# Patient Record
Sex: MALE
[De-identification: names, ages, dates, MRNs, and addresses within clinical notes are randomized per-mention and may not be internally consistent; named-entity substitution may affect disease eponyms.]

---

## 2020-03-05 ENCOUNTER — SURGERY (OUTPATIENT)
Age: 50
End: 2020-03-05
Payer: COMMERCIAL

## 2020-03-05 ENCOUNTER — APPOINTMENT (OUTPATIENT)
Dept: GENERAL RADIOLOGY | Facility: CLINIC | Age: 50
End: 2020-03-05
Attending: EMERGENCY MEDICINE
Payer: COMMERCIAL

## 2020-03-05 ENCOUNTER — HOSPITAL ENCOUNTER (INPATIENT)
Facility: CLINIC | Age: 50
LOS: 3 days | Discharge: HOME | End: 2020-03-08
Attending: EMERGENCY MEDICINE | Admitting: INTERNAL MEDICINE
Payer: COMMERCIAL

## 2020-03-05 DIAGNOSIS — R57.0 CARDIOGENIC SHOCK (H): ICD-10-CM

## 2020-03-05 DIAGNOSIS — I21.19 ACUTE ST ELEVATION MYOCARDIAL INFARCTION (STEMI) OF INFERIOR WALL (H): ICD-10-CM

## 2020-03-05 DIAGNOSIS — I44.2 COMPLETE HEART BLOCK (H): ICD-10-CM

## 2020-03-05 DIAGNOSIS — I21.3 ST ELEVATION MYOCARDIAL INFARCTION (STEMI), UNSPECIFIED ARTERY (H): ICD-10-CM

## 2020-03-05 DIAGNOSIS — I25.10 CORONARY ARTERY DISEASE INVOLVING NATIVE CORONARY ARTERY OF NATIVE HEART WITHOUT ANGINA PECTORIS: Primary | ICD-10-CM

## 2020-03-05 DIAGNOSIS — I21.3 ST ELEVATION MI (STEMI) (H): ICD-10-CM

## 2020-03-05 LAB
ANION GAP SERPL CALCULATED.3IONS-SCNC: 9 MMOL/L (ref 3–14)
APTT PPP: 28 SEC (ref 22–37)
APTT PPP: >240 SEC (ref 22–37)
BUN SERPL-MCNC: 20 MG/DL (ref 7–30)
CALCIUM SERPL-MCNC: 8.2 MG/DL (ref 8.5–10.1)
CHLORIDE SERPL-SCNC: 112 MMOL/L (ref 94–109)
CHOLEST SERPL-MCNC: 190 MG/DL
CO2 SERPL-SCNC: 20 MMOL/L (ref 20–32)
CREAT SERPL-MCNC: 1.07 MG/DL (ref 0.66–1.25)
GFR SERPL CREATININE-BSD FRML MDRD: 82 ML/MIN/{1.73_M2}
GLUCOSE BLDC GLUCOMTR-MCNC: 104 MG/DL (ref 70–99)
GLUCOSE BLDC GLUCOMTR-MCNC: 113 MG/DL (ref 70–99)
GLUCOSE SERPL-MCNC: 152 MG/DL (ref 70–99)
HBA1C MFR BLD: 5.7 % (ref 0–5.6)
HDLC SERPL-MCNC: 35 MG/DL
INR PPP: 1.08 (ref 0.86–1.14)
INTERPRETATION ECG - MUSE: NORMAL
KCT BLD-ACNC: 235 SEC (ref 75–150)
LDLC SERPL CALC-MCNC: 146 MG/DL
NONHDLC SERPL-MCNC: 155 MG/DL
POTASSIUM SERPL-SCNC: 3.5 MMOL/L (ref 3.4–5.3)
SODIUM SERPL-SCNC: 141 MMOL/L (ref 133–144)
TRIGL SERPL-MCNC: 43 MG/DL
TROPONIN I SERPL-MCNC: 10.83 UG/L (ref 0–0.04)
TROPONIN I SERPL-MCNC: <0.015 UG/L (ref 0–0.04)

## 2020-03-05 PROCEDURE — 25000132 ZZH RX MED GY IP 250 OP 250 PS 637: Performed by: INTERNAL MEDICINE

## 2020-03-05 PROCEDURE — C1730 CATH, EP, 19 OR FEW ELECT: HCPCS | Performed by: INTERNAL MEDICINE

## 2020-03-05 PROCEDURE — 80061 LIPID PANEL: CPT | Performed by: INTERNAL MEDICINE

## 2020-03-05 PROCEDURE — 84484 ASSAY OF TROPONIN QUANT: CPT | Performed by: INTERNAL MEDICINE

## 2020-03-05 PROCEDURE — 99152 MOD SED SAME PHYS/QHP 5/>YRS: CPT | Performed by: INTERNAL MEDICINE

## 2020-03-05 PROCEDURE — 92920 PRQ TRLUML C ANGIOP 1ART&/BR: CPT | Mod: 59 | Performed by: INTERNAL MEDICINE

## 2020-03-05 PROCEDURE — 93458 L HRT ARTERY/VENTRICLE ANGIO: CPT | Mod: 26 | Performed by: INTERNAL MEDICINE

## 2020-03-05 PROCEDURE — 25000128 H RX IP 250 OP 636: Performed by: INTERNAL MEDICINE

## 2020-03-05 PROCEDURE — 25800030 ZZH RX IP 258 OP 636: Performed by: INTERNAL MEDICINE

## 2020-03-05 PROCEDURE — 20000003 ZZH R&B ICU

## 2020-03-05 PROCEDURE — 80048 BASIC METABOLIC PNL TOTAL CA: CPT | Performed by: EMERGENCY MEDICINE

## 2020-03-05 PROCEDURE — 85025 COMPLETE CBC W/AUTO DIFF WBC: CPT | Performed by: EMERGENCY MEDICINE

## 2020-03-05 PROCEDURE — C1887 CATHETER, GUIDING: HCPCS | Performed by: INTERNAL MEDICINE

## 2020-03-05 PROCEDURE — B2111ZZ FLUOROSCOPY OF MULTIPLE CORONARY ARTERIES USING LOW OSMOLAR CONTRAST: ICD-10-PCS | Performed by: INTERNAL MEDICINE

## 2020-03-05 PROCEDURE — 27210794 ZZH OR GENERAL SUPPLY STERILE: Performed by: INTERNAL MEDICINE

## 2020-03-05 PROCEDURE — C1894 INTRO/SHEATH, NON-LASER: HCPCS | Performed by: INTERNAL MEDICINE

## 2020-03-05 PROCEDURE — 92941 PRQ TRLML REVSC TOT OCCL AMI: CPT | Mod: RC | Performed by: INTERNAL MEDICINE

## 2020-03-05 PROCEDURE — C1725 CATH, TRANSLUMIN NON-LASER: HCPCS | Performed by: INTERNAL MEDICINE

## 2020-03-05 PROCEDURE — 99223 1ST HOSP IP/OBS HIGH 75: CPT | Mod: AI | Performed by: INTERNAL MEDICINE

## 2020-03-05 PROCEDURE — C1769 GUIDE WIRE: HCPCS | Performed by: INTERNAL MEDICINE

## 2020-03-05 PROCEDURE — 85730 THROMBOPLASTIN TIME PARTIAL: CPT | Performed by: INTERNAL MEDICINE

## 2020-03-05 PROCEDURE — 85730 THROMBOPLASTIN TIME PARTIAL: CPT | Performed by: EMERGENCY MEDICINE

## 2020-03-05 PROCEDURE — 99291 CRITICAL CARE FIRST HOUR: CPT | Performed by: INTERNAL MEDICINE

## 2020-03-05 PROCEDURE — 83036 HEMOGLOBIN GLYCOSYLATED A1C: CPT | Performed by: INTERNAL MEDICINE

## 2020-03-05 PROCEDURE — 3E033XZ INTRODUCTION OF VASOPRESSOR INTO PERIPHERAL VEIN, PERCUTANEOUS APPROACH: ICD-10-PCS | Performed by: INTERNAL MEDICINE

## 2020-03-05 PROCEDURE — 92978 ENDOLUMINL IVUS OCT C 1ST: CPT | Performed by: INTERNAL MEDICINE

## 2020-03-05 PROCEDURE — 93454 CORONARY ARTERY ANGIO S&I: CPT | Mod: XE

## 2020-03-05 PROCEDURE — 99291 CRITICAL CARE FIRST HOUR: CPT | Mod: 25 | Performed by: INTERNAL MEDICINE

## 2020-03-05 PROCEDURE — 99285 EMERGENCY DEPT VISIT HI MDM: CPT

## 2020-03-05 PROCEDURE — C1753 CATH, INTRAVAS ULTRASOUND: HCPCS | Performed by: INTERNAL MEDICINE

## 2020-03-05 PROCEDURE — 027036Z DILATION OF CORONARY ARTERY, ONE ARTERY WITH THREE DRUG-ELUTING INTRALUMINAL DEVICES, PERCUTANEOUS APPROACH: ICD-10-PCS | Performed by: INTERNAL MEDICINE

## 2020-03-05 PROCEDURE — C9600 PERC DRUG-EL COR STENT SING: HCPCS | Performed by: INTERNAL MEDICINE

## 2020-03-05 PROCEDURE — 92921 ZZHC PRQ TRLUML CORONARY ANGIOPLASTY ADDL BRANCH: CPT | Mod: RC | Performed by: INTERNAL MEDICINE

## 2020-03-05 PROCEDURE — 85347 COAGULATION TIME ACTIVATED: CPT

## 2020-03-05 PROCEDURE — 4A023N7 MEASUREMENT OF CARDIAC SAMPLING AND PRESSURE, LEFT HEART, PERCUTANEOUS APPROACH: ICD-10-PCS | Performed by: INTERNAL MEDICINE

## 2020-03-05 PROCEDURE — 93458 L HRT ARTERY/VENTRICLE ANGIO: CPT | Performed by: INTERNAL MEDICINE

## 2020-03-05 PROCEDURE — 33210 INSERT ELECTRD/PM CATH SNGL: CPT | Performed by: INTERNAL MEDICINE

## 2020-03-05 PROCEDURE — 93010 ELECTROCARDIOGRAM REPORT: CPT | Performed by: INTERNAL MEDICINE

## 2020-03-05 PROCEDURE — 02703ZZ DILATION OF CORONARY ARTERY, ONE ARTERY, PERCUTANEOUS APPROACH: ICD-10-PCS | Performed by: INTERNAL MEDICINE

## 2020-03-05 PROCEDURE — 85610 PROTHROMBIN TIME: CPT | Performed by: EMERGENCY MEDICINE

## 2020-03-05 PROCEDURE — 93454 CORONARY ARTERY ANGIO S&I: CPT | Mod: 26 | Performed by: INTERNAL MEDICINE

## 2020-03-05 PROCEDURE — 93005 ELECTROCARDIOGRAM TRACING: CPT

## 2020-03-05 PROCEDURE — 00000146 ZZHCL STATISTIC GLUCOSE BY METER IP

## 2020-03-05 PROCEDURE — 71045 X-RAY EXAM CHEST 1 VIEW: CPT

## 2020-03-05 PROCEDURE — B240ZZ3 ULTRASONOGRAPHY OF SINGLE CORONARY ARTERY, INTRAVASCULAR: ICD-10-PCS | Performed by: INTERNAL MEDICINE

## 2020-03-05 PROCEDURE — 5A1223Z PERFORMANCE OF CARDIAC PACING, CONTINUOUS: ICD-10-PCS | Performed by: INTERNAL MEDICINE

## 2020-03-05 PROCEDURE — C9606 PERC D-E COR REVASC W AMI S: HCPCS | Performed by: INTERNAL MEDICINE

## 2020-03-05 PROCEDURE — C1874 STENT, COATED/COV W/DEL SYS: HCPCS | Performed by: INTERNAL MEDICINE

## 2020-03-05 PROCEDURE — 84484 ASSAY OF TROPONIN QUANT: CPT | Performed by: EMERGENCY MEDICINE

## 2020-03-05 PROCEDURE — 92978 ENDOLUMINL IVUS OCT C 1ST: CPT | Mod: 26 | Performed by: INTERNAL MEDICINE

## 2020-03-05 DEVICE — STENT SYNERGY DRUG ELUTING 2.50X38MM  H7493926038250: Type: IMPLANTABLE DEVICE | Status: FUNCTIONAL

## 2020-03-05 DEVICE — STENT SYNERGY DRUG ELUTING 3.00X12MM  H7493926012300: Type: IMPLANTABLE DEVICE | Status: FUNCTIONAL

## 2020-03-05 RX ORDER — SODIUM CHLORIDE 9 MG/ML
INJECTION, SOLUTION INTRAVENOUS CONTINUOUS
Status: ACTIVE | OUTPATIENT
Start: 2020-03-05 | End: 2020-03-05

## 2020-03-05 RX ORDER — FENTANYL CITRATE 50 UG/ML
INJECTION, SOLUTION INTRAMUSCULAR; INTRAVENOUS
Status: DISCONTINUED | OUTPATIENT
Start: 2020-03-05 | End: 2020-03-05 | Stop reason: HOSPADM

## 2020-03-05 RX ORDER — SODIUM CHLORIDE 9 MG/ML
INJECTION, SOLUTION INTRAVENOUS CONTINUOUS
Status: DISCONTINUED | OUTPATIENT
Start: 2020-03-05 | End: 2020-03-05

## 2020-03-05 RX ORDER — DOPAMINE HYDROCHLORIDE 160 MG/100ML
2-20 INJECTION, SOLUTION INTRAVENOUS CONTINUOUS PRN
Status: DISCONTINUED | OUTPATIENT
Start: 2020-03-05 | End: 2020-03-05 | Stop reason: HOSPADM

## 2020-03-05 RX ORDER — NICOTINE POLACRILEX 4 MG
15-30 LOZENGE BUCCAL
Status: DISCONTINUED | OUTPATIENT
Start: 2020-03-05 | End: 2020-03-08 | Stop reason: HOSPADM

## 2020-03-05 RX ORDER — ATROPINE SULFATE 0.1 MG/ML
0.5 INJECTION INTRAVENOUS EVERY 5 MIN PRN
Status: DISCONTINUED | OUTPATIENT
Start: 2020-03-05 | End: 2020-03-05

## 2020-03-05 RX ORDER — EPTIFIBATIDE 2 MG/ML
2 INJECTION, SOLUTION INTRAVENOUS CONTINUOUS
Status: DISCONTINUED | OUTPATIENT
Start: 2020-03-05 | End: 2020-03-06

## 2020-03-05 RX ORDER — POTASSIUM CHLORIDE 1500 MG/1
20 TABLET, EXTENDED RELEASE ORAL
Status: COMPLETED | OUTPATIENT
Start: 2020-03-05 | End: 2020-03-06

## 2020-03-05 RX ORDER — ASPIRIN 81 MG/1
TABLET, CHEWABLE ORAL
Status: DISCONTINUED | OUTPATIENT
Start: 2020-03-05 | End: 2020-03-05 | Stop reason: HOSPADM

## 2020-03-05 RX ORDER — NALOXONE HYDROCHLORIDE 0.4 MG/ML
.2-.4 INJECTION, SOLUTION INTRAMUSCULAR; INTRAVENOUS; SUBCUTANEOUS
Status: ACTIVE | OUTPATIENT
Start: 2020-03-05 | End: 2020-03-06

## 2020-03-05 RX ORDER — FENTANYL CITRATE 50 UG/ML
25-50 INJECTION, SOLUTION INTRAMUSCULAR; INTRAVENOUS
Status: ACTIVE | OUTPATIENT
Start: 2020-03-05 | End: 2020-03-06

## 2020-03-05 RX ORDER — DEXTROSE MONOHYDRATE 25 G/50ML
25-50 INJECTION, SOLUTION INTRAVENOUS
Status: DISCONTINUED | OUTPATIENT
Start: 2020-03-05 | End: 2020-03-08 | Stop reason: HOSPADM

## 2020-03-05 RX ORDER — ASPIRIN 81 MG/1
81 TABLET ORAL DAILY
Status: DISCONTINUED | OUTPATIENT
Start: 2020-03-05 | End: 2020-03-05

## 2020-03-05 RX ORDER — FENTANYL CITRATE 50 UG/ML
25-50 INJECTION, SOLUTION INTRAMUSCULAR; INTRAVENOUS
Status: DISCONTINUED | OUTPATIENT
Start: 2020-03-05 | End: 2020-03-05

## 2020-03-05 RX ORDER — FLUMAZENIL 0.1 MG/ML
0.2 INJECTION, SOLUTION INTRAVENOUS
Status: ACTIVE | OUTPATIENT
Start: 2020-03-05 | End: 2020-03-06

## 2020-03-05 RX ORDER — NICOTINE 21 MG/24HR
1 PATCH, TRANSDERMAL 24 HOURS TRANSDERMAL DAILY
Status: DISCONTINUED | OUTPATIENT
Start: 2020-03-05 | End: 2020-03-08 | Stop reason: HOSPADM

## 2020-03-05 RX ORDER — HEPARIN SODIUM 10000 [USP'U]/100ML
100-1000 INJECTION, SOLUTION INTRAVENOUS CONTINUOUS PRN
Status: DISCONTINUED | OUTPATIENT
Start: 2020-03-05 | End: 2020-03-05 | Stop reason: HOSPADM

## 2020-03-05 RX ORDER — ACETAMINOPHEN 325 MG/1
650 TABLET ORAL EVERY 4 HOURS PRN
Status: DISCONTINUED | OUTPATIENT
Start: 2020-03-05 | End: 2020-03-08 | Stop reason: HOSPADM

## 2020-03-05 RX ORDER — ASPIRIN 81 MG/1
81 TABLET ORAL DAILY
Status: DISCONTINUED | OUTPATIENT
Start: 2020-03-06 | End: 2020-03-08 | Stop reason: HOSPADM

## 2020-03-05 RX ORDER — PHENYLEPHRINE HCL IN 0.9% NACL 1 MG/10 ML
SYRINGE (ML) INTRAVENOUS
Status: DISCONTINUED | OUTPATIENT
Start: 2020-03-05 | End: 2020-03-05 | Stop reason: HOSPADM

## 2020-03-05 RX ORDER — ALUMINA, MAGNESIA, AND SIMETHICONE 2400; 2400; 240 MG/30ML; MG/30ML; MG/30ML
15 SUSPENSION ORAL
Status: COMPLETED | OUTPATIENT
Start: 2020-03-05 | End: 2020-03-05

## 2020-03-05 RX ORDER — ACETAMINOPHEN 325 MG/1
650 TABLET ORAL EVERY 4 HOURS PRN
Status: DISCONTINUED | OUTPATIENT
Start: 2020-03-05 | End: 2020-03-05

## 2020-03-05 RX ORDER — POTASSIUM CHLORIDE 7.45 MG/ML
10 INJECTION INTRAVENOUS
Status: DISCONTINUED | OUTPATIENT
Start: 2020-03-05 | End: 2020-03-08 | Stop reason: HOSPADM

## 2020-03-05 RX ORDER — DOBUTAMINE HYDROCHLORIDE 200 MG/100ML
2-20 INJECTION INTRAVENOUS CONTINUOUS PRN
Status: DISCONTINUED | OUTPATIENT
Start: 2020-03-05 | End: 2020-03-05 | Stop reason: HOSPADM

## 2020-03-05 RX ORDER — ATROPINE SULFATE 0.1 MG/ML
INJECTION INTRAVENOUS
Status: DISCONTINUED
Start: 2020-03-05 | End: 2020-03-05 | Stop reason: HOSPADM

## 2020-03-05 RX ORDER — LIDOCAINE 40 MG/G
CREAM TOPICAL
Status: DISCONTINUED | OUTPATIENT
Start: 2020-03-05 | End: 2020-03-08 | Stop reason: HOSPADM

## 2020-03-05 RX ORDER — ONDANSETRON 2 MG/ML
INJECTION INTRAMUSCULAR; INTRAVENOUS
Status: DISCONTINUED | OUTPATIENT
Start: 2020-03-05 | End: 2020-03-05 | Stop reason: HOSPADM

## 2020-03-05 RX ORDER — IOPAMIDOL 755 MG/ML
INJECTION, SOLUTION INTRAVASCULAR
Status: DISCONTINUED | OUTPATIENT
Start: 2020-03-05 | End: 2020-03-05 | Stop reason: HOSPADM

## 2020-03-05 RX ORDER — POTASSIUM CHLORIDE 1.5 G/1.58G
20-40 POWDER, FOR SOLUTION ORAL
Status: DISCONTINUED | OUTPATIENT
Start: 2020-03-05 | End: 2020-03-08 | Stop reason: HOSPADM

## 2020-03-05 RX ORDER — MAGNESIUM SULFATE HEPTAHYDRATE 40 MG/ML
4 INJECTION, SOLUTION INTRAVENOUS EVERY 4 HOURS PRN
Status: DISCONTINUED | OUTPATIENT
Start: 2020-03-05 | End: 2020-03-08 | Stop reason: HOSPADM

## 2020-03-05 RX ORDER — NALOXONE HYDROCHLORIDE 0.4 MG/ML
.1-.4 INJECTION, SOLUTION INTRAMUSCULAR; INTRAVENOUS; SUBCUTANEOUS
Status: DISCONTINUED | OUTPATIENT
Start: 2020-03-05 | End: 2020-03-05

## 2020-03-05 RX ORDER — HEPARIN SODIUM 5000 [USP'U]/.5ML
5000 INJECTION, SOLUTION INTRAVENOUS; SUBCUTANEOUS EVERY 8 HOURS
Status: DISCONTINUED | OUTPATIENT
Start: 2020-03-06 | End: 2020-03-08 | Stop reason: HOSPADM

## 2020-03-05 RX ORDER — FLUMAZENIL 0.1 MG/ML
0.2 INJECTION, SOLUTION INTRAVENOUS
Status: DISCONTINUED | OUTPATIENT
Start: 2020-03-05 | End: 2020-03-05

## 2020-03-05 RX ORDER — POTASSIUM CL/LIDO/0.9 % NACL 10MEQ/0.1L
10 INTRAVENOUS SOLUTION, PIGGYBACK (ML) INTRAVENOUS
Status: DISCONTINUED | OUTPATIENT
Start: 2020-03-05 | End: 2020-03-08 | Stop reason: HOSPADM

## 2020-03-05 RX ORDER — HEPARIN SODIUM 5000 [USP'U]/.5ML
5000 INJECTION, SOLUTION INTRAVENOUS; SUBCUTANEOUS EVERY 8 HOURS
Status: DISCONTINUED | OUTPATIENT
Start: 2020-03-05 | End: 2020-03-05

## 2020-03-05 RX ORDER — HEPARIN SODIUM 1000 [USP'U]/ML
INJECTION, SOLUTION INTRAVENOUS; SUBCUTANEOUS
Status: DISCONTINUED | OUTPATIENT
Start: 2020-03-05 | End: 2020-03-05 | Stop reason: HOSPADM

## 2020-03-05 RX ORDER — NITROGLYCERIN 20 MG/100ML
.07-2 INJECTION INTRAVENOUS CONTINUOUS PRN
Status: DISCONTINUED | OUTPATIENT
Start: 2020-03-05 | End: 2020-03-05 | Stop reason: HOSPADM

## 2020-03-05 RX ORDER — ARGATROBAN 1 MG/ML
150 INJECTION, SOLUTION INTRAVENOUS
Status: DISCONTINUED | OUTPATIENT
Start: 2020-03-05 | End: 2020-03-05 | Stop reason: HOSPADM

## 2020-03-05 RX ORDER — ATROPINE SULFATE 0.1 MG/ML
0.5 INJECTION INTRAVENOUS EVERY 5 MIN PRN
Status: DISCONTINUED | OUTPATIENT
Start: 2020-03-05 | End: 2020-03-06

## 2020-03-05 RX ORDER — NITROGLYCERIN 0.4 MG/1
0.4 TABLET SUBLINGUAL EVERY 5 MIN PRN
Status: DISCONTINUED | OUTPATIENT
Start: 2020-03-05 | End: 2020-03-05

## 2020-03-05 RX ORDER — POTASSIUM CHLORIDE 29.8 MG/ML
20 INJECTION INTRAVENOUS
Status: DISCONTINUED | OUTPATIENT
Start: 2020-03-05 | End: 2020-03-08 | Stop reason: HOSPADM

## 2020-03-05 RX ORDER — SODIUM CHLORIDE, SODIUM LACTATE, POTASSIUM CHLORIDE, CALCIUM CHLORIDE 600; 310; 30; 20 MG/100ML; MG/100ML; MG/100ML; MG/100ML
1000 INJECTION, SOLUTION INTRAVENOUS CONTINUOUS
Status: DISCONTINUED | OUTPATIENT
Start: 2020-03-05 | End: 2020-03-05

## 2020-03-05 RX ORDER — ARGATROBAN 1 MG/ML
350 INJECTION, SOLUTION INTRAVENOUS
Status: DISCONTINUED | OUTPATIENT
Start: 2020-03-05 | End: 2020-03-05 | Stop reason: HOSPADM

## 2020-03-05 RX ORDER — HYDROMORPHONE HYDROCHLORIDE 1 MG/ML
0.2 INJECTION, SOLUTION INTRAMUSCULAR; INTRAVENOUS; SUBCUTANEOUS
Status: DISCONTINUED | OUTPATIENT
Start: 2020-03-05 | End: 2020-03-08 | Stop reason: HOSPADM

## 2020-03-05 RX ORDER — NALOXONE HYDROCHLORIDE 0.4 MG/ML
.1-.4 INJECTION, SOLUTION INTRAMUSCULAR; INTRAVENOUS; SUBCUTANEOUS
Status: DISCONTINUED | OUTPATIENT
Start: 2020-03-05 | End: 2020-03-08 | Stop reason: HOSPADM

## 2020-03-05 RX ORDER — EPTIFIBATIDE 2 MG/ML
180 INJECTION, SOLUTION INTRAVENOUS ONCE
Status: COMPLETED | OUTPATIENT
Start: 2020-03-05 | End: 2020-03-05

## 2020-03-05 RX ORDER — ATORVASTATIN CALCIUM 10 MG/1
40 TABLET, FILM COATED ORAL DAILY
Status: DISCONTINUED | OUTPATIENT
Start: 2020-03-05 | End: 2020-03-05

## 2020-03-05 RX ORDER — POTASSIUM CHLORIDE 1500 MG/1
20-40 TABLET, EXTENDED RELEASE ORAL
Status: DISCONTINUED | OUTPATIENT
Start: 2020-03-05 | End: 2020-03-08 | Stop reason: HOSPADM

## 2020-03-05 RX ORDER — LORAZEPAM 2 MG/ML
0.5 INJECTION INTRAMUSCULAR
Status: DISCONTINUED | OUTPATIENT
Start: 2020-03-05 | End: 2020-03-08 | Stop reason: HOSPADM

## 2020-03-05 RX ORDER — NALOXONE HYDROCHLORIDE 0.4 MG/ML
.2-.4 INJECTION, SOLUTION INTRAMUSCULAR; INTRAVENOUS; SUBCUTANEOUS
Status: DISCONTINUED | OUTPATIENT
Start: 2020-03-05 | End: 2020-03-05

## 2020-03-05 RX ORDER — ATORVASTATIN CALCIUM 40 MG/1
40 TABLET, FILM COATED ORAL DAILY
Status: DISCONTINUED | OUTPATIENT
Start: 2020-03-05 | End: 2020-03-08 | Stop reason: HOSPADM

## 2020-03-05 RX ORDER — EPTIFIBATIDE 2 MG/ML
2 INJECTION, SOLUTION INTRAVENOUS CONTINUOUS
Status: DISCONTINUED | OUTPATIENT
Start: 2020-03-05 | End: 2020-03-05

## 2020-03-05 RX ORDER — MAGNESIUM SULFATE HEPTAHYDRATE 40 MG/ML
2 INJECTION, SOLUTION INTRAVENOUS DAILY PRN
Status: DISCONTINUED | OUTPATIENT
Start: 2020-03-05 | End: 2020-03-08 | Stop reason: HOSPADM

## 2020-03-05 RX ORDER — LORAZEPAM 0.5 MG/1
0.5 TABLET ORAL
Status: DISCONTINUED | OUTPATIENT
Start: 2020-03-05 | End: 2020-03-08 | Stop reason: HOSPADM

## 2020-03-05 RX ORDER — NITROGLYCERIN 0.4 MG/1
0.4 TABLET SUBLINGUAL EVERY 5 MIN PRN
Status: DISCONTINUED | OUTPATIENT
Start: 2020-03-05 | End: 2020-03-08 | Stop reason: HOSPADM

## 2020-03-05 RX ADMIN — TICAGRELOR 90 MG: 90 TABLET ORAL at 16:54

## 2020-03-05 RX ADMIN — NICOTINE 1 PATCH: 21 PATCH, EXTENDED RELEASE TRANSDERMAL at 20:34

## 2020-03-05 RX ADMIN — SODIUM CHLORIDE: 9 INJECTION, SOLUTION INTRAVENOUS at 18:08

## 2020-03-05 RX ADMIN — MIDAZOLAM HYDROCHLORIDE 2 MG: 1 INJECTION, SOLUTION INTRAMUSCULAR; INTRAVENOUS at 23:08

## 2020-03-05 RX ADMIN — DOPAMINE HYDROCHLORIDE 5 MCG/KG/MIN: 160 INJECTION, SOLUTION INTRAVENOUS at 14:57

## 2020-03-05 RX ADMIN — EPTIFIBATIDE 13860 MCG: 2 INJECTION, SOLUTION INTRAVENOUS at 16:17

## 2020-03-05 RX ADMIN — ALUMINUM HYDROXIDE, MAGNESIUM HYDROXIDE, AND DIMETHICONE 15 ML: 400; 400; 40 SUSPENSION ORAL at 23:08

## 2020-03-05 RX ADMIN — IOPAMIDOL 100 ML: 755 INJECTION, SOLUTION INTRAVENOUS at 16:53

## 2020-03-05 RX ADMIN — ONDANSETRON 4 MG: 2 INJECTION INTRAMUSCULAR; INTRAVENOUS at 16:22

## 2020-03-05 RX ADMIN — EPTIFIBATIDE 2 MCG/KG/MIN: 200 INJECTION INTRAVENOUS at 18:10

## 2020-03-05 RX ADMIN — DOPAMINE HYDROCHLORIDE 5 MCG/KG/MIN: 160 INJECTION, SOLUTION INTRAVENOUS at 14:44

## 2020-03-05 RX ADMIN — DOPAMINE HYDROCHLORIDE 10 MCG/KG/MIN: 160 INJECTION, SOLUTION INTRAVENOUS at 14:46

## 2020-03-05 RX ADMIN — HEPARIN SODIUM 6000 UNITS: 1000 INJECTION INTRAVENOUS; SUBCUTANEOUS at 16:14

## 2020-03-05 RX ADMIN — DOPAMINE HYDROCHLORIDE 2 MCG/KG/MIN: 160 INJECTION, SOLUTION INTRAVENOUS at 15:06

## 2020-03-05 RX ADMIN — EPTIFIBATIDE 2 MCG/KG/MIN: 2 INJECTION, SOLUTION INTRAVENOUS at 16:23

## 2020-03-05 RX ADMIN — ATORVASTATIN CALCIUM 40 MG: 40 TABLET, FILM COATED ORAL at 20:33

## 2020-03-05 ASSESSMENT — ACTIVITIES OF DAILY LIVING (ADL): ADLS_ACUITY_SCORE: 17

## 2020-03-05 NOTE — ED PROVIDER NOTES
History     Chief Complaint:  Chest Pain and Bradycardia      The history is provided by the patient and the EMS personnel. The history is limited by a language barrier and the condition of the patient.   History limited by acuity.  Candelario Stahl is a 49 year old male, Ghanaian speaking, who presents with chest pain and bradycardia via EMS. Per report, the patient was unloading items from [employer] today when he started to experience chest pain with shortness of breath. EMS were called and Fire lowered himonto the ground and when he was connected to the monitor it was showing signs of a STEMI with a 3rd degree block. He was given 0.5 atropine and 324 mg of Aspirin en route. The patient was noted to be bradycardic and hypotensive but had a normal blood glucose. The patient was noted to be visibility in distress but did not have any loss of consciousness.  He appeared pale to EMS.  The patient denies any history of cardiac disease.  He smokes tobacco.        Allergies:  No known drug allergies.       Medications:    No current medications.     Past Medical History:    Medical history reviewed. No pertinent medical history.      Past Surgical History:    Past surgical history reviewed. No pertinent past surgical history.     Family History:    Family history reviewed. No pertinent family history.     Social History:  The patient was accompanied to the ED by EMS.  The patient speaks Ghanaian.  Marital Status:      Review of Systems   Unable to perform ROS: Acuity of condition     Physical Exam     Patient Vitals for the past 24 hrs:   BP Pulse Heart Rate Resp SpO2 Weight   03/05/20 1436 -- -- -- -- -- 77 kg (169 lb 12.1 oz)   03/05/20 1428 -- -- (!) 34 29 -- --   03/05/20 1425 (!) 82/49 (!) 34 (!) 34 (!) 36 100 % --   03/05/20 1424 -- -- (!) 34 24 100 % --   03/05/20 1419 (!) 89/58 (!) 37 (!) 38 (!) 43 -- --      Physical Exam  General: Ill-appearing male recumbent in room 8  HENT: mucous membranes  moist  CV: bradycardic native rate, patient with pacer pads on chest being paced at a rate of 60, regular rhythm, no lower extremity edema, no JVD, palpable symmetric radial pulses, no murmur audible  Resp: clear throughout, no crackles or wheezing  GI: abdomen soft and nontender, no guarding, negative Davila's sign  MSK: no bony tenderness to chest  Skin: appropriately warm and dry, no erythema or vesicles to chest wall  Neuro: alert, clear speech, oriented   Psych: cooperates as able    Emergency Department Course     ECG:  ECG taken at 1420, ECG read at 1430  Complete heart block  Suspect inferior STEMI  Rate 37 bpm. NH interval * ms. QRS duration 98 ms. QT/QTc 450/353 ms. P-R-T axes 73 72 128.    Imaging:  Radiology findings were communicated with the patient who voiced understanding of the findings.    XR Chest Port 1 View  No acute disease.  Reading per radiology       Interventions:  Transcutaneous pacing  IV heparin bolus  Brilinta PO      Laboratory:  Laboratory findings were unable to be communicated with the patient due to condition of the patient.    CBC: WBC 15.6 (H), HGB 15.1,   BMP: Chloride 112 (H), Glucose 152 (H), Calcium 8.2 (L), o/w WNL (Creatinine 1.07)    Troponin (Collected 1420): <0.015  INR: 1.08  Partial Thromboplastin Time: 28     Emergency Department Course:  STEMI was activated from the field by EMS.  I spoke with the charge nurse, LONNIE, and several RNs and ERTs in advance of the patient arrival to prepare.  It was a particularly chaotic time the emergency department due to a large patient cesus with high acuity, and a stabilization room was not available.    1413 The patient arrived in the emergency department via EMS where I met and evaluated the patient immediately.     1414 Nursing brought crash cart in the room. Plan to transfer to stabilization room if one becomes available. Dr. Cheng arrived in the patient's room.     I performed electronic chart review in EPIC.  The  patient was placed on continuous cardiac and pulse ox monitoring.    1420  EKG obtained as noted above. IV was inserted and blood was drawn for laboratory testing, results above.     1423  I walked to CV suite 1 and spoke directly with the intervention cardiologist, Dr. Mata, regarding the patient's plan of care.     1427 Portable chest xray arrived in the patient's room.    1429 I personally viewed the portable xray images after it was obtained.     1430 The patient left for cath lab under the care of Dr. Mata.     Impression & Plan      Medical Decision Making:  He has signs and symptoms consistent with an acute ST elevation myocardial infarction causing complete heart block.  He was in cardiogenic shock with bradycardia and hypotension.  IV fluid was administered.  He had received aspirin from EMS.  IV access was secured.  He is not hypoxic so would not require intubation.  Due to the overall departmental status at the time of his arrival, I walked directly to the catheterization suite and spoke with 1 of the on-duty interventional cardiologist, who agreed that the patient warranted emergent cardiac catheterization and placement of a transvenous pacer.  Transcutaneous pacing has been continued in the meantime.  Alternate etiologies including pulmonary embolism, aortic dissection, and pericardial effusion were considered and not definitively ruled out but thought to be much less likely.  He was taken directly to the cardiac catheterization lab in critical condition.    Diagnosis:    ICD-10-CM    1. ST elevation MI (STEMI) (H) I21.3 Cardiac Catheterization     Cardiac Catheterization     CBC with platelets differential     INR     Partial thromboplastin time     Basic metabolic panel     Troponin I   2. Complete heart block (H) I44.2    3. ST elevation myocardial infarction (STEMI), unspecified artery (H) I21.3    4. Cardiogenic shock (H) R57.0      CMS Diagnoses: The patient has a STEMI     The patient was  evaluated at 1413   Patient was transferred  to the cath lab which was activated due to ECG changes.   ASA given by medics or taken today  Critical Care Time: Over 33 minutes, independent of procedures.  This included time spent obtaining a history and physical, reviewing the patient's chart, interpreting lab and imaging studies, re-examining the patient, coordinating care with other providers, and documenting ED care provided.  He has an acute myocardial infarction and required aggressive resuscitation and emergent transfer to the cardiac catheterization lab to optimize his outcome.  His condition carries very high morbidity and mortality.  Disposition:   Transfer to the Cath Lab.     Scribe Disclosure:  I, Orla Severson, am serving as a scribe at 2:20 PM on 3/5/2020 to document services personally performed by Altaf Delgado MD based on my observations and the provider's statements to me.    EMERGENCY DEPARTMENT    This record was created at least in part using electronic voice recognition software, so please excuse any typographical errors.        Altaf Delgado MD  03/05/20 2015

## 2020-03-05 NOTE — PROGRESS NOTES
[facility]    Cardiology Consultation     Date of Admission:  3/5/2020      Addendum:    Patient upon arrival to floor apparently had chest pain and heart block again.  He appeared to be cyanotic and complained of chest pain.  He was taken emergently back to Cath Lab and temporary pacemaker was placed.  Following that coronary angiography demonstrated closure of the previously placed stents in the mid segment.  This was quickly wired and high-pressure balloon inflation was performed.  IVUS was then performed to assess for full expansion of the stented segments.  Following that Integrilin was given.  Patient is currently chest pain-free on no pressor support.  He does have smaller arteries and therefore we are holding off on any mechanical support.  Patient appears stable with systolic blood pressures in the low 100s heart rate is now in the 80s to 90s in sinus.  We will leave the temporary venous pacemaker overnight as a precaution.  Findings were conveyed to the ICU team    Residual disease in the LAD can be treated as an outpatient with PCI        Fabricio Mata MD        Assessment & Plan     1.  Inferior STEMI with third-degree heart block  2.  Cardiogenic shock -improving with revascularization and medical stabilization  3.  Successful PCI of culprit RCA with KORTNEY x3 from ostial to distal RCA  4.  Residual disease in LAD, borderline circumflex OM lesions  5.  Moderately elevated left ventricular end-diastolic pressure 19 mmHg  6.  Hypertension    Recommendations    1.  Cardiogenic shock with inferior wall MI and third-degree heart block: Patient is perfusing with sinus rhythm.  Chest pain has since resolved.  EKG appears to have near normalized.  Temporary venous pacemaker has been removed he is currently on low-dose dopamine which we will continue short-term.  His LVEDP is lower than 1 would expect possibly has evidence of RV infarct based on presentation.  However has improved with IV  fluid administration which I would continue over the next 24 hours.    2.  Would continue with his aspirin and Brilinta and have also started a statin.    3.  Given his hemodynamics we have elected not to place mechanical support as his blood pressure is over 100s millimeters of mercury with low-dose dopamine at 2.5 mics per kilo per minute.  He appears to be perfusing as well as a LVEDP of only 19 mmHg.    4.  We have also not started beta-blocker and ACE inhibitor, this perhaps can be initiated tomorrow as hemodynamics will allow as well as weaning of the dopamine.    5.  Further recommendations per inpatient team appreciate assistance from hospitalist colleagues taken care of this gentleman.    6.  Patient should return to Cath Lab for PCI of LAD and functional testing of the circumflex OM system possibly as an outpatient.  Would assess his clinical status during his current admission.    Greater than 30 minutes critical care time coordinate his care beyond the case.      Fabircio Mata MD      History of present illness    Patient is a 49-year-old male, Slovenian-speaking who presents to the emergency room via EMS after onset of chest pain today.  He was working in [employer] where he started experiencing chest pain with shortness of breath.  EMS were called and they found him to be in third-degree heart block with evidence of an inferior STEMI.  He was given atropine and aspirin with minimal improvement in heart rate.  He had a period of time where he was transcutaneously paced.  He had nausea and vomiting accompanying his symptoms.  He has not had any symptoms prior to this.  Patient does take medications for hypertension otherwise does not have any chronic medical issues per review of history.  He presented with cardiogenic shock and third-degree heart block on presentation.  He was emergently taken to Cath Lab and found to have two-vessel coronary artery disease with borderline lesions in the circumflex OM  system.  The culprit was the RCA.  This was repaired by placing 3 drug-eluting stents sequentially from the ostium to distal segment.  He also due to third-degree heart block had a temporary venous pacemaker placed however this was able to be weaned off at the end of case.  During the course of his cardiac cath he was in cardiogenic shock initially requiring pressor support with boluses of Maynor-Synephrine as well as dopamine initiation.  His dopamine has now since titrated downward.  He was chest pain-free upon transfer to floor.      Primary Care Physician   No primary care provider on file.      Past Medical History   I have reviewed this patient's medical history and updated it with pertinent information if needed.   No past medical history on file.    Past Surgical History   I have reviewed this patient's surgical history and updated it with pertinent information if needed.  No past surgical history on file.    Prior to Admission Medications   None     Current Facility-Administered Medications   Medication Dose Route Frequency     cangrelor  30 mcg/kg (Dosing Weight) Intravenous Once     heparin (porcine)  70 Units/kg (Dosing Weight) Intravenous Once     lactated ringers  1,000 mL Intravenous Once     ticagrelor  180 mg Oral Once     Current Facility-Administered Medications   Medication Last Rate     argatroban       cangrelor (KENGREAL) infusion ADULT       DOBUTamine       DOPamine       HEParin       lactated ringers       - MEDICATION INSTRUCTIONS -       nitroGLYcerin       nitroPRUsside (NIPRIDE) IV infusion ADULT/PEDS GREATER than or EQUAL to 45 kg std conc       Allergies   No Known Allergies    Social History        Family History   No family history on file.    Review of Systems   The comprehensive 10 point Review of Systems is negative other than noted in the HPI or here.     Physical Exam   Vital Signs with Ranges  Pulse:  [34-37] 34  Heart Rate:  [34-38] 34  Resp:  [24-43] 29  BP: (82-89)/(49-58)  82/49  SpO2:  [100 %] 100 %  Wt Readings from Last 4 Encounters:   03/05/20 77 kg (169 lb 12.1 oz)     No intake/output data recorded.      Vitals: BP (!) 82/49   Pulse (!) 34   Resp 29   Wt 77 kg (169 lb 12.1 oz)   SpO2 100%     Constitutional:   awake, alert, cooperative, no apparent distress, and appears stated age     Neck:   Supple, symmetrical, trachea midline, no adenopathy, thyroid symmetric, not enlarged and no tenderness, skin normal     Back:   Symmetric, no curvature, spinous processes are non-tender on palpation, paraspinous muscles are non-tender on palpation, no costal vertebral tenderness     Lungs:   No increased work of breathing, good air exchange, clear to auscultation bilaterally, no crackles or wheezing     Cardiovascular:   Normal apical impulse, regular rate and rhythm, normal S1 and S2, no S3 or S4, and no murmur noted     Abdomen:   No scars, normal bowel sounds, soft, non-distended, non-tender, no masses palpated, no hepatosplenomegally     Musculoskeletal:   There is no redness, warmth, or swelling of the joints.  Full range of motion noted.  Motor strength is 5 out of 5 all extremities bilaterally.  Tone is normal.       Recent Labs   Lab 03/05/20  1420   TROPI <0.015       Recent Labs   Lab 03/05/20  1420   WBC 15.6*   HGB 15.1   MCV 84      INR 1.08      POTASSIUM 3.5   CHLORIDE 112*   CO2 20   BUN 20   CR 1.07   GFRESTIMATED 82   GFRESTBLACK >90   ANIONGAP 9   RINA 8.2*   *   TROPI <0.015     No results for input(s): CHOL, HDL, LDL, TRIG, CHOLHDLRATIO in the last 15254 hours.  Recent Labs   Lab 03/05/20  1420   WBC 15.6*   HGB 15.1   HCT 45.2   MCV 84        No results for input(s): PH, PHV, PO2, PO2V, SAT, PCO2, PCO2V, HCO3, HCO3V in the last 168 hours.  No results for input(s): NTBNPI, NTBNP in the last 168 hours.  No results for input(s): DD in the last 168 hours.  No results for input(s): SED, CRP in the last 168 hours.  Recent Labs   Lab  03/05/20  1420        No results for input(s): TSH in the last 168 hours.  No results for input(s): COLOR, APPEARANCE, URINEGLC, URINEBILI, URINEKETONE, SG, UBLD, URINEPH, PROTEIN, UROBILINOGEN, NITRITE, LEUKEST, RBCU, WBCU in the last 168 hours.    Imaging:  Recent Results (from the past 48 hour(s))   XR Chest Port 1 View    Narrative    CHEST ONE VIEW  3/5/2020 2:33 PM     HISTORY: Acute chest pain/shortness of breath.    COMPARISON: None.      Impression    IMPRESSION: No acute disease.    ADELINA CERVANTES MD       Echo:  No results found for this or any previous visit (from the past 4320 hour(s)).

## 2020-03-05 NOTE — H&P
Critical Care  Note      03/05/2020    Name: Candelario Stahl MRN#: [id number]   Age: 49 year old YOB: 1970     [facility]                 Problem List:   Principal Problem:    Complete heart block (H)  CAD and STEMI  Acute hypoxemia         Summary/Hospital Course:     49M developed acute chest pain today and found to have STEMI and complete heart block on arrival to the ED.  Underwent cath this afternoon with placement of three stents to the RCA.  Sent to ICU but had to return to cath lab for persistent bradycardia (see my separate note) where an intravenous pacer was placed and he was found to have an obstruction of the middle stent in the RCA which was re-expanded.  On my repeat visit he is feeling better.  He now denies chest pain, dyspnea, nausea/vomiting, diaphroesis and dizzyness/lightheadedness.     ROS:  Negative on 10-point ROS except as noted above.      Assessment and plan :     Candelario Stahl IS a 49 year old male admitted on 3/5/2020 for cardiogenic shock, symptomatic bradycardia and STEMI.   I have personally reviewed the daily labs, imaging studies, cultures and discussed the case with referring physician and consulting physicians.     My assessment and plan by system for this patient is as follows:    Neurology/Psychiatry:   1. Analgesia:  Prn tylenol, prn dilaudid    Cardiovascular:   1.  Cardiogenic shock:  Wean dopamine as tolerated.  Seems markedly improved with reversal of bradycardia.    2.  STEMI:  Appreciate cardiology support.  Continue ASA and Integrilin.  Will eventually need bblocker/ace when out of cardiogenic shock.  Will eventually need statin.    Pulmonary/Ventilator Management:   1. Acute hypoxemia: Resolved with reversal of bradycardia.  Now on room air.    GI and Nutrition :   1. Advance to heart healthy diet as tolerated    Renal/Fluids/Electrolytes:   1. Probable KALEY:  Mild.  Creat 1.07.  Unclear baseline.  In setting of cardiogenic shock  noted above.  Monitor UOP and creatinine.    Infectious Disease:  No acute issues at this time    Endocrine:   1. Hyperglycemia to 152: prn insulin.  Check A1C.     Hematology/Oncology:   1. Wbc 15.6 likely reactive  2.  hgb 15 acceptable  3. pltlts 312 ok     IV/Access:   1. Venous access - sheath  2. Arterial access - sheath    ICU Prophylaxis:   1. DVT: Hep Subq/ mechanical  2. Feeding - ADAT  3. Family Update: pt himself at bedside  4. Disposition - ICU        Medical History:     No past medical history on file.  No past surgical history on file.  Social History     Socioeconomic History     Marital status:      Spouse name: Not on file     Number of children: Not on file     Years of education: Not on file     Highest education level: Not on file   Occupational History     Not on file   Social Needs     Financial resource strain: Not on file     Food insecurity:     Worry: Not on file     Inability: Not on file     Transportation needs:     Medical: Not on file     Non-medical: Not on file   Tobacco Use     Smoking status: Not on file   Substance and Sexual Activity     Alcohol use: Not on file     Drug use: Not on file     Sexual activity: Not on file   Lifestyle     Physical activity:     Days per week: Not on file     Minutes per session: Not on file     Stress: Not on file   Relationships     Social connections:     Talks on phone: Not on file     Gets together: Not on file     Attends [employer] service: Not on file     Active member of club or organization: Not on file     Attends meetings of clubs or organizations: Not on file     Relationship status: Not on file     Intimate partner violence:     Fear of current or ex partner: Not on file     Emotionally abused: Not on file     Physically abused: Not on file     Forced sexual activity: Not on file   Other Topics Concern     Not on file   Social History Narrative     Not on file      No Known Allergies           Key Medications:       lactated  ringers  1,000 mL Intravenous Once     ticagrelor  180 mg Oral Once       eptifibatide 2 mcg/kg/min (03/05/20 1623)     lactated ringers       - MEDICATION INSTRUCTIONS -          Home Meds  No current facility-administered medications on file prior to encounter.   No current outpatient medications on file prior to encounter.    Family history:  Reviewed.  Non-contributory.           Physical Examination:   Pulse:  [34-37] 34  Heart Rate:  [34-38] 34  Resp:  [24-43] 29  BP: (82-89)/(49-58) 82/49  SpO2:  [100 %] 100 %  No intake or output data in the 24 hours ending 03/05/20 1642  Wt Readings from Last 4 Encounters:   03/05/20 77 kg (169 lb 12.1 oz)     BP - Mean:  [61-69] 61  Resp: 29    No lab results found in last 7 days.    GEN: no acute distress   HEENT: head ncat, sclera anicteric, OP patent, trachea midline   PULM: unlabored, clear anteriorly    CV/COR: RRR S1S2 no gallop,  No rub, no murmur  ABD: soft nontender, hypoactive bowel sounds, no mass  EXT:  No Edema; warm x4  NEURO: awake/alert/interactive, moves all 4 with good strength  SKIN: no obvious rash  LINES: clean, dry intact         Data:   All data and imaging reviewed     ROUTINE ICU LABS (Last four results)  CMP  Recent Labs   Lab 03/05/20  1420      POTASSIUM 3.5   CHLORIDE 112*   CO2 20   ANIONGAP 9   *   BUN 20   CR 1.07   GFRESTIMATED 82   GFRESTBLACK >90   RINA 8.2*     CBC  Recent Labs   Lab 03/05/20  1420   WBC 15.6*   RBC 5.38   HGB 15.1   HCT 45.2   MCV 84   MCH 28.1   MCHC 33.4   RDW 13.0        INR  Recent Labs   Lab 03/05/20  1420   INR 1.08     Arterial Blood GasNo lab results found in last 7 days.    All cultures:  No results for input(s): CULT in the last 168 hours.  Recent Results (from the past 24 hour(s))   XR Chest Port 1 View    Narrative    CHEST ONE VIEW  3/5/2020 2:33 PM     HISTORY: Acute chest pain/shortness of breath.    COMPARISON: None.      Impression    IMPRESSION: No acute disease.    ADELINA CERVANTES MD      Labs (personally reviewed/interpreted):  See a/p.  EKG (personally reviewed/interpreted):  avr elevation; reciprocal depressions  CXR (personally reviewed/interpreted):  Lungs clear    Discussed again with Dr. Mata of interventional cardiology    Billing: This patient is critically ill: Yes. Total critical care time today 35 min, in addition to 30 min spent earlier in the day.  Total now 65 min.

## 2020-03-05 NOTE — Clinical Note
The first balloon was inserted into the right coronary artery.Max pressure = 16 alex. Total duration = 11 seconds.     Max pressure = 20 alex. Total duration = 12 seconds.    Balloon reinflated a second time: Max pressure = 20 alex. Total duration = 12 seconds.  Balloon reinflated a third time: Max pressure = 20 alex. Total duration = 9 seconds.  Balloon reinflated a fourth time: Max pressure = 22 alex. Total duration = 14 seconds.  Balloon reinflated a fourth time: Max pressure = 22 alex. Total duration = 14 seconds.

## 2020-03-05 NOTE — Clinical Note
Stent deployed in the middle right coronary artery. Max pressure = 11 alex. Total duration = 10 seconds.

## 2020-03-05 NOTE — ED NOTES
Bed: ED08  Expected date:   Expected time:   Means of arrival:   [facility]

## 2020-03-05 NOTE — H&P
[facility]    History and Physical - Hospitalist Service       Date of Admission:  3/5/2020    Assessment & Plan   Candelario Stahl is a 49 year old man from [place] with personal history of HTN. tobacco dependence and family history of CAD who was admitted on 3/5/2020 with S acute ST elevation MI.     Acute STEMI   CAD - with culprit lesion RCA.  S/p KORTNEY x3 from ostial to distal RCA.  Distal residual of the disease in LAD, borderline circumflex OM lesions. H/o exertional chest pain with negative stress echo in 2018.   Cardiogenic shock, resolving with elevated LVEDP pressure of 19 mmHg.  He was on low-dose dopamine following procedure and this has been weaned off with stable blood pressures, clear lungs and good oxygenation.   Transient third-degree heart block secondary to inferior MI, resolved -venous pacemaker removed in Cath Lab.   -Continue Brilinta, aspirin, statin  -Integrilin gtt x18 hours  -Hold on beta-blocker, ACE inhibitor at this time  -Patient also takes antihypertensive medication and will have this brought from home.  -Check lipids and A1c.   -Cardiology following as well regarding plan for LAD.   -Cardiac rehab consulted.     Leukocytosis (15), stress reaction. H/o congestion without fever or body aches last week which are resolving. Suspect minor URI.  I do not suspect flu, and not likely contributing to WBC.   - Follow up ordered.     Tobacco dependence - Patient smokes over a pack per day.  -We discussed the absolute necessity of complete tobacco cessation.  His family was present at all and are in agreement  -Discussed nicotine replacement therapy. Patient was in agreement with this.  Will use a nicotine patch and gum as needed.  -Discussed considering chantix/broprion in future if continues with cravings. Will hold on this right now as we will be starting multiple new medications.     Occasional small-volume bright red blood per rectum, suspect hemorrhoids -  Patient  states that he small he passes a small amount of blood when with bowel movements when they are hard to pass.  Denies any current symptoms or irritation.  Hemoglobin at admission is 15.1 with a normal MCV.   -We discussed monitoring this and the increased risk of bleeding on aspirin and Brilinta.   -Further evaluation as outpatient.      Diet: NPO for Medical/Clinical Reasons Except for: Meds    DVT Prophylaxis: On DAPT and integrelin currently. Ambulate.   Perry Catheter: not present  Code Status: Full     Disposition Plan   Expected discharge: 2 - 3 days, recommended to prior living arrangement   Entered: Radha Sawyer MD 03/05/2020, 3:49 PM     The patient's care was discussed with the Bedside Nurse, Patient and Patient's Family.    Radha Sawyer MD  [facility]    ______________________________________________________________________    Chief Complaint   Chest pain, S/p drug-eluting stent x3 to RCA for ST elevation MI    History is obtained from the patient    History of Present Illness      Candelario Stahl is a 49 year old man from [place] with personal history of HTN. tobacco dependence and family history of CAD who was admitted on 3/5/2020 with S acute ST elevation MI.      He was at [employer] today bringing his groceries to the car when he started to experience chest pain with shortness of breath in the car.  He stated he had severe pressure in his chest that felt like burning with associated shortness of breath and lightheadedness.  He denies passing out.  Denies any palpitations.  His wife called 911.  EMS were called and when he was connected to the monitor it was showing signs of a STEMI with a 3rd degree block. He was given 0.5 atropine and 324 mg of Aspirin en route. The patient was noted to be bradycardic and hypertensive but had a normal blood glucse. The patient was noted to be visibility in distress but did not have any loss of consciousness.       Placed on  transcutaneous pacemaker en ED with HR in the 30s, SBP in the 70s. In the cath lab, he had evidence of cardiogenic shock. A transvenous pacmaker was placed, he was given IVF and dopamine. He was found to have 2 vessel disease.  With RCA being the culprit lesion.  He underwent KORTNEY x3 from ostial to distal RCA.  There is still residual disease in the LAD with borderline circumflex OM lesions.  Also noted was moderately elevated left ventricular end-diastolic pressure at 19 mmHg.  His rhythm was restored to sinus rhythm after reperfusion and transvenous pacemaker was removed.  He has been weaned off dopamine with stable blood pressure.     Review of Systems    The 10 point Review of Systems is negative other than noted in the HPI and here.  He notes a small amount of blood in his stool when his bowel movements are hard to pass.    Past Medical History    Hypertension    Past Surgical History    No surgical history.     Social History   He lives with his wife and family.  His daughter wife and brother are present.  He smokes over a pack per day.  Has for many years.  He is employed.    Family History   Coronary artery disease.  His father had a heart attack in his 60s Brother had a heart attack in his 50s and required open heart surgery surgery.     Prior to Admission Medications   He is on a blood pressure medication he is unaware of the name.    Allergies   No Known Allergies    Physical Exam   Vital Signs:     BP: 99/72 Pulse: 87 Heart Rate: 83 Resp: 22 SpO2: 97 % O2 Device: Partial rebreather mask Oxygen Delivery: 15 LPM  Weight: 169 lbs 12.07 oz    Constitutional:   awake, alert, cooperative, no apparent distress, and appears stated age     Eyes:   Lids and lashes normal, pupils equal, round and reactive to light, extra ocular muscles intact, sclera clear, conjunctiva normal     ENT:   Normocephalic, without obvious abnormality, atramatic, oral pharynx with moist mucus membranes, tonsils without erythema or  exudates .     Neck:   Supple, symmetrical, trachea midline, no adenopathy, thyroid symmetric, not enlarged and no tenderness, skin normal     Lungs:   No increased work of breathing, good air exchange, clear to auscultation bilaterally, no crackles or wheezing     Cardiovascular:   Regular rate and rhythm, normal S1 and S2, no S3 or S4, and no murmur noted. Extremities are warm. No edema.      Abdomen:   Normal bowel sounds, soft, non-distended, non-tender, no masses palpated, no hepatosplenomegally     Musculoskeletal:   There is no redness, warmth, or swelling of the joints. Normal build.      Neurologic:   Awake, alert, oriented to name, place and time.  Cranial nerves II-XII are grossly intact.  Moving a 4 extremities without gross focal weakness.     Neuropsychiatric:   General: normal, calm and normal eye contact     Skin:   no bruising or bleeding, no redness, warmth, or swelling and no rashes         Data   Data reviewed today: I reviewed all medications, new labs and imaging results over the last 24 hours. I personally reviewed the EKG tracing showing showed 3rd degree heart block, ST elevation in aVR and V1 with ST depression in anterior lateral leads.  and the chest x-ray image(s) showing clear lungs. Follow up EKG shows NSR with resolution of ST changes.     Recent Labs   Lab 03/05/20  1420   WBC 15.6*   HGB 15.1   MCV 84      INR 1.08      POTASSIUM 3.5   CHLORIDE 112*   CO2 20   BUN 20   CR 1.07   ANIONGAP 9   RINA 8.2*   *   TROPI <0.015     Recent Results (from the past 24 hour(s))   XR Chest Port 1 View    Narrative    CHEST ONE VIEW  3/5/2020 2:33 PM     HISTORY: Acute chest pain/shortness of breath.    COMPARISON: None.      Impression    IMPRESSION: No acute disease.    ADELINA CERVANTES MD   Cardiac Catheterization    Narrative    1.  Two-vessel coronary artery disease with successful PCI of proximal to   distal RCA with drug-eluting stents x3  2.  Residual disease noted in LAD  with borderline circumflex disease  3.  LVEDP is 19 mmHg   Cardiac Catheterization    Narrative    1.  Stent thrombosis of mid RCA stents  2.  High-pressure balloon angioplasty with restoration of LETTY-3 flow  3.  IVUS performed demonstrated full apposition of stented segments

## 2020-03-05 NOTE — PROGRESS NOTES
Intensivist:    Called emergently to the patient's room as he arrived from the cath lab in complete heart block with a pulse of ~30 and active chest pain.  Apparently he had presented earlier in the day with a STEMI and underwent stenting in the cath lab.  Per the staff bringing the patient from the cath lab he initially presented in 3rd degree heart block and an intraventricular pacer had been placed, but this was subsequently removed when he coverted to sinus rhythm.  He complained of active L sided chest pain and shortness of breath; apparently he had been chest pain free in the cath lab.  No diaphoresis, nausea, vomiting or lightheadedness.      On exam he was satting 100% on non-rebreather, breathing non-labored.  Palpable distal pulses.  bp high 80s over low 60s on cuff.  Extremities warm and well perfused.    EKG done during the interval he was here showed complete heart block at 34, nl axis, otherwise nl intervals, ST elevations in avR with reciprocal depressions in other leads.    On my arrival to the room we pushed 1 mg of atropine and increased his dopamine drip to 5.  There was minimal response to this so a second mg of atropine was given.  At this point I was able to reach Dr. Mata of interventional cardiology.  We discussed the case and he  agreed that the patient should return to the cath lab for intravenous pacer placement and assessment of grafts.    I spent 30 minutes at the bedside providing critical care to this patient for the diagnoses of:  1. Cardiogenic shock requiring IV dopamine  2.  Symptomatic bradycardia  3.  Acute hypoxemia

## 2020-03-05 NOTE — Clinical Note
The first balloon was inserted into the right coronary artery.Max pressure = 22 alex. Total duration = 7 seconds.

## 2020-03-05 NOTE — LETTER
[facility]  [address]  [place] [address]  [phone]          March 8, 2020    RE:  Candelario Stahl                                                                                                                                                       [address] [facility] [address]            To whom it may concern:    Candelario Stahl is under my professional care for    ST elevation MI (STEMI) (H)  Complete heart block (H)  ST elevation myocardial infarction (STEMI), unspecified artery (H)  Cardiogenic shock (H)  Coronary artery disease involving native coronary artery of native heart without angina pectoris  Complete heart block (H)  Acute ST elevation myocardial infarction (STEMI) of inferior wall (H)        He  may return to work on Monday March 16th, 2020 and will have no activity restrictions at that time.         Sincerely,      Dr. Abundio Smith

## 2020-03-05 NOTE — Clinical Note
The first balloon was inserted into the right coronary artery.Max pressure = 18 alex. Total duration = 14 seconds.     Max pressure = 20 alex. Total duration = 15 seconds.    Balloon reinflated a second time: Max pressure = 20 alex. Total duration = 15 seconds.

## 2020-03-05 NOTE — Clinical Note
Max pressure = 14 alex. Total duration = 14 seconds.     Max pressure = 14 alex. Total duration = 6 seconds.    Balloon reinflated a second time: Max pressure = 14 alex. Total duration = 6 seconds.  Balloon reinflated a third time: Max pressure = 14 alex. Total duration = 6 seconds.  Balloon reinflated a fourth time: Max pressure = 14 alex. Total duration = 7 seconds.  Balloon reinflated a fourth time: Max pressure = 16 alex. Total duration = 8 seconds.

## 2020-03-05 NOTE — Clinical Note
The first balloon was inserted into the right coronary artery.Max pressure = 14 alex. Total duration = 10 seconds.      Balloon reinflated a second time:

## 2020-03-05 NOTE — Clinical Note
Stent deployed in the proximal right coronary artery. Max pressure = 12 alex. Total duration = 11 seconds.

## 2020-03-05 NOTE — PROGRESS NOTES
Pt arrived to room 345 from Cath lab  At 1545.  HR rate varying from 30s -40s on Zoll. Pt appearing in acute distress, lips cyanotic, C/O Lt arm pain.  ICU team called into room. 2 mg Atropine given with no improvement. Cath Lab RNs stayed at bedside. Decision made to  Immediately transport back to cath lab for Pacing. Nursing to follow closely.

## 2020-03-05 NOTE — Clinical Note
The first balloon was inserted into the right coronary artery and middle right coronary artery.Max pressure = 12 alex. Total duration = 6 seconds.     Max pressure = 14 alex. Total duration = 6 seconds.    Balloon reinflated a second time: Max pressure = 14 alex. Total duration = 6 seconds.

## 2020-03-05 NOTE — Clinical Note
The first balloon was inserted into the right coronary artery.Max pressure = 12 alex. Total duration = 11 seconds.     Max pressure = 18 alex. Total duration = 12 seconds.    Balloon reinflated a second time: Max pressure = 18 alex. Total duration = 12 seconds.  Balloon reinflated a third time: Max pressure = 18 alex. Total duration = 7 seconds.  Balloon reinflated a fourth time: Max pressure = 18 alex. Total duration = 8 seconds.  Balloon reinflated a fourth time: Max pressure = 20 alex. Total duration = 10 seconds.

## 2020-03-05 NOTE — Clinical Note
Stent deployed in the ostium right coronary artery. Max pressure = 12 alex. Total duration = 20 seconds. Balloon reinflated a second time: Max pressure = 16 alex. Total duration = 18 seconds.

## 2020-03-05 NOTE — Clinical Note
The first balloon was inserted into the right coronary artery and middle right coronary artery.Max pressure = 10 alex. Total duration = 5 seconds.     Max pressure = 10 alex. Total duration = 5 seconds.    Balloon reinflated a second time: Max pressure = 10 alex. Total duration = 5 seconds.

## 2020-03-05 NOTE — Clinical Note
The first balloon was inserted into the right coronary artery.Max pressure = 18 alex. Total duration = 15 seconds.

## 2020-03-05 NOTE — ED TRIAGE NOTES
Pt presents via EMS. Pt had sudden onset of chest pain, shortness of breath. feeling dizzy, weak and gray. 911 called. Upon arrival pt was moaning, complaining of chest pain. Assisted ground. 12 lead showed third degree heart block and inferior elevation. 0.5mg of atropine given with no change in rhythm. ASA 324mg given. BP systolic 60. Pacing initiated at rate of 70 . Pt arrives to % paced. Alert and orientated. Complains chest pain 5/10. Pacing stopped. Rhythm third degree heart block. BP 89/58. Skin cool, clammy.

## 2020-03-05 NOTE — Clinical Note
The first balloon was inserted into the right coronary artery and middle right coronary artery.Max pressure = 10 alex. Total duration = 5 seconds.     Max pressure = 12 alex. Total duration = 8 seconds.    Balloon reinflated a second time: Max pressure = 12 alex. Total duration = 8 seconds.

## 2020-03-05 NOTE — Clinical Note
Temporary pacemaker Rate= 70bpmPaced mA= 5Pacer wire was captured appropriately, Pacer is set, Demand on Standby and Pacing catheter was left in place

## 2020-03-05 NOTE — Clinical Note
The first balloon was inserted into the right coronary artery.Max pressure = 16 alex. Total duration = 11 seconds.     Max pressure = 18 alex. Total duration = 11 seconds.    Balloon reinflated a second time: Max pressure = 18 alex. Total duration = 11 seconds.  Balloon reinflated a third time: Max pressure = 20 alex. Total duration = 12 seconds.

## 2020-03-05 NOTE — PRE-PROCEDURE
GENERAL PRE-PROCEDURE:   Procedure:  Coronary angiogram  Date/Time:  3/5/2020 2:27 PM    Verbal consent obtained?: Yes    Written consent obtained?: Yes    Consent given by:  Patient  Expected level of sedation:  Moderate  ASA Class:  Class 3- Severe systemic disease, definite functional limitations  Mallampati  :  Grade 3- soft palate visible, posterior pharyngeal wall not visible

## 2020-03-05 NOTE — Clinical Note
The first balloon was inserted into the right coronary artery.Max pressure = 20 alex. Total duration = 11 seconds.     Max pressure = 24 alex. Total duration = 12 seconds.    Balloon reinflated a second time: Max pressure = 24 alex. Total duration = 12 seconds.  Balloon reinflated a third time: Max pressure = 22 alex. Total duration = 10 seconds.

## 2020-03-06 ENCOUNTER — APPOINTMENT (OUTPATIENT)
Dept: CARDIOLOGY | Facility: CLINIC | Age: 50
End: 2020-03-06
Attending: INTERNAL MEDICINE
Payer: COMMERCIAL

## 2020-03-06 LAB
ANION GAP SERPL CALCULATED.3IONS-SCNC: 4 MMOL/L (ref 3–14)
APTT PPP: 32 SEC (ref 22–37)
BASOPHILS # BLD AUTO: 0 10E9/L (ref 0–0.2)
BASOPHILS NFR BLD AUTO: 0.1 %
BUN SERPL-MCNC: 21 MG/DL (ref 7–30)
BURR CELLS BLD QL SMEAR: SLIGHT
CALCIUM SERPL-MCNC: 8 MG/DL (ref 8.5–10.1)
CHLORIDE SERPL-SCNC: 112 MMOL/L (ref 94–109)
CO2 SERPL-SCNC: 23 MMOL/L (ref 20–32)
CREAT SERPL-MCNC: 0.81 MG/DL (ref 0.66–1.25)
DIFFERENTIAL METHOD BLD: ABNORMAL
EOSINOPHIL # BLD AUTO: 0.5 10E9/L (ref 0–0.7)
EOSINOPHIL NFR BLD AUTO: 3.2 %
ERYTHROCYTE [DISTWIDTH] IN BLOOD BY AUTOMATED COUNT: 13 % (ref 10–15)
ERYTHROCYTE [DISTWIDTH] IN BLOOD BY AUTOMATED COUNT: 13.4 % (ref 10–15)
GFR SERPL CREATININE-BSD FRML MDRD: >90 ML/MIN/{1.73_M2}
GLUCOSE BLDC GLUCOMTR-MCNC: 104 MG/DL (ref 70–99)
GLUCOSE BLDC GLUCOMTR-MCNC: 104 MG/DL (ref 70–99)
GLUCOSE BLDC GLUCOMTR-MCNC: 124 MG/DL (ref 70–99)
GLUCOSE BLDC GLUCOMTR-MCNC: 128 MG/DL (ref 70–99)
GLUCOSE SERPL-MCNC: 110 MG/DL (ref 70–99)
HBA1C MFR BLD: 5.7 % (ref 0–5.6)
HCT VFR BLD AUTO: 38.4 % (ref 40–53)
HCT VFR BLD AUTO: 38.9 % (ref 40–53)
HCT VFR BLD AUTO: 45.2 % (ref 40–53)
HGB BLD-MCNC: 12.7 G/DL (ref 13.3–17.7)
HGB BLD-MCNC: 12.8 G/DL (ref 13.3–17.7)
HGB BLD-MCNC: 15.1 G/DL (ref 13.3–17.7)
IMM GRANULOCYTES # BLD: 0.1 10E9/L (ref 0–0.4)
IMM GRANULOCYTES NFR BLD: 0.3 %
KCT BLD-ACNC: 396 SEC (ref 75–150)
LYMPHOCYTES # BLD AUTO: 6.9 10E9/L (ref 0.8–5.3)
LYMPHOCYTES NFR BLD AUTO: 44.4 %
MAGNESIUM SERPL-MCNC: 2.2 MG/DL (ref 1.6–2.3)
MCH RBC QN AUTO: 27.6 PG (ref 26.5–33)
MCH RBC QN AUTO: 28.1 PG (ref 26.5–33)
MCHC RBC AUTO-ENTMCNC: 33.1 G/DL (ref 31.5–36.5)
MCHC RBC AUTO-ENTMCNC: 33.4 G/DL (ref 31.5–36.5)
MCV RBC AUTO: 84 FL (ref 78–100)
MCV RBC AUTO: 84 FL (ref 78–100)
MONOCYTES # BLD AUTO: 1.1 10E9/L (ref 0–1.3)
MONOCYTES NFR BLD AUTO: 6.8 %
NEUTROPHILS # BLD AUTO: 7.1 10E9/L (ref 1.6–8.3)
NEUTROPHILS NFR BLD AUTO: 45.2 %
NRBC # BLD AUTO: 0 10*3/UL
NRBC BLD AUTO-RTO: 0 /100
PHOSPHATE SERPL-MCNC: 3.4 MG/DL (ref 2.5–4.5)
PLATELET # BLD AUTO: 252 10E9/L (ref 150–450)
PLATELET # BLD AUTO: 312 10E9/L (ref 150–450)
PLATELET # BLD EST: ABNORMAL 10*3/UL
POTASSIUM SERPL-SCNC: 3.8 MMOL/L (ref 3.4–5.3)
RBC # BLD AUTO: 4.6 10E12/L (ref 4.4–5.9)
RBC # BLD AUTO: 5.38 10E12/L (ref 4.4–5.9)
SODIUM SERPL-SCNC: 139 MMOL/L (ref 133–144)
TROPONIN I SERPL-MCNC: 24.78 UG/L (ref 0–0.04)
TROPONIN I SERPL-MCNC: 37.12 UG/L (ref 0–0.04)
WBC # BLD AUTO: 15.6 10E9/L (ref 4–11)
WBC # BLD AUTO: 8.3 10E9/L (ref 4–11)

## 2020-03-06 PROCEDURE — 85014 HEMATOCRIT: CPT | Performed by: INTERNAL MEDICINE

## 2020-03-06 PROCEDURE — 00000146 ZZHCL STATISTIC GLUCOSE BY METER IP

## 2020-03-06 PROCEDURE — 25000132 ZZH RX MED GY IP 250 OP 250 PS 637: Performed by: INTERNAL MEDICINE

## 2020-03-06 PROCEDURE — 99291 CRITICAL CARE FIRST HOUR: CPT | Mod: 25 | Performed by: INTERNAL MEDICINE

## 2020-03-06 PROCEDURE — 83735 ASSAY OF MAGNESIUM: CPT | Performed by: INTERNAL MEDICINE

## 2020-03-06 PROCEDURE — 25000128 H RX IP 250 OP 636: Performed by: INTERNAL MEDICINE

## 2020-03-06 PROCEDURE — 83036 HEMOGLOBIN GLYCOSYLATED A1C: CPT | Performed by: INTERNAL MEDICINE

## 2020-03-06 PROCEDURE — 84484 ASSAY OF TROPONIN QUANT: CPT | Performed by: INTERNAL MEDICINE

## 2020-03-06 PROCEDURE — 85018 HEMOGLOBIN: CPT | Performed by: INTERNAL MEDICINE

## 2020-03-06 PROCEDURE — 99233 SBSQ HOSP IP/OBS HIGH 50: CPT | Performed by: INTERNAL MEDICINE

## 2020-03-06 PROCEDURE — 85027 COMPLETE CBC AUTOMATED: CPT | Performed by: INTERNAL MEDICINE

## 2020-03-06 PROCEDURE — 93005 ELECTROCARDIOGRAM TRACING: CPT

## 2020-03-06 PROCEDURE — 99233 SBSQ HOSP IP/OBS HIGH 50: CPT | Performed by: HOSPITALIST

## 2020-03-06 PROCEDURE — 80048 BASIC METABOLIC PNL TOTAL CA: CPT | Performed by: INTERNAL MEDICINE

## 2020-03-06 PROCEDURE — 85730 THROMBOPLASTIN TIME PARTIAL: CPT | Performed by: INTERNAL MEDICINE

## 2020-03-06 PROCEDURE — 25000128 H RX IP 250 OP 636: Performed by: HOSPITALIST

## 2020-03-06 PROCEDURE — 93306 TTE W/DOPPLER COMPLETE: CPT | Mod: 26 | Performed by: INTERNAL MEDICINE

## 2020-03-06 PROCEDURE — 93306 TTE W/DOPPLER COMPLETE: CPT

## 2020-03-06 PROCEDURE — 93010 ELECTROCARDIOGRAM REPORT: CPT | Performed by: INTERNAL MEDICINE

## 2020-03-06 PROCEDURE — 25000132 ZZH RX MED GY IP 250 OP 250 PS 637: Performed by: HOSPITALIST

## 2020-03-06 PROCEDURE — 21000001 ZZH R&B HEART CARE

## 2020-03-06 PROCEDURE — 84100 ASSAY OF PHOSPHORUS: CPT | Performed by: INTERNAL MEDICINE

## 2020-03-06 RX ORDER — FLUMAZENIL 0.1 MG/ML
0.2 INJECTION, SOLUTION INTRAVENOUS
Status: ACTIVE | OUTPATIENT
Start: 2020-03-06 | End: 2020-03-07

## 2020-03-06 RX ORDER — MULTIVITAMIN,THERAPEUTIC
1 TABLET ORAL
COMMUNITY

## 2020-03-06 RX ORDER — METOPROLOL TARTRATE 25 MG/1
25 TABLET, FILM COATED ORAL 2 TIMES DAILY
Status: DISCONTINUED | OUTPATIENT
Start: 2020-03-06 | End: 2020-03-08 | Stop reason: HOSPADM

## 2020-03-06 RX ORDER — ATROPINE SULFATE 0.1 MG/ML
0.5 INJECTION INTRAVENOUS EVERY 5 MIN PRN
Status: DISPENSED | OUTPATIENT
Start: 2020-03-06 | End: 2020-03-07

## 2020-03-06 RX ORDER — CETIRIZINE HYDROCHLORIDE 10 MG/1
10 TABLET ORAL DAILY
COMMUNITY
End: 2020-11-16

## 2020-03-06 RX ORDER — LISINOPRIL 30 MG/1
30 TABLET ORAL DAILY
Status: ON HOLD | COMMUNITY
End: 2020-03-08

## 2020-03-06 RX ORDER — NALOXONE HYDROCHLORIDE 0.4 MG/ML
.2-.4 INJECTION, SOLUTION INTRAMUSCULAR; INTRAVENOUS; SUBCUTANEOUS
Status: DISCONTINUED | OUTPATIENT
Start: 2020-03-06 | End: 2020-03-06

## 2020-03-06 RX ORDER — LISINOPRIL 10 MG/1
10 TABLET ORAL DAILY
Status: DISCONTINUED | OUTPATIENT
Start: 2020-03-06 | End: 2020-03-08 | Stop reason: HOSPADM

## 2020-03-06 RX ORDER — FENTANYL CITRATE 50 UG/ML
25-50 INJECTION, SOLUTION INTRAMUSCULAR; INTRAVENOUS
Status: ACTIVE | OUTPATIENT
Start: 2020-03-06 | End: 2020-03-07

## 2020-03-06 RX ADMIN — HYDROMORPHONE HYDROCHLORIDE 0.2 MG: 1 INJECTION, SOLUTION INTRAMUSCULAR; INTRAVENOUS; SUBCUTANEOUS at 12:58

## 2020-03-06 RX ADMIN — HEPARIN SODIUM 5000 UNITS: 5000 INJECTION, SOLUTION INTRAVENOUS; SUBCUTANEOUS at 06:20

## 2020-03-06 RX ADMIN — ATORVASTATIN CALCIUM 40 MG: 40 TABLET, FILM COATED ORAL at 10:12

## 2020-03-06 RX ADMIN — METOPROLOL TARTRATE 25 MG: 25 TABLET ORAL at 21:18

## 2020-03-06 RX ADMIN — NICOTINE 1 PATCH: 21 PATCH, EXTENDED RELEASE TRANSDERMAL at 10:12

## 2020-03-06 RX ADMIN — HEPARIN SODIUM 5000 UNITS: 5000 INJECTION, SOLUTION INTRAVENOUS; SUBCUTANEOUS at 14:44

## 2020-03-06 RX ADMIN — POTASSIUM CHLORIDE 20 MEQ: 1500 TABLET, EXTENDED RELEASE ORAL at 21:35

## 2020-03-06 RX ADMIN — ASPIRIN 81 MG: 81 TABLET, DELAYED RELEASE ORAL at 10:12

## 2020-03-06 RX ADMIN — TICAGRELOR 90 MG: 90 TABLET ORAL at 06:20

## 2020-03-06 RX ADMIN — TICAGRELOR 90 MG: 90 TABLET ORAL at 17:35

## 2020-03-06 RX ADMIN — HYDROMORPHONE HYDROCHLORIDE 0.2 MG: 1 INJECTION, SOLUTION INTRAMUSCULAR; INTRAVENOUS; SUBCUTANEOUS at 10:26

## 2020-03-06 RX ADMIN — LISINOPRIL 10 MG: 10 TABLET ORAL at 21:18

## 2020-03-06 RX ADMIN — HEPARIN SODIUM 5000 UNITS: 5000 INJECTION, SOLUTION INTRAVENOUS; SUBCUTANEOUS at 21:19

## 2020-03-06 ASSESSMENT — ACTIVITIES OF DAILY LIVING (ADL)
BATHING: 0-->INDEPENDENT
RETIRED_EATING: 0-->INDEPENDENT
TOILETING: 0-->INDEPENDENT
ADLS_ACUITY_SCORE: 19
ADLS_ACUITY_SCORE: 19
FALL_HISTORY_WITHIN_LAST_SIX_MONTHS: NO
TRANSFERRING: 0-->INDEPENDENT
ADLS_ACUITY_SCORE: 19
COGNITION: 0 - NO COGNITION ISSUES REPORTED
RETIRED_COMMUNICATION: 0-->UNDERSTANDS/COMMUNICATES WITHOUT DIFFICULTY
SWALLOWING: 0-->SWALLOWS FOODS/LIQUIDS WITHOUT DIFFICULTY
DRESS: 0-->INDEPENDENT
ADLS_ACUITY_SCORE: 19
AMBULATION: 0-->INDEPENDENT
ADLS_ACUITY_SCORE: 19

## 2020-03-06 NOTE — PROVIDER NOTIFICATION
Provider notified of patient c/o chest pressure. Ordered stat ekg and provider comparing results. Labs taken

## 2020-03-06 NOTE — CONSULTS
Medication coverage check for Brilinta. $24.99 monthly.    Mariola Rosa CphT  [facility] Discharge Pharmacy Liaison  Liaison Cell: [phone]

## 2020-03-06 NOTE — PROGRESS NOTES
Intensivist:    S:  Feeling well this morning.  He apparently had some chest pain and heart burn without ekg changes overnight but he says this is gone now.  No chest pain during my visit.  Denies shortness of breath, diaphoresis, weakness, lightheadness/dizzynes, and nausea/vomiting.     O:  /78   Pulse 68   Temp 98.8  F (37.1  C) (Axillary)   Resp 17   Wt 82.8 kg (182 lb 8.7 oz)   SpO2 97%   Room air  Gen:  No acute distress // HEENT:  PERRL, nose/ears grossly normal // Neck:  Supple // Lymph : no cervical adenopathy // chest : CTA-B, unlabored // cor:  rrr no m/r/g // abd s/nt/nd // extr:  wwp x4, no edema // neuro:  Awake, alert, rapid fluent appropriate speech, good str/sens x4 // skin:  No obvious rash    Labs (personally reviewed/interpreted):  Lytes ok, creat 0.81 improved, troponin 37 uptrending.  hgb 12.7 stable, pltlts 252 acceptable.    ekg (personally reviewed/interpreted):  Sinus 100, nl int except pr 220, nl axis, no acute ischemic changes    A/P:  1. STEMI:  Appreciate cardiology support.  S/p stenting. Will transition from Integrilin to brilinta today.  Continue ASA and atorva.  Troponin up to 37 this morning, continue trending.  Echo today.    2.  Symptomatic bradycardia:  Normal sinus in the 70s today.  Seems resolved.    3.  KALEY:  Resolved.  Creat down to 0.81 today.  Monitor.    Overall significantly improved from yesterday.  Intensivist service will sign off. Please reconsult as needed.

## 2020-03-06 NOTE — PLAN OF CARE
Arrived back to ICU at 1745 from cardiac Cath Lab, See previous notes. Tele NSR. B/P unremarkable. Afebrile. Sats high 90s on RA. Denies any current CP. Pulses palpable. CMS+.  Temporary Pacer in place. Lt and Rt sheaths intact. Lt arterial sheath with significant bleeding. Pressure applied, Thrombix disk, New dressings, Sites CDI at this time. Orders from Cards to pull Arterial Sheath, if needed,  when PTT below 45.  Trending Troponins, Critical  troponin Lab given to ICU providers, No new orders. Family updated at bedside on plan of care, Supportive. Nursing to follow closely.

## 2020-03-06 NOTE — CONSULTS
NUTRITION EDUCATION      REASON FOR NUTRITION CONSULT:  Provider Order  -  Nutrition Education - Dietitian to see for Heart Healthy Diet education       ASSESSMENT:  Unable to complete nutrition assessment and instructions at this time       FOLLOW UP:   Will instruct patient prior to discharge once transferred out of ICU     Gracie Freire RD, LD, CNSC   Clinical Dietitian - [facility]

## 2020-03-06 NOTE — PROGRESS NOTES
[facility]    Hospitalist Progress Note    Date of Admission:  3/5/2020    Assessment & Plan     Candelario Stahl is a 49 year old man from [place] with personal history of HTN, tobacco dependence and family history of CAD who was admitted on 3/5/2020 with acute inferior ST elevation MI with third-degree heart block.      Acute inferior STEMI    CAD   Cardiogenic shock, improving    Third-degree heart block  Acute hypoxemia secondary to above [resolved with treatment of above]  Taken emergently to Cath Lab.  Found to have culprit lesion in RCA.  S/p KORTNEY x3 from ostial to distal RCA.  Residual disease noted in distal LAD, borderline circumflex OM lesions.   He required pressor support with Maynor-Synephrine as well as dopamine infusion during and after procedure , that was later able to be weaned off.  Temporary venous pacemaker was placed initially and taken off by end of the procedure as he had converted to normal sinus rhythm.  Patient had recurrent chest pain and heart block after the procedure and he was taken back to Cath Lab emergently and temporary pacemaker was placed again.  Repeat coronary angiography demonstrated closure of previously placed stents in mid segment.  This was opened with balloon inflation.   Patient appeared hemodynamically stable, temporary pacemaker left in place and patient transferred back to ICU.  3/6  -Continue Brilinta, aspirin, statin  -Transitioned off Integrilin drip  -Cardiology to remove temporary pacemaker today  -Hold on beta-blocker, ACE inhibitor at this time  -Patient also takes antihypertensive medication and will have this brought from home.  -Lipids: HDL 35, , TG 43,   -Hemoglobin A1c 5.7  - Outpatient stress test for residual LAD and left circumflex disease  -Cardiac rehab consulted.   -Obtain TTE per cardiology     Leukocytosis (15), stress reaction. H/o congestion without fever or body aches last week which are resolving. Suspect  minor URI.  I do not suspect flu, and not likely contributing to WBC.   -Resolved following day     Tobacco dependence - Patient smokes over a pack per day.  -We discussed the absolute necessity of complete tobacco cessation.  His family was present at all and are in agreement  -Discussed nicotine replacement therapy. Patient was in agreement with this.  Will use a nicotine patch and gum as needed.    Occasional small-volume bright red blood per rectum, suspect hemorrhoids -  Patient states that he small he passes a small amount of blood when with bowel movements when they are hard to pass.  Denies any current symptoms or irritation.  Hemoglobin at admission is 15.1 with a normal MCV.   -We discussed monitoring this and the increased risk of bleeding on aspirin and Brilinta.   -Further evaluation as outpatient.      Diet:  Regular  DVT Prophylaxis: On DAPT and integrelin currently. Ambulate.   Perry Catheter: not present  Code Status: Full     Disposition: Transfer to [facility]      Eva Caceres MD  Text Page (7am - 6pm, M-F)    Interval History   Patient denies chest pain this morning.  No shortness of breath.  He was eating his breakfast and feeling much better.    -Data reviewed today: I reviewed all new labs and imaging results over the last 24 hours. I personally reviewed EKG with result as noted above    Physical Exam   Temp: 98.2  F (36.8  C) Temp src: Oral BP: 105/78 Pulse: 68 Heart Rate: 68 Resp: 17 SpO2: 97 % O2 Device: None (Room air) Oxygen Delivery: 15 LPM  Vitals:    03/05/20 1436 03/05/20 1800 03/06/20 0200   Weight: 77 kg (169 lb 12.1 oz) 82.6 kg (182 lb 1.6 oz) 82.8 kg (182 lb 8.7 oz)     Vital Signs with Ranges  Temp:  [98.2  F (36.8  C)-98.8  F (37.1  C)] 98.2  F (36.8  C)  Pulse:  [] 68  Heart Rate:  [] 68  Resp:  [10-43] 17  BP: ()/(49-87) 105/78  MAP:  [1 mmHg-98 mmHg] 90 mmHg  Arterial Line BP: (4-131)/(1-80) 129/68  SpO2:  [96 %-100 %] 97 %  I/O last 3 completed shifts:  In:  540 [P.O.:100; I.V.:440]  Out: 800 [Urine:800]    Constitutional: Alert, appears comfortable, in no acute distress  Respiratory: Non labored breathing, clear to auscultation bilaterally, no crackles or wheezes  Cardiovascular: Heart sounds regular rate and rhythm, no murmurs, no leg edema  GI: Abdomen is soft, non distended, non tender. Normal BS  Skin/Integumen: no rashes, no pressure sores  Neuro: alert, converses appropriately, moving all extremities, fluent speech, no facial asymmetry  Psych: mood and affect appropriate      Medications     eptifibatide 2 mcg/kg/min (03/06/20 0138)     - MEDICATION INSTRUCTIONS -       - MEDICATION INSTRUCTIONS -       Percutaneous Coronary Intervention orders placed (this is information for BPA alerting)       ACE/ARB/ARNI NOT PRESCRIBED       BETA BLOCKER NOT PRESCRIBED         aspirin  81 mg Oral Daily     atorvastatin  40 mg Oral Daily     heparin ANTICOAGULANT  5,000 Units Subcutaneous Q8H     insulin aspart  1-7 Units Subcutaneous TID AC     insulin aspart  1-5 Units Subcutaneous At Bedtime     nicotine  1 patch Transdermal Daily     nicotine   Transdermal Q8H     sodium chloride (PF)  3 mL Intracatheter Q8H     ticagrelor  90 mg Oral Q12H       Data   Recent Labs   Lab 03/06/20  0510 03/06/20  0400 03/05/20  1843 03/05/20  1420   WBC  --  8.3  --  15.6*   HGB 12.8* 12.7*  --  15.1   MCV  --  84  --  84   PLT  --  252  --  312   INR  --   --   --  1.08   NA  --  139  --  141   POTASSIUM  --  3.8  --  3.5   CHLORIDE  --  112*  --  112*   CO2  --  23  --  20   BUN  --  21  --  20   CR  --  0.81  --  1.07   ANIONGAP  --  4  --  9   RINA  --  8.0*  --  8.2*   GLC  --  110*  --  152*   TROPI  --  37.115* 10.827* <0.015       Imaging  Recent Results (from the past 24 hour(s))   XR Chest Port 1 View    Narrative    CHEST ONE VIEW  3/5/2020 2:33 PM     HISTORY: Acute chest pain/shortness of breath.    COMPARISON: None.      Impression    IMPRESSION: No acute disease.    ADELINA  MD HELEN   Cardiac Catheterization    Narrative    1.  Two-vessel coronary artery disease with successful PCI of proximal to   distal RCA with drug-eluting stents x3  2.  Residual disease noted in LAD with borderline circumflex disease  3.  LVEDP is 19 mmHg   Cardiac Catheterization    Narrative    1.  Stent thrombosis of mid RCA stents  2.  High-pressure balloon angioplasty with restoration of LETTY-3 flow  3.  IVUS performed demonstrated full apposition of stented segments

## 2020-03-06 NOTE — CONSULTS
[facility]    Cardiology Consultation     Date of Admission:  3/5/2020    Assessment & Plan     In summary this is a 49-year-old male with hypertension and early family history of coronary disease who presents with ST elevation myocardial infarction of the anterior wall complicated by cardiogenic shock as well as complete heart block needing a temporary dopamine infusion and transvenous pacing.  Urgently taken to cardiac catheterization lab and found to have RCA occlusion and he is status post 3 drug-eluting stents.  Residual disease in the LAD as well as left circumflex.  Post reperfusion EKG normalized as well as blood pressures then he had acute stent occlusion and recurrent complete heart block necessitating re-intervention and temp pacer placement. Received 18 hours eptifibitide infusion. Chest pain free presently with stable blood pressures.     Summary recommendations:    1.  Continue aspirin and ticagrelor for at least 1 year.    2.  Can pull arterial line and venous sheath / temp pacer  3.  Statin for hyperlipidemia  4.  Obtain echocardiogram  5.  Once hemodynamics completely stabilized can likely start low-dose beta-blocker and ACE inhibitor.  6.  Outpatient stress test for residual LAD and left circumflex disease.  7.  Transfer to CCU from ICU     Total critical care time 35 minutes.     Charlee Salazar MD    Code Status    Full Code    Reason for Consult     STEMI     Chief Complaint     Chest pain       History of Present Illness     This is a 49-year-old male with past medical history significant for hypertension, tobacco dependence and early family history of coronary disease admitted yesterday with ST elevation myocardial infarction.  Patient developed chest pain yesterday with associated shortness of breath while he was being pressures to the car.  He was with his wife who called 911 yesterday.  EMS was called and patient noted to have ST elevation on electrocardiogram as well as  third-degree heart block.  He was given atropine and loaded with aspirin.  Patient did not lose consciousness and was transported to [facility].  Vital signs in the ER notable for heart rate in the 30s and blood pressures in the 70s.  He was in cardiogenic shock.  Transvenous pacer was placed and he was given IV fluids and dopamine.  Culprit lesion was RCA.  He had 3 drug-eluting stents to RCA.  Residual disease is in the LAD and borderline disease of the circumflex. LVEDP of 19 mmHg.  After reperfusion his rhythm was normal sinus and transvenous pacing was removed.  He was weaned off of dopamine.  Blood pressures been stable. Course c/b post cath chest pain, occlusion of mid RCA stent needing reintervention and temp pacing.    Today he reports feeling well.  No chest pain or shortness of breath.  No lower extremity edema, PND orthopnea.  No pain at his sheath sites.    Past Medical History    HTN  Tobacco use       Past Surgical History   I have reviewed this patient's surgical history and updated it with pertinent information if needed.  Past Surgical History:   Procedure Laterality Date     CV CORONARY ANGIOGRAM N/A 3/5/2020    Procedure: Coronary Angiogram;  Surgeon: Fabricio Mata MD;  Location: [facility] CARDIAC CATH LAB     CV HEART CATHETERIZATION WITH POSSIBLE INTERVENTION N/A 3/5/2020    Procedure: Coronary Angiogram;  Surgeon: Fabricio Mata MD;  Location: [facility] CARDIAC CATH LAB     CV HEART CATHETERIZATION WITH POSSIBLE INTERVENTION N/A 3/5/2020    Procedure: Heart Catheterization with Possible Intervention;  Surgeon: Fabricio Mata MD;  Location: [facility] CARDIAC CATH LAB     CV PCI STENT DRUG ELUTING N/A 3/5/2020    Procedure: Percutaneous Coronary Intervention Stent Drug Eluting;  Surgeon: Fabricio Mata MD;  Location: [facility] CARDIAC CATH LAB     CV TEMPORARY PACEMAKER INSERTION N/A 3/5/2020    Procedure: Temporary Pacemaker Insertion;  Surgeon: Esperanza  Fabricio Vides MD;  Location:  HEART CARDIAC CATH LAB     CV TEMPORARY PACEMAKER INSERTION N/A 3/5/2020    Procedure: Temporary Pacemaker Insertion;  Surgeon: Fabricio Mata MD;  Location:  HEART CARDIAC CATH LAB       Prior to Admission Medications   Prior to Admission Medications   Prescriptions Last Dose Informant Patient Reported? Taking?   cetirizine (ZYRTEC) 10 MG tablet  Self Yes Yes   Sig: Take 10 mg by mouth daily   lisinopril (ZESTRIL) 30 MG tablet  Self Yes Yes   Sig: Take 30 mg by mouth daily   multivitamin, therapeutic (THERA-VIT) TABS tablet  Self Yes Yes   Sig: Take 1 tablet by mouth Takes when remembers, usually once weekly.      Facility-Administered Medications: None     Allergies   No Known Allergies    Social History   I have reviewed this patient's social history and updated it with pertinent information if needed. Candelario Gisella Favio      Family History   I have reviewed this patient's family history and updated it with pertinent information if needed.   No family history on file.    Review of Systems   The 10 point Review of Systems is negative other than noted in the HPI or here.     Physical Exam   Temp: 98.8  F (37.1  C) Temp src: Axillary BP: 105/78 Pulse: 68 Heart Rate: 68 Resp: 17 SpO2: 97 % O2 Device: None (Room air) Oxygen Delivery: 15 LPM  Vital Signs with Ranges  Temp:  [98.2  F (36.8  C)-98.8  F (37.1  C)] 98.8  F (37.1  C)  Pulse:  [] 68  Heart Rate:  [] 68  Resp:  [10-43] 17  BP: ()/(49-87) 105/78  MAP:  [1 mmHg-98 mmHg] 90 mmHg  Arterial Line BP: (4-131)/(1-80) 129/68  SpO2:  [96 %-100 %] 97 %  182 lbs 8.65 oz    Constitutional: Awake, alert, cooperative, no apparent distress.  Eyes: Conjunctiva and pupils examined and normal.  HEENT: Moist mucous membranes, normal dentition.  Respiratory: Clear to auscultation bilaterally, no crackles or wheezing.  Cardiovascular: Regular rate and rhythm, normal S1 and S2, and no murmur noted.  GI: Soft,  non-distended, non-tender, normal bowel sounds.  Lymph/Hematologic: No anterior cervical or supraclavicular adenopathy.  Skin: No rashes, no cyanosis, no edema.  Musculoskeletal: No joint swelling, erythema or tenderness.  Neurologic: Cranial nerves 2-12 intact, normal strength and sensation.  Psychiatric: Alert, oriented to person, place and time, no obvious anxiety or depression.     Data   Results for orders placed or performed during the hospital encounter of 03/05/20 (from the past 24 hour(s))   CBC with platelets differential   Result Value Ref Range    WBC 15.6 (H) 4.0 - 11.0 10e9/L    RBC Count 5.38 4.4 - 5.9 10e12/L    Hemoglobin 15.1 13.3 - 17.7 g/dL    Hematocrit 45.2 40.0 - 53.0 %    MCV 84 78 - 100 fl    MCH 28.1 26.5 - 33.0 pg    MCHC 33.4 31.5 - 36.5 g/dL    RDW 13.0 10.0 - 15.0 %    Platelet Count 312 150 - 450 10e9/L    Diff Method Automated Method     % Neutrophils 45.2 %    % Lymphocytes 44.4 %    % Monocytes 6.8 %    % Eosinophils 3.2 %    % Basophils 0.1 %    % Immature Granulocytes 0.3 %    Nucleated RBCs 0 0 /100    Absolute Neutrophil 7.1 1.6 - 8.3 10e9/L    Absolute Lymphocytes 6.9 (H) 0.8 - 5.3 10e9/L    Absolute Monocytes 1.1 0.0 - 1.3 10e9/L    Absolute Eosinophils 0.5 0.0 - 0.7 10e9/L    Absolute Basophils 0.0 0.0 - 0.2 10e9/L    Abs Immature Granulocytes 0.1 0 - 0.4 10e9/L    Absolute Nucleated RBC 0.0     Orient Cells Slight     Platelet Estimate       Automated count confirmed.  Platelet morphology is normal.   INR   Result Value Ref Range    INR 1.08 0.86 - 1.14   Partial thromboplastin time   Result Value Ref Range    PTT 28 22 - 37 sec   Basic metabolic panel   Result Value Ref Range    Sodium 141 133 - 144 mmol/L    Potassium 3.5 3.4 - 5.3 mmol/L    Chloride 112 (H) 94 - 109 mmol/L    Carbon Dioxide 20 20 - 32 mmol/L    Anion Gap 9 3 - 14 mmol/L    Glucose 152 (H) 70 - 99 mg/dL    Urea Nitrogen 20 7 - 30 mg/dL    Creatinine 1.07 0.66 - 1.25 mg/dL    GFR Estimate 82 >60  mL/min/[1.73_m2]    GFR Estimate If Black >90 >60 mL/min/[1.73_m2]    Calcium 8.2 (L) 8.5 - 10.1 mg/dL   Troponin I   Result Value Ref Range    Troponin I ES <0.015 0.000 - 0.045 ug/L   EKG 12-lead, tracing only   Result Value Ref Range    Interpretation ECG Click View Image link to view waveform and result    XR Chest Port 1 View    Narrative    CHEST ONE VIEW  3/5/2020 2:33 PM     HISTORY: Acute chest pain/shortness of breath.    COMPARISON: None.      Impression    IMPRESSION: No acute disease.    ADELINA CERVANTES MD   Activated clotting time POCT   Result Value Ref Range    Activated Clot Time 235 (H) 75 - 150 sec   Cardiac Catheterization    Narrative    1.  Two-vessel coronary artery disease with successful PCI of proximal to   distal RCA with drug-eluting stents x3  2.  Residual disease noted in LAD with borderline circumflex disease  3.  LVEDP is 19 mmHg   EKG 12-lead, tracing only   Result Value Ref Range    Interpretation ECG Click View Image link to view waveform and result    Cardiac Catheterization    Narrative    1.  Stent thrombosis of mid RCA stents  2.  High-pressure balloon angioplasty with restoration of LETTY-3 flow  3.  IVUS performed demonstrated full apposition of stented segments   Nutrition Services Adult IP Consult    Narrative    Gracie Freire RD, JOSHUA     3/6/2020  9:49 AM  NUTRITION EDUCATION      REASON FOR NUTRITION CONSULT:  Provider Order  -  Nutrition Education - Dietitian to see for   Heart Healthy Diet education       ASSESSMENT:  Unable to complete nutrition assessment and instructions at this   time       FOLLOW UP:   Will instruct patient prior to discharge once transferred out of   ICU     Gracie Freire RD, LD, [facility]   Clinical Dietitian - [facility]            EKG 12-lead, tracing only    Result Value Ref Range    Interpretation ECG Click View Image link to view waveform and result    Glucose by meter   Result Value Ref Range    Glucose 104 (H) 70 - 99 mg/dL    Lipid Profile   Result Value Ref Range    Cholesterol 190 <200 mg/dL    Triglycerides 43 <150 mg/dL    HDL Cholesterol 35 (L) >39 mg/dL    LDL Cholesterol Calculated 146 (H) <100 mg/dL    Non HDL Cholesterol 155 (H) <130 mg/dL   Troponin I   Result Value Ref Range    Troponin I ES 10.827 (HH) 0.000 - 0.045 ug/L   Hemoglobin A1c   Result Value Ref Range    Hemoglobin A1C 5.7 (H) 0 - 5.6 %   Partial thromboplastin time (PTT)   Result Value Ref Range    PTT >240 (HH) 22 - 37 sec   Glucose by meter   Result Value Ref Range    Glucose 113 (H) 70 - 99 mg/dL   EKG 12-lead, tracing only   Result Value Ref Range    Interpretation ECG Click View Image link to view waveform and result    Hemoglobin A1c   Result Value Ref Range    Hemoglobin A1C 5.7 (H) 0 - 5.6 %   Basic metabolic panel   Result Value Ref Range    Sodium 139 133 - 144 mmol/L    Potassium 3.8 3.4 - 5.3 mmol/L    Chloride 112 (H) 94 - 109 mmol/L    Carbon Dioxide 23 20 - 32 mmol/L    Anion Gap 4 3 - 14 mmol/L    Glucose 110 (H) 70 - 99 mg/dL    Urea Nitrogen 21 7 - 30 mg/dL    Creatinine 0.81 0.66 - 1.25 mg/dL    GFR Estimate >90 >60 mL/min/[1.73_m2]    GFR Estimate If Black >90 >60 mL/min/[1.73_m2]    Calcium 8.0 (L) 8.5 - 10.1 mg/dL   CBC with platelets   Result Value Ref Range    WBC 8.3 4.0 - 11.0 10e9/L    RBC Count 4.60 4.4 - 5.9 10e12/L    Hemoglobin 12.7 (L) 13.3 - 17.7 g/dL    Hematocrit 38.4 (L) 40.0 - 53.0 %    MCV 84 78 - 100 fl    MCH 27.6 26.5 - 33.0 pg    MCHC 33.1 31.5 - 36.5 g/dL    RDW 13.4 10.0 - 15.0 %    Platelet Count 252 150 - 450 10e9/L   Magnesium   Result Value Ref Range    Magnesium 2.2 1.6 - 2.3 mg/dL   Phosphorus   Result Value Ref Range    Phosphorus 3.4 2.5 - 4.5 mg/dL   Troponin I   Result Value Ref Range    Troponin I ES 37.115 (HH) 0.000 - 0.045 ug/L   Hemoglobin and hematocrit (Integrilin)   Result Value Ref Range    Hemoglobin 12.8 (L) 13.3 - 17.7 g/dL    Hematocrit 38.9 (L) 40.0 - 53.0 %   PTT (AM Draw)   Result Value Ref  Range    PTT 32 22 - 37 sec   Glucose by meter   Result Value Ref Range    Glucose 104 (H) 70 - 99 mg/dL

## 2020-03-06 NOTE — PHARMACY-ADMISSION MEDICATION HISTORY
Pharmacy Medication History  Admission medication history interview status for the 3/5/2020  admission is complete. See EPIC admission navigator for prior to admission medications     Medication history sources: Patient and Patient's family/friend (wife), had picture of medication on wife's phone  Medication history source reliability: Good  Adherence assessment: Good    Significant changes made to the medication list:  - Added all medications. Patient indicates that he takes multivitamin when he remembers - usually once weekly.    Additional medication history information:   none    Medication reconciliation completed by provider prior to medication history? N/A    Time spent in this activity: 10 minutes      Prior to Admission medications    Medication Sig Last Dose Taking? Auth Provider   cetirizine (ZYRTEC) 10 MG tablet Take 10 mg by mouth daily  Yes Unknown, Entered By History   lisinopril (ZESTRIL) 30 MG tablet Take 30 mg by mouth daily  Yes Unknown, Entered By History   multivitamin, therapeutic (THERA-VIT) TABS tablet Take 1 tablet by mouth Takes when remembers, usually once weekly.  Yes Unknown, Entered By History

## 2020-03-06 NOTE — PROGRESS NOTES
ICU crosscover note  3/6/2020    Patient noted to have pressure-like chest discomfort for past 2 hours. Said it was not painful. HR and BP stable.  EKG was obtained. Similar pattern compared to last EKG yesterday. No ST changes.   Hb noted to be 12.7, down from 15.6. On Integrilin.     Plan:  - Continue to monitor  - Trend CBC  - Follow troponin      Zach Palumbo, MS4  4:42 AM      Addendum:    Troponin elevated to 37. Likely consistent with expected peak.  Will recheck in 8 hours.    Plan:  - Recheck troponin at noon      Zach Palumbo, MS4  5:00 AM

## 2020-03-06 NOTE — PROGRESS NOTES
Afebrile. Sats high 90s on RA. Denies any current CP. Pulses palpable. CMS+.  Temporary Pacer in place. Lt and Rt sheaths intact. Sites CDI at this time. Orders from Cards to pull Arterial Sheath, if needed,  when PTT below 45.  Trending Troponins, Critical  troponin Lab given to ICU providers, No new orders.Critical PTT during noc MD aware. C/O heart burn at 2100, MD ordered maalox. PT C/O chest pressure, MD notified and stat EKG ordered Wife at bedside and notified of cares

## 2020-03-06 NOTE — PLAN OF CARE
Alert and oriented. POD 1 from Cardiac cath, 3 stents. C/O mild chest discomfort 1/10, comes and goes at rest, no changes in EKG or vital signs, same as last night throughout AM. Tele NSR. B/P unremarkable. LS clear. CMS+. Pulses palpable. Bedrest till 1930 tonight. Lt arterial sheath removed, hemostasis achieved 1330.  Site CDI. Pacer/ venous Sheath in Rt, Cardiology to remove. Poor PO intake. Voiding in urinal in bed. Family at bedside and involved in plan of care, Supportive. Nursing to follow closely.

## 2020-03-07 ENCOUNTER — APPOINTMENT (OUTPATIENT)
Dept: OCCUPATIONAL THERAPY | Facility: CLINIC | Age: 50
End: 2020-03-07
Attending: INTERNAL MEDICINE
Payer: COMMERCIAL

## 2020-03-07 LAB
ANION GAP SERPL CALCULATED.3IONS-SCNC: <1 MMOL/L (ref 3–14)
BUN SERPL-MCNC: 14 MG/DL (ref 7–30)
CALCIUM SERPL-MCNC: 8.8 MG/DL (ref 8.5–10.1)
CHLORIDE SERPL-SCNC: 111 MMOL/L (ref 94–109)
CO2 SERPL-SCNC: 28 MMOL/L (ref 20–32)
CREAT SERPL-MCNC: 0.89 MG/DL (ref 0.66–1.25)
ERYTHROCYTE [DISTWIDTH] IN BLOOD BY AUTOMATED COUNT: 13.2 % (ref 10–15)
GFR SERPL CREATININE-BSD FRML MDRD: >90 ML/MIN/{1.73_M2}
GLUCOSE BLDC GLUCOMTR-MCNC: 111 MG/DL (ref 70–99)
GLUCOSE BLDC GLUCOMTR-MCNC: 91 MG/DL (ref 70–99)
GLUCOSE BLDC GLUCOMTR-MCNC: 94 MG/DL (ref 70–99)
GLUCOSE SERPL-MCNC: 104 MG/DL (ref 70–99)
HCT VFR BLD AUTO: 40.6 % (ref 40–53)
HGB BLD-MCNC: 13.3 G/DL (ref 13.3–17.7)
INTERPRETATION ECG - MUSE: NORMAL
INTERPRETATION ECG - MUSE: NORMAL
MAGNESIUM SERPL-MCNC: 1.9 MG/DL (ref 1.6–2.3)
MCH RBC QN AUTO: 27.7 PG (ref 26.5–33)
MCHC RBC AUTO-ENTMCNC: 32.8 G/DL (ref 31.5–36.5)
MCV RBC AUTO: 84 FL (ref 78–100)
PHOSPHATE SERPL-MCNC: 2.1 MG/DL (ref 2.5–4.5)
PLATELET # BLD AUTO: 239 10E9/L (ref 150–450)
POTASSIUM SERPL-SCNC: 4.5 MMOL/L (ref 3.4–5.3)
RBC # BLD AUTO: 4.81 10E12/L (ref 4.4–5.9)
SODIUM SERPL-SCNC: 139 MMOL/L (ref 133–144)
WBC # BLD AUTO: 11.1 10E9/L (ref 4–11)

## 2020-03-07 PROCEDURE — 36415 COLL VENOUS BLD VENIPUNCTURE: CPT | Performed by: HOSPITALIST

## 2020-03-07 PROCEDURE — 97165 OT EVAL LOW COMPLEX 30 MIN: CPT | Mod: GO | Performed by: OCCUPATIONAL THERAPIST

## 2020-03-07 PROCEDURE — 83735 ASSAY OF MAGNESIUM: CPT | Performed by: HOSPITALIST

## 2020-03-07 PROCEDURE — 85018 HEMOGLOBIN: CPT | Performed by: HOSPITALIST

## 2020-03-07 PROCEDURE — 99233 SBSQ HOSP IP/OBS HIGH 50: CPT | Performed by: INTERNAL MEDICINE

## 2020-03-07 PROCEDURE — 25000128 H RX IP 250 OP 636: Performed by: HOSPITALIST

## 2020-03-07 PROCEDURE — 25000132 ZZH RX MED GY IP 250 OP 250 PS 637: Performed by: HOSPITALIST

## 2020-03-07 PROCEDURE — 97110 THERAPEUTIC EXERCISES: CPT | Mod: GO | Performed by: OCCUPATIONAL THERAPIST

## 2020-03-07 PROCEDURE — 80048 BASIC METABOLIC PNL TOTAL CA: CPT | Performed by: HOSPITALIST

## 2020-03-07 PROCEDURE — 21000001 ZZH R&B HEART CARE

## 2020-03-07 PROCEDURE — 00000146 ZZHCL STATISTIC GLUCOSE BY METER IP

## 2020-03-07 PROCEDURE — 99232 SBSQ HOSP IP/OBS MODERATE 35: CPT | Performed by: HOSPITALIST

## 2020-03-07 PROCEDURE — 84100 ASSAY OF PHOSPHORUS: CPT | Performed by: HOSPITALIST

## 2020-03-07 PROCEDURE — 85027 COMPLETE CBC AUTOMATED: CPT | Performed by: HOSPITALIST

## 2020-03-07 PROCEDURE — 25000132 ZZH RX MED GY IP 250 OP 250 PS 637: Performed by: INTERNAL MEDICINE

## 2020-03-07 RX ADMIN — ASPIRIN 81 MG: 81 TABLET, DELAYED RELEASE ORAL at 09:09

## 2020-03-07 RX ADMIN — MAGNESIUM SULFATE HEPTAHYDRATE 2 G: 40 INJECTION, SOLUTION INTRAVENOUS at 07:02

## 2020-03-07 RX ADMIN — TICAGRELOR 90 MG: 90 TABLET ORAL at 18:20

## 2020-03-07 RX ADMIN — TICAGRELOR 90 MG: 90 TABLET ORAL at 05:43

## 2020-03-07 RX ADMIN — METOPROLOL TARTRATE 25 MG: 25 TABLET ORAL at 09:10

## 2020-03-07 RX ADMIN — METOPROLOL TARTRATE 25 MG: 25 TABLET ORAL at 21:16

## 2020-03-07 RX ADMIN — LISINOPRIL 10 MG: 10 TABLET ORAL at 09:10

## 2020-03-07 RX ADMIN — HEPARIN SODIUM 5000 UNITS: 5000 INJECTION, SOLUTION INTRAVENOUS; SUBCUTANEOUS at 05:43

## 2020-03-07 RX ADMIN — NICOTINE 1 PATCH: 21 PATCH, EXTENDED RELEASE TRANSDERMAL at 12:34

## 2020-03-07 RX ADMIN — ATORVASTATIN CALCIUM 40 MG: 40 TABLET, FILM COATED ORAL at 09:10

## 2020-03-07 NOTE — PLAN OF CARE
PLAN: Cardiac rehab today     CNS: A&Ox4.   CVS: Tele SR. Mild chest discomfort when RN arrived. Resolved. BP had been elevated. Metoprolol and lisinopril re started this shift.    RESP: Lungs clear. Room air.   : Voiding.   MOBILITY: Up with SBA.   IV: Capped  SKIN: Bilateral groin sites CDI.

## 2020-03-07 NOTE — PLAN OF CARE
Discharge Planner OT   Patient plan for discharge: Home  Current status: CR eval completed and treatment initiated. Pt and significant other were educated on Phase 1 CR, including effort scale, importance of aerobic exercise, OP CR, symptoms of activity intolerance, and smoking cessation. Dispensed corresponding handouts. He completed transfers and ADLs (I)ly, walked in dutta to/from rehab satellite (I)ly. Completed a flight of stairs (I)ly with appropriate CV response and ambulated on treadmill x 15 min, ramping up to 2.0. Did have mild symptoms of dizziness and diaphoresis, fatigue. Updated RN.  Barriers to return to prior living situation: none anticipated  Recommendations for discharge: Home with OP CR  Rationale for recommendations: Phase 2 CR       Entered by: Gricel Drummond 03/07/2020 10:51 AM        56 year old female with pmhx of hemorrhoidectomy and pilonidal cyst removal 3 weeks ago , presents with right sided CP since last night. pt admits to pressure and sharp pains, which has improved some. Pt also admits to right calf pain since Tuesday. No Sob, cough, fever, calf swelling, or cigarette use.

## 2020-03-07 NOTE — PROVIDER NOTIFICATION
MD Notification    Notified Person: hospitalist    Notified Person Name: Dr. Daniel    Notification Date/Time: 3/6/2020 1840    Notification Interaction: answering service, telephone call    Purpose of Notification: pt post cath lab, stents x3, temp PM, remains on bedrest with BPs consistently 150/100s. No antihypertensive meds scheduled and no PRNs. Continue to monitor or have some PRNs available?     Orders Received: Continue to monitor for now.    Comments:

## 2020-03-07 NOTE — PROGRESS NOTES
SPIRITUAL HEALTH SERVICES Progress Note  [facility]U    Visit per Health Care Directive. Pt was sitting with wife, pt initially voiced confusion about HCD, but then remembered he had requested one the night before. SH explained form to pt and his wife, who said they would fill it out later. SH provided spiritual and emotional support and remains available for any requests.       Toshia Woods   Intern

## 2020-03-07 NOTE — PROGRESS NOTES
03/07/20 0802   Quick Adds   Type of Visit Initial Occupational Therapy Evaluation   Living Environment   Lives With significant other;child(angelita), dependent;child(angelita), adult   Living Arrangements house   Living Environment Comment 12 steps to basement   Self-Care   Usual Activity Tolerance good   Current Activity Tolerance fair   Regular Exercise No   Equipment Currently Used at Home none   Functional Level   Ambulation 0-->independent   Transferring 0-->independent   Toileting 0-->independent   Bathing 0-->independent   Dressing 0-->independent   Eating 0-->independent   Communication 0-->understands/communicates without difficulty   Swallowing 0-->swallows foods/liquids without difficulty   Cognition 0 - no cognition issues reported   Fall history within last six months no      Language English   General Information   Onset of Illness/Injury or Date of Surgery - Date 03/05/20   Referring Physician Eva Caceres MD   Patient/Family Goals Statement Return home, attend OP CR at [facility]   Additional Occupational Profile Info/Pertinent History of Current Problem 49-year-old male with hypertension and early family history of coronary disease who presents with ST elevation myocardial infarction of the anterior wall complicated by cardiogenic shock as well as complete heart block needing a temporary dopamine infusion and transvenous pacing.  Urgently taken to cardiac catheterization lab and found to have RCA occlusion and he is status post 3 drug-eluting stents.  Residual disease in the LAD as well as left circumflex.  Post reperfusion EKG normalized as well as blood pressures then he had acute stent occlusion and recurrent complete heart block necessitating re-intervention and temp pacer placement   Precautions/Limitations no known precautions/limitations   Cognitive Status Examination   Orientation orientation to person, place and time   Level of Consciousness alert   Follows Commands  "(Cognition) WNL   Sensory Examination   Sensory Quick Adds No deficits were identified   Pain Assessment   Patient Currently in Pain No   Integumentary/Edema   Integumentary/Edema no deficits were identifed   Posture   Posture not impaired   Range of Motion (ROM)   ROM Quick Adds No deficits were identified   Strength   Manual Muscle Testing Quick Adds No deficits were identified   Mobility   Bed Mobility Comments (I) supine<>EOB   Transfer Skill: Sit to Stand   Level of [place]: Sit/Stand independent   Transfer Skill: Toilet Transfer   Level of [place]: Toilet independent   Balance   Balance Comments (I) ambulation without AD   Instrumental Activities of Daily Living (IADL)   Previous Responsibilities meal prep;driving;;work   Activities of Daily Living Analysis   Impairments Contributing to Impaired Activities of Daily Living strength decreased  (impaired activity tolerance)   General Therapy Interventions   Planned Therapy Interventions progressive activity/exercise;risk factor education   Clinical Impression   Criteria for Skilled Therapeutic Interventions Met yes, treatment indicated   OT Diagnosis Impaired activity tolerance for IADL   Influenced by the following impairments medical status   Assessment of Occupational Performance 1-3 Performance Deficits   Identified Performance Deficits IADL, work   Clinical Decision Making (Complexity) Low complexity   Therapy Frequency Daily   Predicted Duration of Therapy Intervention (days/wks) 2 days   Anticipated Discharge Disposition Home with Outpatient Therapy   Risks and Benefits of Treatment have been explained. Yes   Patient, Family & other staff in agreement with plan of care Yes   Clinical Impression Comments Anticipate pt to leave tomorrow morning after 1 CR session   [facility] AM-PAC TM \"6 Clicks\"   2016, Trustees of [facility], under license to [employer].  All rights reserved.   6 Clicks Short Forms Daily Activity " "Inpatient Short Form   [employer] AM-PAC  \"6 Clicks\" Daily Activity Inpatient Short Form   1. Putting on and taking off regular lower body clothing? 4 - None   2. Bathing (including washing, rinsing, drying)? 4 - None   3. Toileting, which includes using toilet, bedpan or urinal? 4 - None   4. Putting on and taking off regular upper body clothing? 4 - None   5. Taking care of personal grooming such as brushing teeth? 4 - None   6. Eating meals? 4 - None   Daily Activity Raw Score (Score out of 24.Lower scores equate to lower levels of function) 24   Total Evaluation Time   Total Evaluation Time (Minutes) 5     "

## 2020-03-07 NOTE — PLAN OF CARE
Pt is A&O. Denies CP currently. Tele is SR with PVC. R&L groin sites are WDL. Pt is on bedrest until 7:30 pm. Good peripheral pulses. MD notified about elevated BPs. No definite discharge plans.

## 2020-03-07 NOTE — PROGRESS NOTES
[facility]    Cardiology Progress Note     Assessment & Plan     In summary this is a 49-year-old male with hypertension and early family history of coronary disease who presents with ST elevation myocardial infarction of the anterior wall complicated by cardiogenic shock as well as complete heart block needing a temporary dopamine infusion and transvenous pacing.  Urgently taken to cardiac catheterization lab and found to have RCA occlusion and he is status post 3 drug-eluting stents.  Residual disease in the LAD as well as left circumflex.  Post reperfusion EKG normalized as well as blood pressures then he had acute stent occlusion and recurrent complete heart block necessitating re-intervention and temp pacer placement. Received 18 hours eptifibitide infusion. Chest pain free presently with stable blood pressures.      Summary recommendations:  Continue aspirin and ticagrelor for at least 1 year.    Continue Lipitor  Continue lisinopril 10 mg  Continue metoprolol 25 mg twice daily  Echocardiogram notes an LVEF of 60 to 65% with hypokinesis of the basal inferior wall  Per interventional note outpatient reassessment for PCI of the LAD and possible functional study to assess the circumflex.  We will keep the patient in hospital today.  He will need to participate in cardiac rehab and have this arranged.  If he remains stable he can possibly discharge tomorrow.    Abundio Smith MD    Interval History   Patient did well overnight with no acute events.    Physical Exam   Temp: 98.8  F (37.1  C) Temp src: Oral BP: 133/89 Pulse: 79 Heart Rate: 82 Resp: 18 SpO2: 98 % O2 Device: None (Room air)    Vitals:    03/05/20 1800 03/06/20 0200 03/07/20 0541   Weight: 82.6 kg (182 lb 1.6 oz) 82.8 kg (182 lb 8.7 oz) 81.9 kg (180 lb 9.6 oz)     Vital Signs with Ranges  Temp:  [98.3  F (36.8  C)-99.6  F (37.6  C)] 98.8  F (37.1  C)  Pulse:  [73-97] 79  Heart Rate:  [71-98] 82  Resp:  [8-33] 18  BP:  (118-158)/() 133/89  MAP:  [88 mmHg-100 mmHg] 100 mmHg  Arterial Line BP: (119-136)/(68-75) 136/75  SpO2:  [97 %-100 %] 98 %  I/O last 3 completed shifts:  In: 680 [P.O.:680]  Out: 1050 [Urine:1050]    GENERAL APPEARANCE: Alert and oriented x3. No acute distress.  SKIN: Inspection of the skin reveals no rashes, ulcerations, warm, dry  NECK: Supple and symmetric.   Normal JVP  LUNGS: Clear breath sounds throughout bilaterally, no wheezes, no rhonchi  CARDIOVASCULAR: S1, S2, regular rate and rhythm without any murmurs, gallops, rubs. The carotid pulses were normal and 2+ bilaterally without bruits. Peripheral pulses were 2+ and symmetric.  ABDOMEN: Soft, non-tender, non-distended with normal bowel sounds.  No ascites noted.  EXTREMITIES: No edema.  NEUROLOGIC:  Normal mood and affect.  Sensation to touch was normal.    Medications     - MEDICATION INSTRUCTIONS -       - MEDICATION INSTRUCTIONS -       Percutaneous Coronary Intervention orders placed (this is information for BPA alerting)       ACE/ARB/ARNI NOT PRESCRIBED       BETA BLOCKER NOT PRESCRIBED         aspirin  81 mg Oral Daily     atorvastatin  40 mg Oral Daily     heparin ANTICOAGULANT  5,000 Units Subcutaneous Q8H     insulin aspart  1-7 Units Subcutaneous TID AC     insulin aspart  1-5 Units Subcutaneous At Bedtime     lisinopril  10 mg Oral Daily     metoprolol tartrate  25 mg Oral BID     nicotine  1 patch Transdermal Daily     nicotine   Transdermal Q8H     sodium chloride (PF)  3 mL Intracatheter Q8H     ticagrelor  90 mg Oral Q12H       Data   Results for orders placed or performed during the hospital encounter of 03/05/20 (from the past 24 hour(s))   Pharmacy Liaison for Medication Coverage    Narrative    Mariola Landaverde     3/6/2020  2:36 PM  Medication coverage check for Brilinta. $24.99 monthly.    Mariola Rosa CphT  [facility] Discharge Pharmacy Liaison  Liaison Cell: [phone]   Glucose by meter   Result Value Ref Range     Glucose 128 (H) 70 - 99 mg/dL   Troponin I   Result Value Ref Range    Troponin I ES 24.784 (HH) 0.000 - 0.045 ug/L   Glucose by meter   Result Value Ref Range    Glucose 124 (H) 70 - 99 mg/dL   Glucose by meter   Result Value Ref Range    Glucose 104 (H) 70 - 99 mg/dL   Glucose by meter   Result Value Ref Range    Glucose 111 (H) 70 - 99 mg/dL   Magnesium   Result Value Ref Range    Magnesium 1.9 1.6 - 2.3 mg/dL   Phosphorus   Result Value Ref Range    Phosphorus 2.1 (L) 2.5 - 4.5 mg/dL   Basic metabolic panel   Result Value Ref Range    Sodium 139 133 - 144 mmol/L    Potassium 4.5 3.4 - 5.3 mmol/L    Chloride 111 (H) 94 - 109 mmol/L    Carbon Dioxide 28 20 - 32 mmol/L    Anion Gap <1 (L) 3 - 14 mmol/L    Glucose 104 (H) 70 - 99 mg/dL    Urea Nitrogen 14 7 - 30 mg/dL    Creatinine 0.89 0.66 - 1.25 mg/dL    GFR Estimate >90 >60 mL/min/[1.73_m2]    GFR Estimate If Black >90 >60 mL/min/[1.73_m2]    Calcium 8.8 8.5 - 10.1 mg/dL   CBC with platelets   Result Value Ref Range    WBC 11.1 (H) 4.0 - 11.0 10e9/L    RBC Count 4.81 4.4 - 5.9 10e12/L    Hemoglobin 13.3 13.3 - 17.7 g/dL    Hematocrit 40.6 40.0 - 53.0 %    MCV 84 78 - 100 fl    MCH 27.7 26.5 - 33.0 pg    MCHC 32.8 31.5 - 36.5 g/dL    RDW 13.2 10.0 - 15.0 %    Platelet Count 239 150 - 450 10e9/L

## 2020-03-07 NOTE — PROGRESS NOTES
[facility]    Hospitalist Progress Note    Date of Admission:  3/5/2020    Assessment & Plan     Candelario Stahl is a 49 year old man from [place] with personal history of HTN, tobacco dependence and family history of CAD who was admitted on 3/5/2020 with acute inferior ST elevation MI with third-degree heart block.      Acute inferior STEMI    CAD   Cardiogenic shock, improving    Third-degree heart block  Acute hypoxemia secondary to above [resolved with treatment of above]  Taken emergently to Cath Lab.  Found to have culprit lesion in RCA.  S/p KORTNEY x3 from ostial to distal RCA.  Residual disease noted in distal LAD, borderline circumflex OM lesions.   He required pressor support with Maynor-Synephrine as well as dopamine infusion during and after procedure , that was later able to be weaned off.  Temporary venous pacemaker was placed initially and taken off by end of the procedure as he had converted to normal sinus rhythm.  Patient had recurrent chest pain and heart block after the procedure and he was taken back to Cath Lab emergently and temporary pacemaker was placed again.  Repeat coronary angiography demonstrated closure of previously placed stents in mid segment.  This was opened with balloon inflation.   Patient appeared hemodynamically stable, temporary pacemaker left in place and patient transferred back to ICU.    -Continue Brilinta, aspirin, statin  -Transitioned off Integrilin drip  -Temporary pacemaker removed on 3/6  -Started on lisinopril and metoprolol on 3/6  -Lipids: HDL 35, , TG 43,   -Hemoglobin A1c 5.7  - Outpatient stress test for residual LAD and left circumflex disease  -Cardiac rehab consulted.   -Echocardiogram notes an LVEF of 60 to 65% with hypokinesis of the basal inferior wall     Leukocytosis (15), stress reaction. H/o congestion without fever or body aches last week which are resolving. Suspect minor URI.  I do not suspect flu, and not likely  contributing to WBC.   -Resolved following day     Tobacco dependence - Patient smokes over a pack per day.  -We discussed the absolute necessity of complete tobacco cessation.  His family was present at all and are in agreement  -Discussed nicotine replacement therapy. Patient was in agreement with this.  Will use a nicotine patch and gum as needed.    Occasional small-volume bright red blood per rectum, suspect hemorrhoids -  Patient states that he small he passes a small amount of blood when with bowel movements when they are hard to pass.  Denies any current symptoms or irritation.  Hemoglobin at admission is 15.1 with a normal MCV.   -We discussed monitoring this and the increased risk of bleeding on aspirin and Brilinta.   -Further evaluation as outpatient.      Diet:  Regular  DVT Prophylaxis:  Ambulate  Perry Catheter: not present  Code Status: Full     Disposition: Anticipate discharge home tomorrow.      Eva Caceres MD  Text Page (7am - 6pm, M-F)    Interval History   Patient denies chest pain this morning.  No shortness of breath.  Slept well last night.  Ate a good breakfast this morning.  No concerns.    -Data reviewed today: I reviewed all new labs and imaging results over the last 24 hours. I personally reviewed EKG with result as noted above    Physical Exam   Temp: 98.9  F (37.2  C) Temp src: Oral BP: 124/75 Pulse: 71 Heart Rate: 68 Resp: 12 SpO2: 99 % O2 Device: None (Room air)    Vitals:    03/05/20 1800 03/06/20 0200 03/07/20 0541   Weight: 82.6 kg (182 lb 1.6 oz) 82.8 kg (182 lb 8.7 oz) 81.9 kg (180 lb 9.6 oz)     Vital Signs with Ranges  Temp:  [98.3  F (36.8  C)-99.6  F (37.6  C)] 98.9  F (37.2  C)  Pulse:  [71-97] 71  Heart Rate:  [68-98] 68  Resp:  [8-33] 12  BP: (124-158)/() 124/75  SpO2:  [97 %-100 %] 99 %  I/O last 3 completed shifts:  In: 680 [P.O.:680]  Out: 1050 [Urine:1050]    Constitutional: Alert, appears comfortable, in no acute distress  Respiratory: Non labored  breathing, clear to auscultation bilaterally, no crackles or wheezes  Cardiovascular: Heart sounds regular rate and rhythm, no murmurs, no leg edema  GI: Abdomen is soft, non distended, non tender. Normal BS  Skin/Integumen: no rashes, no pressure sores  Neuro: alert, converses appropriately, moving all extremities, fluent speech, no facial asymmetry  Psych: mood and affect appropriate      Medications     - MEDICATION INSTRUCTIONS -       - MEDICATION INSTRUCTIONS -       Percutaneous Coronary Intervention orders placed (this is information for BPA alerting)       ACE/ARB/ARNI NOT PRESCRIBED       BETA BLOCKER NOT PRESCRIBED         aspirin  81 mg Oral Daily     atorvastatin  40 mg Oral Daily     heparin ANTICOAGULANT  5,000 Units Subcutaneous Q8H     insulin aspart  1-7 Units Subcutaneous TID AC     insulin aspart  1-5 Units Subcutaneous At Bedtime     lisinopril  10 mg Oral Daily     metoprolol tartrate  25 mg Oral BID     nicotine  1 patch Transdermal Daily     nicotine   Transdermal Q8H     sodium chloride (PF)  3 mL Intracatheter Q8H     ticagrelor  90 mg Oral Q12H       Data   Recent Labs   Lab 03/07/20  0621 03/06/20  1215 03/06/20  0510 03/06/20  0400 03/05/20  1843 03/05/20  1420   WBC 11.1*  --   --  8.3  --  15.6*   HGB 13.3  --  12.8* 12.7*  --  15.1   MCV 84  --   --  84  --  84     --   --  252  --  312   INR  --   --   --   --   --  1.08     --   --  139  --  141   POTASSIUM 4.5  --   --  3.8  --  3.5   CHLORIDE 111*  --   --  112*  --  112*   CO2 28  --   --  23  --  20   BUN 14  --   --  21  --  20   CR 0.89  --   --  0.81  --  1.07   ANIONGAP <1*  --   --  4  --  9   RINA 8.8  --   --  8.0*  --  8.2*   *  --   --  110*  --  152*   TROPI  --  24.784*  --  37.115* 10.827* <0.015       Imaging  No results found for this or any previous visit (from the past 24 hour(s)).

## 2020-03-08 ENCOUNTER — APPOINTMENT (OUTPATIENT)
Dept: OCCUPATIONAL THERAPY | Facility: CLINIC | Age: 50
End: 2020-03-08
Payer: COMMERCIAL

## 2020-03-08 VITALS
WEIGHT: 177.3 LBS | DIASTOLIC BLOOD PRESSURE: 79 MMHG | OXYGEN SATURATION: 100 % | SYSTOLIC BLOOD PRESSURE: 119 MMHG | TEMPERATURE: 98.2 F | RESPIRATION RATE: 16 BRPM | HEART RATE: 77 BPM

## 2020-03-08 LAB
ERYTHROCYTE [DISTWIDTH] IN BLOOD BY AUTOMATED COUNT: 13 % (ref 10–15)
GLUCOSE BLDC GLUCOMTR-MCNC: 100 MG/DL (ref 70–99)
HCT VFR BLD AUTO: 41.6 % (ref 40–53)
HGB BLD-MCNC: 14 G/DL (ref 13.3–17.7)
MAGNESIUM SERPL-MCNC: 2.1 MG/DL (ref 1.6–2.3)
MCH RBC QN AUTO: 28 PG (ref 26.5–33)
MCHC RBC AUTO-ENTMCNC: 33.7 G/DL (ref 31.5–36.5)
MCV RBC AUTO: 83 FL (ref 78–100)
PHOSPHATE SERPL-MCNC: 3.7 MG/DL (ref 2.5–4.5)
PLATELET # BLD AUTO: 244 10E9/L (ref 150–450)
RBC # BLD AUTO: 5 10E12/L (ref 4.4–5.9)
WBC # BLD AUTO: 10.3 10E9/L (ref 4–11)

## 2020-03-08 PROCEDURE — 25000132 ZZH RX MED GY IP 250 OP 250 PS 637: Performed by: HOSPITALIST

## 2020-03-08 PROCEDURE — 83735 ASSAY OF MAGNESIUM: CPT | Performed by: HOSPITALIST

## 2020-03-08 PROCEDURE — 25000132 ZZH RX MED GY IP 250 OP 250 PS 637: Performed by: INTERNAL MEDICINE

## 2020-03-08 PROCEDURE — 00000146 ZZHCL STATISTIC GLUCOSE BY METER IP

## 2020-03-08 PROCEDURE — 97110 THERAPEUTIC EXERCISES: CPT | Mod: GO | Performed by: OCCUPATIONAL THERAPIST

## 2020-03-08 PROCEDURE — 99239 HOSP IP/OBS DSCHRG MGMT >30: CPT | Performed by: HOSPITALIST

## 2020-03-08 PROCEDURE — 85027 COMPLETE CBC AUTOMATED: CPT | Performed by: HOSPITALIST

## 2020-03-08 PROCEDURE — 36415 COLL VENOUS BLD VENIPUNCTURE: CPT | Performed by: HOSPITALIST

## 2020-03-08 PROCEDURE — 85018 HEMOGLOBIN: CPT | Performed by: HOSPITALIST

## 2020-03-08 PROCEDURE — 84100 ASSAY OF PHOSPHORUS: CPT | Performed by: HOSPITALIST

## 2020-03-08 PROCEDURE — 99232 SBSQ HOSP IP/OBS MODERATE 35: CPT | Performed by: INTERNAL MEDICINE

## 2020-03-08 RX ORDER — LISINOPRIL 10 MG/1
10 TABLET ORAL DAILY
Qty: 30 TABLET | Refills: 0 | Status: SHIPPED | OUTPATIENT
Start: 2020-03-09 | End: 2020-03-11

## 2020-03-08 RX ORDER — METOPROLOL TARTRATE 25 MG/1
25 TABLET, FILM COATED ORAL 2 TIMES DAILY
Qty: 60 TABLET | Refills: 0 | Status: SHIPPED | OUTPATIENT
Start: 2020-03-08 | End: 2020-03-11

## 2020-03-08 RX ORDER — ATORVASTATIN CALCIUM 40 MG/1
40 TABLET, FILM COATED ORAL DAILY
Qty: 30 TABLET | Refills: 0 | Status: SHIPPED | OUTPATIENT
Start: 2020-03-09 | End: 2020-03-11

## 2020-03-08 RX ADMIN — METOPROLOL TARTRATE 25 MG: 25 TABLET ORAL at 11:17

## 2020-03-08 RX ADMIN — TICAGRELOR 90 MG: 90 TABLET ORAL at 06:26

## 2020-03-08 RX ADMIN — ASPIRIN 81 MG: 81 TABLET, DELAYED RELEASE ORAL at 11:16

## 2020-03-08 RX ADMIN — NICOTINE 1 PATCH: 21 PATCH, EXTENDED RELEASE TRANSDERMAL at 11:18

## 2020-03-08 RX ADMIN — LISINOPRIL 10 MG: 10 TABLET ORAL at 11:17

## 2020-03-08 RX ADMIN — ATORVASTATIN CALCIUM 40 MG: 40 TABLET, FILM COATED ORAL at 11:17

## 2020-03-08 NOTE — PLAN OF CARE
Pt is A&O. Denies CP. Tele shows SR. Angio sites to bilateral groins are unchanged and WDL. Pt is tolerating activity. Up independently in room. Received discharge instructions from cardiology and hospitalist. IVs removed and tele discontinued. Pt received all discharge instructions with AVS, all medications, and verified he had all belongings before he left the hospital. Left unit via w/c. Friend to provide transportation home.

## 2020-03-08 NOTE — PLAN OF CARE
Pt is A&O. Denies CP this shift. Tele shows SR. Dressings to groin sites changed. Sites are WDL with small amount of bruising. Provided education and handout about activity restrictions following heart cath. Pt will need to be established with PCP at discharge. Pt is independent in room and calls appropriately. Will likely discharge home tomorrow.

## 2020-03-08 NOTE — PROGRESS NOTES
[facility]    Cardiology Progress Note     Assessment & Plan     In summary this is a 49-year-old male with hypertension and early family history of coronary disease who presents with ST elevation myocardial infarction of the anterior wall complicated by cardiogenic shock as well as complete heart block needing a temporary dopamine infusion and transvenous pacing.  Urgently taken to cardiac catheterization lab and found to have RCA occlusion and he is status post 3 drug-eluting stents.  Residual disease in the LAD as well as left circumflex.  Post reperfusion EKG normalized as well as blood pressures then he had acute stent occlusion and recurrent complete heart block necessitating re-intervention and temp pacer placement. Received 18 hours eptifibitide infusion. Chest pain free presently with stable blood pressures.      Summary recommendations:  Continue aspirin and ticagrelor for at least 1 year.    Continue Lipitor  Continue lisinopril 10 mg  Continue metoprolol 25 mg twice daily  Echocardiogram notes an LVEF of 60 to 65% with hypokinesis of the basal inferior wall  Per interventional note outpatient reassessment for PCI of the LAD and possible functional study to assess the circumflex.  Patient participated in cardiac rehab and did not have any chest pain.  Patient is stable to be discharged today.  I will put in for a return visit in 1 week to discuss intervention on his LAD and management of his circumflex lesions.    Abundio Smith MD    Interval History   Patient did well overnight with no acute events.    Physical Exam   Temp: 98.2  F (36.8  C) Temp src: Oral BP: 121/80 Pulse: 77 Heart Rate: 75 Resp: 15 SpO2: 99 % O2 Device: None (Room air)    Vitals:    03/06/20 0200 03/07/20 0541 03/08/20 0615   Weight: 82.8 kg (182 lb 8.7 oz) 81.9 kg (180 lb 9.6 oz) 80.4 kg (177 lb 4.8 oz)     Vital Signs with Ranges  Temp:  [98.2  F (36.8  C)-99.2  F (37.3  C)] 98.2  F (36.8  C)  Pulse:   [71-77] 77  Heart Rate:  [75] 75  Resp:  [12-16] 15  BP: (116-138)/(73-98) 121/80  SpO2:  [99 %] 99 %  I/O last 3 completed shifts:  In: 243 [P.O.:240; I.V.:3]  Out: -     GENERAL APPEARANCE: Alert and oriented x3. No acute distress.  SKIN: Inspection of the skin reveals no rashes, ulcerations, warm, dry  NECK: Supple and symmetric.   Normal JVP  LUNGS: Clear breath sounds throughout bilaterally, no wheezes, no rhonchi  CARDIOVASCULAR: S1, S2, regular rate and rhythm without any murmurs, gallops, rubs. The carotid pulses were normal and 2+ bilaterally without bruits. Peripheral pulses were 2+ and symmetric.  ABDOMEN: Soft, non-tender, non-distended with normal bowel sounds.  No ascites noted.  EXTREMITIES: No edema.  NEUROLOGIC:  Normal mood and affect.  Sensation to touch was normal.    Medications     - MEDICATION INSTRUCTIONS -       - MEDICATION INSTRUCTIONS -       Percutaneous Coronary Intervention orders placed (this is information for BPA alerting)       ACE/ARB/ARNI NOT PRESCRIBED       BETA BLOCKER NOT PRESCRIBED         aspirin  81 mg Oral Daily     atorvastatin  40 mg Oral Daily     heparin ANTICOAGULANT  5,000 Units Subcutaneous Q8H     insulin aspart  1-7 Units Subcutaneous TID AC     insulin aspart  1-5 Units Subcutaneous At Bedtime     lisinopril  10 mg Oral Daily     metoprolol tartrate  25 mg Oral BID     nicotine  1 patch Transdermal Daily     nicotine   Transdermal Q8H     sodium chloride (PF)  3 mL Intracatheter Q8H     ticagrelor  90 mg Oral Q12H       Data   Results for orders placed or performed during the hospital encounter of 03/05/20 (from the past 24 hour(s))   Glucose by meter   Result Value Ref Range    Glucose 91 70 - 99 mg/dL   Glucose by meter   Result Value Ref Range    Glucose 94 70 - 99 mg/dL   Glucose by meter   Result Value Ref Range    Glucose 100 (H) 70 - 99 mg/dL   Magnesium   Result Value Ref Range    Magnesium 2.1 1.6 - 2.3 mg/dL   Phosphorus   Result Value Ref Range     Phosphorus 3.7 2.5 - 4.5 mg/dL   CBC with platelets   Result Value Ref Range    WBC 10.3 4.0 - 11.0 10e9/L    RBC Count 5.00 4.4 - 5.9 10e12/L    Hemoglobin 14.0 13.3 - 17.7 g/dL    Hematocrit 41.6 40.0 - 53.0 %    MCV 83 78 - 100 fl    MCH 28.0 26.5 - 33.0 pg    MCHC 33.7 31.5 - 36.5 g/dL    RDW 13.0 10.0 - 15.0 %    Platelet Count 244 150 - 450 10e9/L

## 2020-03-08 NOTE — PLAN OF CARE
Discharge Planner OT   Patient plan for discharge: Home today with family  Current status: Pt ambulated x 20 min on treadmill at 2.1 mph. Rates max effort at 5/10, denies CV symptoms other than mild dizziness that resolves in about 5 min after resting. HR max 90, /82, O2 100% following exercise. Education completed for cardiac risk factors, importance of aerobic exercise and attending OP CR, heart healthy diet, smoking cessation, signs of activity intolerance; pt verbalized understanding.  Barriers to return to prior living situation: none  Recommendations for discharge: Home with OP CR  Rationale for recommendations: Phase 2 OP CR       Entered by: Gricel Drummond 03/08/2020 9:19 AM

## 2020-03-08 NOTE — PLAN OF CARE
Occupational Therapy Discharge Summary    Reason for therapy discharge:    Discharged to home with outpatient therapy.    Progress towards therapy goal(s). See goals on Care Plan in [facility] electronic health record for goal details.  Goals met    Therapy recommendation(s):    Continued therapy is recommended.  Rationale/Recommendations:  Phase 2 OP CR; prefers to go to [facility].

## 2020-03-08 NOTE — DISCHARGE INSTRUCTIONS
[facility] will contact you this week to help you schedule an appointment with a Nurse Practitioner/Physicians Assistant.   [facility] [address]  [place] [address]  Phone: [phone]      Cardiac Angiogram Discharge Instructions - Femoral    After you go home:      Have an adult stay with you until tomorrow.    Drink extra fluids for 2 days.    You may resume your normal diet.    No smoking       For 24 hours - due to the sedation you received:    Relax and take it easy.    Do NOT make any important or legal decisions.    Do NOT drive or operate machines at home or at work.    Do NOT drink alcohol.    Care of Groin Puncture Site:      For the first 24 hrs - check the puncture site every 1-2 hours while awake.    For 2 days, when you cough, sneeze, laugh or move your bowels, hold your hand over the puncture site and press firmly.    Remove the bandaid after 24 hours. If there is minor oozing, apply another bandaid and remove it after 12 hours.    It is normal to have a small bruise or pea size lump at the site.    You may shower tomorrow. Do NOT take a bath, or use a hot tub or pool for at least 3 days. Do NOT scrub the site. Do not use lotion or powder near the puncture site.    Activity:            For 2 days:    No stooping or squatting    Do NOT do any heavy activity such as exercise, lifting, or straining.     No housework, yard work or any activity that make you sweat    Do NOT lift more than 10 pounds    Bleeding:      If you start bleeding from the site in your groin, lie down flat and press firmly on/above the site for 10 minutes.     Once bleeding stops, lay flat for 2 hours.     Call [facility] as soon as you can.       Call 911 right away if you have heavy bleeding or bleeding that does not stop.      Medicines:      If you are taking an antiplatelet medication such as Plavix, Brilinta or Effient, do not stop taking it until you talk to  your cardiologist.      If you are on Metformin (Glucophage), do not restart it until you have blood tests (within 2 to 3 days after discharge).  After you have your blood drawn, you may restart the Metformin.     Take your medications, including blood thinners, unless your provider tells you not to.  If you take Coumadin (Warfarin), have your INR checked by your provider in  3-5 days. Call your clinic to schedule this.    If you have stopped any medicines, check with your provider about when to restart them.    Follow Up Appointments:      Follow up with [facility] Nurse Practitioner at [facility] of patient preference in 7-10 days.    Call the clinic if:      You have increased pain or a large or growing hard lump around the site.    The site is red, swollen, hot or tender.    Blood or fluid is draining from the site.    You have chills or a fever greater than 101 F (38 C).    Your leg feels numb, cool or changes color.    You have hives, a rash or unusual itching.    New pain in the back or belly that you cannot control with Tylenol.    Any questions or concerns.          [facility] [place]:    [phone] [facility] (7 days a week)

## 2020-03-08 NOTE — PLAN OF CARE
VSS. Tele: SR. Denies pain. Bilat groin sites unchanged, bruising noted on left side. Up independently in room. Possible discharge today.

## 2020-03-08 NOTE — DISCHARGE SUMMARY
Discharge Summary  Hospitalist    Date of Admission:  3/5/2020  Date of Discharge:  3/8/2020  Discharging Provider: Eva Caceres MD  Date of Service (when I saw the patient): 03/08/20    Discharge Diagnoses   Acute inferior STEMI    CAD   Cardiogenic shock, improving    Third-degree heart block  Acute hypoxemia secondary to above [resolved with treatment of above]  Tobacco dependence      History of Present Illness   Please refer H & P for details.      Hospital Course   Candelario Stahl is a 49 year old man from [place] with personal history of HTN, tobacco dependence and family history of CAD who was admitted on 3/5/2020 with acute inferior ST elevation MI with third-degree heart block.      Acute inferior STEMI    CAD   Cardiogenic shock, improving    Third-degree heart block  Acute hypoxemia secondary to above [resolved with treatment of above]  Taken emergently to Cath Lab.  Found to have culprit lesion in RCA.  S/p KORTNEY x3 from ostial to distal RCA.  Residual disease noted in distal LAD, borderline circumflex OM lesions.   He required pressor support with Maynor-Synephrine as well as dopamine infusion during and after procedure , that was later able to be weaned off.  Temporary venous pacemaker was placed initially and taken off by end of the procedure as he had converted to normal sinus rhythm.  Patient had recurrent chest pain and heart block after the procedure and he was taken back to Cath Lab emergently and temporary pacemaker was placed again.  Repeat coronary angiography demonstrated closure of previously placed stents in mid segment.  This was opened with balloon inflation.   Patient appeared hemodynamically stable, temporary pacemaker left in place and patient transferred back to ICU.     -Continue Brilinta, aspirin, statin  -Transitioned off Integrilin drip to Brilinta  -Temporary pacemaker removed on 3/6  -Started on lisinopril and metoprolol on 3/6  -Lipids: HDL 35, , TG 43, TC  190  -Hemoglobin A1c 5.7  - Outpatient stress test for residual LAD and left circumflex disease  -Cardiac rehab consulted.   -Echocardiogram notes an LVEF of 60 to 65% with hypokinesis of the basal inferior wall     Leukocytosis (15), stress reaction. H/o congestion without fever or body aches last week which are resolving. Suspect minor URI.  I do not suspect flu, and not likely contributing to WBC.   -Resolved following day     Tobacco dependence - Patient smokes over a pack per day.  -We discussed the absolute necessity of complete tobacco cessation.  His family was present at all and are in agreement  -Discussed nicotine replacement therapy. Patient was in agreement with this.  Will use a nicotine patch and gum as needed.     Occasional small-volume bright red blood per rectum, suspect hemorrhoids -  Patient states that he small he passes a small amount of blood when with bowel movements when they are hard to pass.  Denies any current symptoms or irritation.  Hemoglobin at admission is 15.1 with a normal MCV.   -We discussed monitoring this and the increased risk of bleeding on aspirin and Brilinta.   -Further evaluation as outpatient.     Patient is stable at discharge.  Follow-up with cardiology.      Eva Caceres MD, MD      Pending Results   These results will be followed up by Hospitalist team.  Unresulted Labs Ordered in the Past 30 Days of this Admission     No orders found from 2/4/2020 to 3/6/2020.          Code Status   Full Code       Primary Care Physician   Physician No Ref-Primary    Follow-ups Needed After Discharge   Follow-up Appointments     Follow-up and recommended labs and tests       Follow up with primary care provider, Physician No Ref-Primary, within 7   days for hospital follow- up.  No follow up labs or test are needed.             Physical Exam   Temp: 98.2  F (36.8  C) Temp src: Oral BP: 119/79 Pulse: 77 Heart Rate: 75 Resp: 16 SpO2: 100 % O2 Device: None (Room air)    Vitals:     03/06/20 0200 03/07/20 0541 03/08/20 0615   Weight: 82.8 kg (182 lb 8.7 oz) 81.9 kg (180 lb 9.6 oz) 80.4 kg (177 lb 4.8 oz)     Vital Signs with Ranges  Temp:  [98.2  F (36.8  C)-99.2  F (37.3  C)] 98.2  F (36.8  C)  Pulse:  [72-77] 77  Heart Rate:  [68-75] 75  Resp:  [15-16] 16  BP: (116-138)/(73-98) 119/79  SpO2:  [99 %-100 %] 100 %  I/O last 3 completed shifts:  In: 243 [P.O.:240; I.V.:3]  Out: -     Constitutional: Alert, appears comfortable, in no acute distress  Respiratory: Non labored breathing, clear to auscultation bilaterally, no crackles or wheezes  Cardiovascular: Heart sounds regular rate and rhythm, no murmurs, no leg edema  GI: Abdomen is soft, non distended, non tender. Normal BS  Skin/Integumen: no rashes, no pressure sores  Neuro: alert, converses appropriately, moving all extremities, fluent speech, no facial asymmetry  Psych: mood and affect appropriate          Discharge Disposition   Discharged to home  Condition at discharge: Stable    Consultations This Hospital Stay   NUTRITION SERVICES ADULT IP CONSULT  CARDIAC REHAB IP CONSULT  PHARMACY IP CONSULT  PHARMACY IP CONSULT  NUTRITION SERVICES ADULT IP CONSULT  CARDIAC REHAB IP CONSULT  PHARMACY IP CONSULT  PHARMACY IP CONSULT  CARDIOLOGY IP CONSULT  HOSPITALIST IP CONSULT  PHARMACY LIAISON FOR MEDICATION COVERAGE CONSULT  ADVANCE DIRECTIVE IP CONSULT  CARE COORDINATOR IP CONSULT  SMOKING CESSATION PROGRAM IP CONSULT  SMOKING CESSATION PROGRAM IP CONSULT    Time Spent on this Encounter   IEva MD, personally saw the patient today and spent greater than 30 minutes discharging this patient.    Discharge Orders      CARDIAC REHAB REFERRAL      Follow-Up with Cardiologist      Reason for your hospital stay    You were hospitalized with chest pain secondary to a heart attack.  You underwent coronary angiography with stent placement.     Follow-up and recommended labs and tests     Follow up with primary care provider, Physician No  Ref-Primary, within 7 days for hospital follow- up.  No follow up labs or test are needed.     Activity    Your activity upon discharge: activity as tolerated     When to contact your care team    Call your primary doctor if you have any of the following: Worsening chest pain, shortness of breath, fevers, swelling or bleeding     Full Code     Diet    Follow this diet upon discharge: Orders Placed This Encounter      Advance Diet as Tolerated: Regular Diet Adult; Regular Diet Adult; Low Saturated Fat Na <2400mg Diet     Discharge Medications   Current Discharge Medication List      START taking these medications    Details   aspirin (ASA) 81 MG EC tablet Take 1 tablet (81 mg) by mouth daily  Qty: 100 tablet, Refills: 0    Associated Diagnoses: Coronary artery disease involving native coronary artery of native heart without angina pectoris      atorvastatin (LIPITOR) 40 MG tablet Take 1 tablet (40 mg) by mouth daily  Qty: 30 tablet, Refills: 0    Associated Diagnoses: Coronary artery disease involving native coronary artery of native heart without angina pectoris      metoprolol tartrate (LOPRESSOR) 25 MG tablet Take 1 tablet (25 mg) by mouth 2 times daily  Qty: 60 tablet, Refills: 0    Associated Diagnoses: Coronary artery disease involving native coronary artery of native heart without angina pectoris      ticagrelor (BRILINTA) 90 MG tablet Take 1 tablet (90 mg) by mouth every 12 hours  Qty: 60 tablet, Refills: 0    Associated Diagnoses: Coronary artery disease involving native coronary artery of native heart without angina pectoris         CONTINUE these medications which have CHANGED    Details   lisinopril (ZESTRIL) 10 MG tablet Take 1 tablet (10 mg) by mouth daily  Qty: 30 tablet, Refills: 0    Associated Diagnoses: Coronary artery disease involving native coronary artery of native heart without angina pectoris         CONTINUE these medications which have NOT CHANGED    Details   cetirizine (ZYRTEC) 10 MG  tablet Take 10 mg by mouth daily      multivitamin, therapeutic (THERA-VIT) TABS tablet Take 1 tablet by mouth Takes when remembers, usually once weekly.           Allergies   No Known Allergies  Data   Most Recent 3 CBC's:  Recent Labs   Lab Test 20  0538 20  0621 20  0510 20  0400   WBC 10.3 11.1*  --  8.3   HGB 14.0 13.3 12.8* 12.7*   MCV 83 84  --  84    239  --  252      Most Recent 3 BMP's:  Recent Labs   Lab Test 20  0621 20  0400 20  1420    139 141   POTASSIUM 4.5 3.8 3.5   CHLORIDE 111* 112* 112*   CO2 28 23 20   BUN 14 21 20   CR 0.89 0.81 1.07   ANIONGAP <1* 4 9   RINA 8.8 8.0* 8.2*   * 110* 152*     Most Recent 2 LFT's:No lab results found.  Most Recent INR's and Anticoagulation Dosing History:  Anticoagulation Dose History     Recent Dosing and Labs Latest Ref Rng & Units 3/5/2020    INR 0.86 - 1.14 1.08        Most Recent 3 Troponin's:  Recent Labs   Lab Test 20  1215 20  0400 20  1843   TROPI 24.784* 37.115* 10.827*     Most Recent Cholesterol Panel:  Recent Labs   Lab Test 20  1843   CHOL 190   *   HDL 35*   TRIG 43     Most Recent 6 Bacteria Isolates From Any Culture (See EPIC Reports for Culture Details):No lab results found.  Most Recent TSH, T4 and A1c Labs:  Recent Labs   Lab Test 20  0400   A1C 5.7*       Results for orders placed or performed during the hospital encounter of 20   XR Chest Port 1 View    Narrative    CHEST ONE VIEW  3/5/2020 2:33 PM     HISTORY: Acute chest pain/shortness of breath.    COMPARISON: None.      Impression    IMPRESSION: No acute disease.    ADELINA CERVANTES MD   Echocardiogram Complete    Narrative    [id number]  [id number]  [id number]  [id number]^SELVIN^OTILIO^WILD           [facility]  Echocardiography Laboratory  [facility] [place] [address]        Name: SALBADOR THOMPSON  MRN: [id number]  : 1970  Study Date: 2020 08:13  AM  Age: 49 yrs  Gender: Male  Patient [facility]: [facility]  Reason For Study: CAD  Ordering Physician: OTILIO MONTALVO  Performed By: Danni Boston     BSA: 1.9 m2  Height: 67 in  Weight: 169 lb  HR: 71  BP: 110/61 mmHg  _____________________________________________________________________________  __        Procedure  Complete Portable Echo Adult.  _____________________________________________________________________________  __        Interpretation Summary     Study only available for reading at 4 pm.  The visual ejection fraction is estimated at 60-65%.  Possible severe hypokinesis of the basal inferior wall. Contrast would  significantly improve wall motion analysis especially of the apical two  chamber view. All the other segments appear to contract normally.  The study was technically difficult.  _____________________________________________________________________________  __        Left Ventricle  The left ventricle is normal in size. There is normal left ventricular wall  thickness. Diastolic Doppler findings (E/E' ratio and/or other parameters)  suggest left ventricular filling pressures are indeterminate. The visual  ejection fraction is estimated at 60-65%. Possible severe hypokinesis of the  basal inferior wall. Contrast would significantly improve wall motion analysis  especially of the apical two chamber view. All the other segments appear to  contract normally. Regional wall motion abnormalities cannot be excluded due  to limited visualization.     Right Ventricle  The right ventricle is normal size. Right ventricular function cannot be  assessed due to poor image quality.     Atria  Normal left atrial size. Right atrial size is normal. There is no color  Doppler evidence of an atrial shunt.     Mitral Valve  There is trace mitral regurgitation.        Tricuspid Valve  There is trace tricuspid regurgitation. The right ventricular systolic  pressure is approximated at 13.1 mmHg plus the right atrial  pressure. Right  ventricular systolic pressure could be underestimated due to incomplete  tricuspid regurgitation velocity envelope.     Aortic Valve  Thickened aortic valve leaflets. The aortic valve is trileaflet. No aortic  regurgitation is present. No hemodynamically significant valvular aortic  stenosis.     Pulmonic Valve  There is trace pulmonic valvular regurgitation. Normal pulmonic valve  velocity.     Vessels  Normal size ascending aorta. There is a catheter in the IVC. The inferior vena  cava was normal in size with preserved respiratory variability.     Pericardium  There is no pericardial effusion.        Rhythm  Sinus rhythm was noted.  _____________________________________________________________________________  __  MMode/2D Measurements & Calculations     IVSd: 1.0 cm  LVIDd: 4.4 cm  LVIDs: 2.9 cm  LVPWd: 1.1 cm  FS: 33.0 %  LV mass(C)d: 160.0 grams  LV mass(C)dI: 85.0 grams/m2  LA dimension: 3.1 cm  asc Aorta Diam: 3.2 cm  LA Volume (BP): 33.3 ml  LA Volume Index (BP): 17.7 ml/m2  RWT: 0.52           Doppler Measurements & Calculations  MV E max francis: 80.0 cm/sec  MV A max francis: 41.7 cm/sec  MV E/A: 1.9  MV dec slope: 408.2 cm/sec2  PA acc time: 0.12 sec  TR max francis: 180.8 cm/sec  TR max P.1 mmHg  E/E' av.1  Lateral E/e': 8.2  Medial E/e': 10.1           _____________________________________________________________________________  __           Report approved by: Keith Goodrich 2020 04:23 PM      Cardiac Catheterization    Narrative    1.  Two-vessel coronary artery disease with successful PCI of proximal to   distal RCA with drug-eluting stents x3  2.  Residual disease noted in LAD with borderline circumflex disease  3.  LVEDP is 19 mmHg   Cardiac Catheterization    Narrative    1.  Stent thrombosis of mid RCA stents  2.  High-pressure balloon angioplasty with restoration of LETTY-3 flow  3.  IVUS performed demonstrated full apposition of stented segments

## 2020-03-09 ENCOUNTER — TELEPHONE (OUTPATIENT)
Dept: CARDIOLOGY | Facility: CLINIC | Age: 50
End: 2020-03-09

## 2020-03-09 DIAGNOSIS — I25.10 CAD (CORONARY ARTERY DISEASE): Primary | ICD-10-CM

## 2020-03-09 NOTE — TELEPHONE ENCOUNTER
Patient was evaluated by cardiology while inpatient for sudden onset chest pain, diaphoresis, cardiogenic shock and CHB-STEMI. 3/5/20: Emergent coronary angiogram via LFA showed two-vessel coronary artery disease with successful PCI of proximal to distal RCA with drug-eluting stents x3. Residual disease noted in LAD with borderline circumflex disease. Post reperfusion EKG normalized as well as blood pressures then he had acute mRCA stent reocclusion and recurrent complete heart block necessitating re-intervention via RFA and temp pacer placement. Started on Metoprolol, Lipitor, Brilinta. Pt will need OP staged LAD PCI and discuss management of cFX CAD. RN will confirm that pt was d/c with the antiplatelet Brilinta. Pt does not have an Rx for PRN SL Nitroglycerin. Will route this note to Dr. Salazar to address. RN will then call and confirm with patient that he needs to schedule cardiology OTONIEL f/u OV as ordered. Cardiac rehab is scheduled on 3/13/20. MALI Terrell RN.

## 2020-03-10 ENCOUNTER — APPOINTMENT (OUTPATIENT)
Dept: INTERPRETER SERVICES | Facility: CLINIC | Age: 50
End: 2020-03-10
Payer: COMMERCIAL

## 2020-03-10 LAB — INTERPRETATION ECG - MUSE: NORMAL

## 2020-03-10 RX ORDER — NITROGLYCERIN 0.4 MG/1
TABLET SUBLINGUAL
Qty: 30 TABLET | Refills: 1 | Status: SHIPPED | OUTPATIENT
Start: 2020-03-10 | End: 2021-12-01

## 2020-03-10 NOTE — TELEPHONE ENCOUNTER
Writer called pt using  services for the entire phone call. Pt denies any chest pain, SOB, fever or lightheadedness. Denies any medication questions and states has an adequate supply of Brilinta. Reminded pt of importance of taking Brilinta uninterrupted. Writer instructed pt on PRN SL Nitroglycerin, including indications, storage and expiration period once bottle opened, administration, expected side effects and when to call the clinic vs 911. Pt verbalized understanding and RX escribed to pt's [facility] pharmacy in [place] per his request. LFA access site is healing without signs of infection, swelling or drainage. Reviewed cardiac rehab date and informed pt of need to schedule f/u cardiology OV as ordered. Pt verbalized understanding of all instructions without further questions. Encouraged pt to call back if any questions arise and writer's and scheduling phone numbers provided. Call transferred to scheduling with  assistance. MALI Terrell RN.

## 2020-03-10 NOTE — TELEPHONE ENCOUNTER
Yes thank you    Routing comment       You  Charlee Salazar MD Yesterday (10:58 AM)       Dr. Salazar,   This pt was discharged to home without SL PRN NTG. Would you like this ordered? Please let me know.   Thanks,   Erin Terrell RN

## 2020-03-11 ENCOUNTER — OFFICE VISIT (OUTPATIENT)
Dept: FAMILY MEDICINE | Facility: CLINIC | Age: 50
End: 2020-03-11
Payer: COMMERCIAL

## 2020-03-11 VITALS
TEMPERATURE: 97.5 F | HEIGHT: 66 IN | HEART RATE: 61 BPM | WEIGHT: 180 LBS | BODY MASS INDEX: 28.93 KG/M2 | SYSTOLIC BLOOD PRESSURE: 90 MMHG | OXYGEN SATURATION: 100 % | DIASTOLIC BLOOD PRESSURE: 60 MMHG

## 2020-03-11 DIAGNOSIS — I25.10 CORONARY ARTERY DISEASE INVOLVING NATIVE CORONARY ARTERY OF NATIVE HEART WITHOUT ANGINA PECTORIS: ICD-10-CM

## 2020-03-11 DIAGNOSIS — I21.19 ACUTE ST ELEVATION MYOCARDIAL INFARCTION (STEMI) OF INFERIOR WALL (H): Primary | ICD-10-CM

## 2020-03-11 DIAGNOSIS — E78.5 HYPERLIPIDEMIA LDL GOAL <70: ICD-10-CM

## 2020-03-11 PROCEDURE — 99495 TRANSJ CARE MGMT MOD F2F 14D: CPT | Performed by: FAMILY MEDICINE

## 2020-03-11 RX ORDER — ATORVASTATIN CALCIUM 40 MG/1
40 TABLET, FILM COATED ORAL DAILY
Qty: 90 TABLET | Refills: 3 | Status: SHIPPED | OUTPATIENT
Start: 2020-03-11 | End: 2021-03-30

## 2020-03-11 RX ORDER — LISINOPRIL 10 MG/1
10 TABLET ORAL DAILY
Qty: 90 TABLET | Refills: 3 | Status: SHIPPED | OUTPATIENT
Start: 2020-03-11 | End: 2021-12-01 | Stop reason: DRUGHIGH

## 2020-03-11 RX ORDER — METOPROLOL TARTRATE 25 MG/1
25 TABLET, FILM COATED ORAL 2 TIMES DAILY
Qty: 180 TABLET | Refills: 3 | Status: SHIPPED | OUTPATIENT
Start: 2020-03-11 | End: 2020-11-16

## 2020-03-11 ASSESSMENT — MIFFLIN-ST. JEOR: SCORE: 1634.22

## 2020-03-11 NOTE — PROGRESS NOTES
Subjective     Candelario Stahl is a 49 year old male who presents to clinic today for the following health issues:    [facility]       Hospital Follow-up Visit:    Hospital/Nursing Home/IP Rehab Facility: [facility]  Date of Admission: 3/5  Date of Discharge: 3/8  Reason(s) for Admission: Complete heart block             Problems taking medications regularly:  None       Medication changes since discharge: baby aspirin, Lipitor, Lopressor, Brilinta         Problems adhering to non-medication therapy:  None    Summary of hospitalization:  [facility] discharge summary reviewed  Diagnostic Tests/Treatments reviewed.  Follow up needed: cardiolgy  Other Healthcare Providers Involved in Patient s Care:         cardiolgy  Update since discharge: stable.       Post Discharge Medication Reconciliation: discharge medications reconciled, continue medications without change.  Plan of care communicated with patient     Coding guidelines for this visit:  Type of Medical   Decision Making Face-to-Face Visit       within 7 Days of discharge Face-to-Face Visit        within 14 days of discharge   Moderate Complexity [id number]   High Complexity [id number]        Patient with no known medical history of heart but strong family history of heart disease admitted in the hospital due to acute symptoms of coronary disease including chest pain shortness of breath and sweating.  He was noted to have obstructive coronary artery disease.  Patient has history of smoking sedentary lifestyle and also family history of heart disease.  He has 3 stent placed and started on Brilinta along with lisinopril and metoprolol along with cholesterol medication.  He denies any new symptoms denies any chest pains.  Feels slight fatigue but otherwise all normal.            Reviewed and updated as needed this visit by Provider         Review of Systems   ROS COMP: Constitutional, HEENT, cardiovascular, pulmonary, gi and gu systems are  "negative, except as otherwise noted.      Objective    BP 90/60 (BP Location: Right arm, Patient Position: Chair, Cuff Size: Adult Regular)   Pulse 61   Temp 97.5  F (36.4  C) (Tympanic)   Ht 1.676 m (5' 6\")   Wt 81.6 kg (180 lb)   SpO2 100%   BMI 29.05 kg/m    Body mass index is 29.05 kg/m .  Physical Exam   GENERAL: healthy, alert and no distress  EYES: Eyes grossly normal to inspection, PERRL and conjunctivae and sclerae normal  HENT: ear canals and TM's normal, nose and mouth without ulcers or lesions  NECK: no adenopathy, no asymmetry, masses, or scars and thyroid normal to palpation  RESP: lungs clear to auscultation - no rales, rhonchi or wheezes  CV: regular rate and rhythm, normal S1 S2, no S3 or S4, no murmur, click or rub, no peripheral edema and peripheral pulses strong  ABDOMEN: soft, nontender, no hepatosplenomegaly, no masses and bowel sounds normal  MS: no gross musculoskeletal defects noted, no edema  SKIN: no suspicious lesions or rashes  NEURO: Normal strength and tone, mentation intact and speech normal  PSYCH: mentation appears normal, affect normal/bright    Diagnostic Test Results:  Labs reviewed in Epic        Assessment & Plan     1. Acute ST elevation myocardial infarction (STEMI) of inferior wall (H)  Discussed with the patient regarding his symptoms.  He may need to be on Brilinta for at least 1 year he will discuss that with his cardiology and refill from them.    2. Hyperlipidemia LDL goal <70  Wise to follow-up in 3 months we will check his labs and adjust medication as needed currently he is on a stable medication.  Denies any new symptoms.  Warning signs of chest pains were discussed with the patient.    3. Coronary artery disease involving native coronary artery of native heart without angina pectoris    - metoprolol tartrate (LOPRESSOR) 25 MG tablet; Take 1 tablet (25 mg) by mouth 2 times daily  Dispense: 180 tablet; Refill: 3  - lisinopril (ZESTRIL) 10 MG tablet; Take 1 " "tablet (10 mg) by mouth daily  Dispense: 90 tablet; Refill: 3  - atorvastatin (LIPITOR) 40 MG tablet; Take 1 tablet (40 mg) by mouth daily  Dispense: 90 tablet; Refill: 3  - aspirin (ASA) 81 MG EC tablet; Take 1 tablet (81 mg) by mouth daily  Dispense: 100 tablet; Refill: 1     BMI:   Estimated body mass index is 29.05 kg/m  as calculated from the following:    Height as of this encounter: 1.676 m (5' 6\").    Weight as of this encounter: 81.6 kg (180 lb).     Chemo García MD  [facility]      "

## 2020-03-13 ENCOUNTER — HOSPITAL ENCOUNTER (OUTPATIENT)
Dept: CARDIAC REHAB | Facility: CLINIC | Age: 50
End: 2020-03-13
Attending: INTERNAL MEDICINE
Payer: COMMERCIAL

## 2020-03-13 ENCOUNTER — DOCUMENTATION ONLY (OUTPATIENT)
Dept: CARDIOLOGY | Facility: CLINIC | Age: 50
End: 2020-03-13

## 2020-03-13 DIAGNOSIS — I25.10 CORONARY ARTERY DISEASE INVOLVING NATIVE CORONARY ARTERY OF NATIVE HEART WITHOUT ANGINA PECTORIS: ICD-10-CM

## 2020-03-13 DIAGNOSIS — I21.19 ACUTE ST ELEVATION MYOCARDIAL INFARCTION (STEMI) OF INFERIOR WALL (H): Primary | ICD-10-CM

## 2020-03-13 NOTE — PROGRESS NOTES
Message from Dr. Paz: patient does not need to be seen on 3/17/2020 - change orders to a stress echo and OTONIEL visit in 3 weeks.

## 2020-03-16 ENCOUNTER — TELEPHONE (OUTPATIENT)
Dept: CARDIOLOGY | Facility: CLINIC | Age: 50
End: 2020-03-16

## 2020-03-16 DIAGNOSIS — I25.10 CORONARY ARTERY DISEASE INVOLVING NATIVE HEART WITHOUT ANGINA PECTORIS, UNSPECIFIED VESSEL OR LESION TYPE: Primary | ICD-10-CM

## 2020-03-16 NOTE — TELEPHONE ENCOUNTER
Spoke with Patient regarding upcoming OV 3- with Dr. Paz. Recommendation is if Patient agreeable to offer a stress echo and OTONIEL OV in 3 weeks for follow up.    Patient agrees with plan. States is feeling well.  WELLNESS SCREENING TOOL    Do you have a:     Fever? no     Cough? no     Shortness of breath?  no (if yes, is this a change or new?)     Skin rash?  no    Within the past 14 days, have you been in contact with someone who:     Is currently being ruled out for COVID-19 (novel coronavirus)? - no     Has tested positive for COVID-19?     Has symptoms of a respiratory illness (fever, cough, shortness of breath)? - no    Have you or have you been in contact with anyone who has recently traveled to an area with COVID-19? -  no      Within the past 3 weeks, have you been exposed to the following:       Pertussis?  no     Chicken pox?  no     Measles?   no       Appointment will be rescheduled to a later date and a stress echo ordered.

## 2020-03-16 NOTE — TELEPHONE ENCOUNTER
----- Message from Ria Flores RN sent at 3/13/2020  4:30 PM CDT -----  Regarding: please see note  Message from Dr. Paz: patient does not need to be seen on 3/17/2020 - change orders to a stress echo and OTONIEL visit in 3 weeks.  Thanks  shital

## 2020-03-31 ENCOUNTER — VIRTUAL VISIT (OUTPATIENT)
Dept: CARDIOLOGY | Facility: CLINIC | Age: 50
End: 2020-03-31
Attending: INTERNAL MEDICINE
Payer: COMMERCIAL

## 2020-03-31 VITALS
WEIGHT: 170 LBS | HEIGHT: 66 IN | HEART RATE: 62 BPM | BODY MASS INDEX: 27.32 KG/M2 | SYSTOLIC BLOOD PRESSURE: 120 MMHG | DIASTOLIC BLOOD PRESSURE: 70 MMHG

## 2020-03-31 DIAGNOSIS — I21.19 ACUTE ST ELEVATION MYOCARDIAL INFARCTION (STEMI) OF INFERIOR WALL (H): Primary | ICD-10-CM

## 2020-03-31 DIAGNOSIS — I10 ESSENTIAL HYPERTENSION: ICD-10-CM

## 2020-03-31 DIAGNOSIS — I44.2 COMPLETE HEART BLOCK (H): ICD-10-CM

## 2020-03-31 DIAGNOSIS — Z72.0 TOBACCO ABUSE: ICD-10-CM

## 2020-03-31 DIAGNOSIS — E78.5 HYPERLIPIDEMIA LDL GOAL <70: ICD-10-CM

## 2020-03-31 DIAGNOSIS — I25.10 CORONARY ARTERY DISEASE INVOLVING NATIVE HEART WITHOUT ANGINA PECTORIS, UNSPECIFIED VESSEL OR LESION TYPE: ICD-10-CM

## 2020-03-31 PROCEDURE — 99214 OFFICE O/P EST MOD 30 MIN: CPT | Mod: TEL | Performed by: NURSE PRACTITIONER

## 2020-03-31 ASSESSMENT — MIFFLIN-ST. JEOR: SCORE: 1588.86

## 2020-03-31 NOTE — PROGRESS NOTES
"CARDIOLOGY TELEPHONE VISIT    Candelario Stahl is a 49 year old male who is being evaluated via a billable telephone visit.      The patient has been notified of following:     \"This telephone visit will be conducted via a call between you and your physician/provider. Given concern for spread of COVID 19 we are minimizing in person clinic visits when possible. We have found that certain health care needs can be provided without the need for a physical exam.  This service lets us provide the care you need with a short phone conversation.  If a prescription is necessary we can send it directly to your pharmacy.  If lab work is needed we can place an order for that and you can then stop by our lab to have the test done at a later time. If during the course of the call the physician/provider feels a telephone visit is not appropriate, you will not be charged for this service.\"       I have reviewed and updated the patient's Past Medical History, Social History, Family History and Medication List.    MEDICATIONS:  Current Outpatient Medications   Medication Sig Dispense Refill     aspirin (ASA) 81 MG EC tablet Take 1 tablet (81 mg) by mouth daily 100 tablet 1     atorvastatin (LIPITOR) 40 MG tablet Take 1 tablet (40 mg) by mouth daily 90 tablet 3     cetirizine (ZYRTEC) 10 MG tablet Take 10 mg by mouth daily       lisinopril (ZESTRIL) 10 MG tablet Take 1 tablet (10 mg) by mouth daily 90 tablet 3     metoprolol tartrate (LOPRESSOR) 25 MG tablet Take 1 tablet (25 mg) by mouth 2 times daily 180 tablet 3     multivitamin, therapeutic (THERA-VIT) TABS tablet Take 1 tablet by mouth Takes when remembers, usually once weekly.       nitroGLYcerin (NITROSTAT) 0.4 MG sublingual tablet For chest pain place 1 tablet under the tongue every 5 minutes for 3 doses. If symptoms persist 5 minutes after 2 nd dose call 911. 30 tablet 1     ticagrelor (BRILINTA) 90 MG tablet Take 1 tablet (90 mg) by mouth every 12 hours 60 tablet 10 "       ALLERGIES  Patient has no known allergies.     BP:120/70  Pulse:62  Wt:170lb     Jaclyn Ellison A      Review Of Systems  Skin: negative  Eyes: negative  Ears/Nose/Throat: negative  Respiratory: Dyspnea on exertion-   Cardiovascular: exertional chest pain or pressure  Gastrointestinal: negative  Genitourinary: negative  Musculoskeletal: fatigue  Neurologic: negative  Psychiatric: negative  Hematologic/Lymphatic/Immunologic: negative  Endocrine: negative      HPI:    Candelario Stahl is a 49-year-old man from [place] with a past medical history significant for hypertension, tobacco abuse, and family history of coronary artery disease.    On 3/5/2020, he presented to the emergency room with an acute inferior ST elevation MI and third-degree heart block.  Troponin peaked at 37.115.  He was initiated on transcutaneous pacing and taken emergently to the Cath Lab where a temporary pacemaker was placed.  Coronary angiogram showed a 100% RCA occlusion followed by 3 drug-eluting stents, 2.5 x 30 mm to the mid RCA, 2.5 x 38 mm in the proximal segment and additional 3.0 x 12 mm stent to the proximal RCA.  There is noted to residual 70% stenosis in the proximal to mid LAD, and 60% distal circumflex and 60% OM 3.  Unfortunately, he continued to have recurrent chest pain with inferior changes noted on EKG and was subsequently brought back to the Cath Lab where he was found to have stent thrombosis of the mid RCA stent.  He subsequently underwent high-pressure balloon angioplasty with restoration of LETTY-3 flow.  Temporary pacemaker was kept in overnight and removed the following morning after showing no further CHB.  Follow-up echocardiogram demonstrates an ejection fraction of 60 to 65%, possible severe hypokinesis of the basal inferior wall, normal RV size, and no significant valvular disease.  He was discharged on Brilinta, aspirin, metoprolol, lisinopril, and atorvastatin.     Today, he reports fatigue,  occasional lightheadedness and dull chest ache, unchanged from hospitalization.  He states when he does any exertional type activity he will feel a dull chest ache that quickly goes away with rest.  He has not felt the need to have to use sublingual nitroglycerin.  He denies shortness of breath, palpitations, PND, orthopnea, presyncope, syncope, or lower extremity edema.    Blood pressure is well controlled at 120/70, managed on metoprolol and lisinopril.  Last BMP showed a sodium of 139, potassium 4.5, BUN 14, creatinine 0.89, GFR greater than 90.  Hemoglobin 14.0 and platelet 244, he denies any evidence of bleeding on dual antiplatelet therapy.  Lipid panel showed a total cholesterol of 190, HDL 35, , and triglycerides 43    He is currently enrolled in cardiac rehab  Eating and sleeping well  He continues to smoke 1-2 cigarettes/day    Compliant with all medications.       Assessment/Plan:    1. CAD/ STEMI - Coronary angiogram showed a 100% RCA occlusion followed by 3 drug-eluting stents, 2.5 x 30 mm to the mid RCA, 2.5 x 38 mm in the proximal segment and additional 3.0 x 12 mm stent to the proximal RCA.  There is noted to residual 70% stenosis in the proximal to mid LAD, and 60% distal circumflex and 60% OM 3. Repeat coronary angiogram followed by POBA to mid RCA stent. Continues with exertional chest wall pain quickly relieved with rest. Has PRN SL nitroglycerin. Continue on DAPT uninterrupted x 12 months, metoprolol, lisinopril, and statin. Patient is recommended for stress echo to assess ischemia in the LAD territory.  If ischemia is significant, will set up for staged PCI.  Advised patient to monitor symptoms. Should symptoms worsen contact office or seek ER evaluation if symptoms become severe.  Continue with cardiac rehab.    2.  Complete heart block -remained in normal sinus rhythm at the time of discharge.    3.  Hypertension-well-controlled on current medical therapy  4.  Hyperlipidemia-LDL not at  goal, initiated on atorvastatin.  Follow-up fasting lipid panel in 8 weeks.  5.  Tobacco abuse -currently using 1 to 2 cigarettes/day.  Encourage smoking cessation.        Follow up plan: Follow up stress echo in the next 3-4 weeks. Further recommendations following results of stress test.     I have reviewed the note as documented above.  This accurately captures the substance of my conversation with the patient.      Phone call contact time  Call Started at [phone]  Call Ended at [phone]    LUIS Kaur, CNP  Pager [phone]  3/31/2020

## 2020-03-31 NOTE — PROGRESS NOTES
"Candelario Stahl is a 49 year old male who is being evaluated via a billable telephone visit.      The patient has been notified of following:     \"This telephone visit will be conducted via a call between you and your physician/provider. We have found that certain health care needs can be provided without the need for a physical exam.  This service lets us provide the care you need with a short phone conversation.  If a prescription is necessary we can send it directly to your pharmacy.  If lab work is needed we can place an order for that and you can then stop by our lab to have the test done at a later time.    If during the course of the call the physician/provider feels a telephone visit is not appropriate, you will not be charged for this service.\"     Patient has given verbal consent for Telephone visit? Yes    Candelario Stahl complains of    Chief Complaint   Patient presents with     Coronary Artery Disease       I have reviewed and updated the patient's Past Medical History, Social History, Family History and Medication List.    ALLERGIES  Patient has no known allergies.    BP:120/70  Pulse:62  Wt:170lb    Jaclyn GANT        "

## 2020-03-31 NOTE — PATIENT INSTRUCTIONS
Thanks for participating in a telephone visit with the [facility] clinic today.    Dull chest wall pain, unchanged from discharge.   Plan for stress echo to evaluate ischemia ( blockage) in the LAD/ Circumflex territory.   Monitor for worsening chest pain, If pain becomes severe use SL nitroglycerin and seek ER evaluation.   Continue on current medications  Continue with cardiac rehab  Stop smoking  More recommendations following results of stress test.     Please call my nurse at [phone] with any questions or concerns.    Scheduling phone number: [phone]  Reminder: Please bring in all current medications, over the counter supplements and vitamin bottles to your next appointment.

## 2020-04-02 ENCOUNTER — DOCUMENTATION ONLY (OUTPATIENT)
Dept: CARDIOLOGY | Facility: CLINIC | Age: 50
End: 2020-04-02

## 2020-04-02 DIAGNOSIS — I25.119 CORONARY ARTERY DISEASE INVOLVING NATIVE CORONARY ARTERY OF NATIVE HEART WITH ANGINA PECTORIS (H): Primary | ICD-10-CM

## 2020-04-02 NOTE — PROGRESS NOTES
Patient is a recent STEMI s/p PCI with plans for staged PCI to the LAD. He recently had a telephone visit and reported limited activity due to chest pain with exertion. Discussed with Dr. Mata patients ongoing symptoms and recommend staged PCI be scheduled for next week. Risks and benefits of coronary angiogram discussed today including, bleeding, bruising, infection, allergic reaction, kidney damage (including need for dialysis), stroke, heart attack, vascular damage, emergency open heart surgery, up to and including death.  Patient indicates understanding and is agreeable to proceed.  Patient has been instructed to use SL nitroglycerin for severe pain and seek ER evaluation. He has been instructed to be NPO after midnight the evening before the procedure. Hold lisinopril the day of procedure. Continue with all other medications. Orders have been placed.

## 2020-04-06 ENCOUNTER — DOCUMENTATION ONLY (OUTPATIENT)
Dept: CARDIOLOGY | Facility: CLINIC | Age: 50
End: 2020-04-06

## 2020-04-06 ENCOUNTER — TELEPHONE (OUTPATIENT)
Dept: CARDIOLOGY | Facility: CLINIC | Age: 50
End: 2020-04-06

## 2020-04-06 DIAGNOSIS — I25.119 CORONARY ARTERY DISEASE INVOLVING NATIVE CORONARY ARTERY OF NATIVE HEART WITH ANGINA PECTORIS (H): Primary | ICD-10-CM

## 2020-04-06 RX ORDER — LIDOCAINE 40 MG/G
CREAM TOPICAL
Status: CANCELLED | OUTPATIENT
Start: 2020-04-06

## 2020-04-06 RX ORDER — POTASSIUM CHLORIDE 1500 MG/1
20 TABLET, EXTENDED RELEASE ORAL
Status: CANCELLED | OUTPATIENT
Start: 2020-04-06

## 2020-04-06 RX ORDER — ASPIRIN 81 MG/1
81 TABLET ORAL DAILY
Status: CANCELLED | OUTPATIENT
Start: 2020-04-06 | End: 2020-04-08

## 2020-04-06 RX ORDER — SODIUM CHLORIDE 9 MG/ML
INJECTION, SOLUTION INTRAVENOUS CONTINUOUS
Status: CANCELLED | OUTPATIENT
Start: 2020-04-06

## 2020-04-06 NOTE — TELEPHONE ENCOUNTER
Attempted to contact the patient with the  to review pre-angiogram procedures.  left a message for patient to call back to team 2 RN phone.    1600 patient called back, he states he is comfortable reviewing his pre-angiogram planning without the .    Contacted patient to review pre-cath procedures: patient is scheduled for coronary angiogram (left)  on 4/9/2020 . Patient's arrival time is 0630 and procedure time is 0830. Patient is aware to be NPO except for medications. Patient will hold the medication lisinopril the day of procedure . Patient has arranged for transportation and 24 hour f/u care. Patient has no known contract dye allergy. Patient is not diabetic. Patient is not taking anti-coagulation medication. Patient's renal function is WNL for procedure. Patient will continue daily aspirin dose 81. Order for procedure entered.    WELLNESS SCREENING TOOL  Symptom screening    Do you have a:     Fever or reported chills? no      A new cough (started within the past 14 days)? no      Shortness of breath (started within the past 14 days) no      Nausea, vomiting or diarrhea?  no      Muscle aches or fatigue (started within the past 14 days) no    Within the past 3 weeks, have you been exposed to someone with a known positive illness below? no       COVID - 19 (known or suspected) no     Pertussis? no      Chicken pox? no      Measles? no     Patient notified of visitor restriction: yes    Patient's appointment status: Patient will be seen in clinic as scheduled on 4/9/2020    Travel screening updated

## 2020-04-09 ENCOUNTER — HOSPITAL ENCOUNTER (OUTPATIENT)
Facility: CLINIC | Age: 50
Discharge: HOME | End: 2020-04-09
Admitting: INTERNAL MEDICINE
Payer: COMMERCIAL

## 2020-04-09 ENCOUNTER — SURGERY (OUTPATIENT)
Age: 50
End: 2020-04-09
Payer: COMMERCIAL

## 2020-04-09 VITALS
SYSTOLIC BLOOD PRESSURE: 128 MMHG | OXYGEN SATURATION: 96 % | WEIGHT: 188.7 LBS | BODY MASS INDEX: 28.6 KG/M2 | RESPIRATION RATE: 16 BRPM | DIASTOLIC BLOOD PRESSURE: 75 MMHG | HEIGHT: 68 IN | HEART RATE: 73 BPM | TEMPERATURE: 97.9 F

## 2020-04-09 DIAGNOSIS — I25.119 CORONARY ARTERY DISEASE INVOLVING NATIVE CORONARY ARTERY OF NATIVE HEART WITH ANGINA PECTORIS (H): ICD-10-CM

## 2020-04-09 PROBLEM — Z98.890 STATUS POST CORONARY ANGIOGRAM: Status: ACTIVE | Noted: 2020-04-09

## 2020-04-09 LAB
ANION GAP SERPL CALCULATED.3IONS-SCNC: 5 MMOL/L (ref 3–14)
APTT PPP: 34 SEC (ref 22–37)
BUN SERPL-MCNC: 17 MG/DL (ref 7–30)
CALCIUM SERPL-MCNC: 8.3 MG/DL (ref 8.5–10.1)
CHLORIDE SERPL-SCNC: 112 MMOL/L (ref 94–109)
CO2 SERPL-SCNC: 23 MMOL/L (ref 20–32)
CREAT SERPL-MCNC: 0.85 MG/DL (ref 0.66–1.25)
ERYTHROCYTE [DISTWIDTH] IN BLOOD BY AUTOMATED COUNT: 13.2 % (ref 10–15)
GFR SERPL CREATININE-BSD FRML MDRD: >90 ML/MIN/{1.73_M2}
GLUCOSE SERPL-MCNC: 97 MG/DL (ref 70–99)
HCT VFR BLD AUTO: 43.7 % (ref 40–53)
HGB BLD-MCNC: 14.3 G/DL (ref 13.3–17.7)
INR PPP: 1.02 (ref 0.86–1.14)
KCT BLD-ACNC: 304 SEC (ref 75–150)
KCT BLD-ACNC: 348 SEC (ref 75–150)
KCT BLD-ACNC: 348 SEC (ref 75–150)
KCT BLD-ACNC: 364 SEC (ref 75–150)
KCT BLD-ACNC: 409 SEC (ref 75–150)
KCT BLD-ACNC: 457 SEC (ref 75–150)
MCH RBC QN AUTO: 27.7 PG (ref 26.5–33)
MCHC RBC AUTO-ENTMCNC: 32.7 G/DL (ref 31.5–36.5)
MCV RBC AUTO: 85 FL (ref 78–100)
PLATELET # BLD AUTO: 245 10E9/L (ref 150–450)
POTASSIUM SERPL-SCNC: 4.2 MMOL/L (ref 3.4–5.3)
RBC # BLD AUTO: 5.17 10E12/L (ref 4.4–5.9)
SODIUM SERPL-SCNC: 140 MMOL/L (ref 133–144)
WBC # BLD AUTO: 8.7 10E9/L (ref 4–11)

## 2020-04-09 PROCEDURE — 93458 L HRT ARTERY/VENTRICLE ANGIO: CPT | Mod: 26 | Performed by: INTERNAL MEDICINE

## 2020-04-09 PROCEDURE — 25800030 ZZH RX IP 258 OP 636: Performed by: INTERNAL MEDICINE

## 2020-04-09 PROCEDURE — C1874 STENT, COATED/COV W/DEL SYS: HCPCS | Performed by: INTERNAL MEDICINE

## 2020-04-09 PROCEDURE — 93005 ELECTROCARDIOGRAM TRACING: CPT

## 2020-04-09 PROCEDURE — C9601 PERC DRUG-EL COR STENT BRAN: HCPCS | Performed by: INTERNAL MEDICINE

## 2020-04-09 PROCEDURE — 92929 ZZHC PRQ TRLUML CORONARY BM STENT W/ANGIO ADDL ART/BRNCH: CPT | Mod: 59 | Performed by: INTERNAL MEDICINE

## 2020-04-09 PROCEDURE — C1887 CATHETER, GUIDING: HCPCS | Performed by: INTERNAL MEDICINE

## 2020-04-09 PROCEDURE — 92933 PRQ TRLML C ATHRC ST ANGIOP1: CPT | Mod: LD | Performed by: INTERNAL MEDICINE

## 2020-04-09 PROCEDURE — C9602 PERC D-E COR STENT ATHER S: HCPCS | Performed by: INTERNAL MEDICINE

## 2020-04-09 PROCEDURE — 92924 PRQ TRLUML C ATHRC 1 ART&/BR: CPT | Performed by: INTERNAL MEDICINE

## 2020-04-09 PROCEDURE — 40000852 ZZH STATISTIC HEART CATH LAB OR EP LAB

## 2020-04-09 PROCEDURE — 99153 MOD SED SAME PHYS/QHP EA: CPT | Performed by: INTERNAL MEDICINE

## 2020-04-09 PROCEDURE — C1769 GUIDE WIRE: HCPCS | Performed by: INTERNAL MEDICINE

## 2020-04-09 PROCEDURE — 85027 COMPLETE CBC AUTOMATED: CPT

## 2020-04-09 PROCEDURE — 40000065 ZZH STATISTIC EKG NON-CHARGEABLE

## 2020-04-09 PROCEDURE — 25000125 ZZHC RX 250: Performed by: INTERNAL MEDICINE

## 2020-04-09 PROCEDURE — 25000128 H RX IP 250 OP 636: Performed by: INTERNAL MEDICINE

## 2020-04-09 PROCEDURE — C1724 CATH, TRANS ATHEREC,ROTATION: HCPCS | Performed by: INTERNAL MEDICINE

## 2020-04-09 PROCEDURE — C1894 INTRO/SHEATH, NON-LASER: HCPCS | Performed by: INTERNAL MEDICINE

## 2020-04-09 PROCEDURE — 92979 ENDOLUMINL IVUS OCT C EA: CPT | Performed by: INTERNAL MEDICINE

## 2020-04-09 PROCEDURE — 93458 L HRT ARTERY/VENTRICLE ANGIO: CPT | Mod: XU | Performed by: INTERNAL MEDICINE

## 2020-04-09 PROCEDURE — C1725 CATH, TRANSLUMIN NON-LASER: HCPCS | Performed by: INTERNAL MEDICINE

## 2020-04-09 PROCEDURE — 85730 THROMBOPLASTIN TIME PARTIAL: CPT

## 2020-04-09 PROCEDURE — 99152 MOD SED SAME PHYS/QHP 5/>YRS: CPT | Mod: 59 | Performed by: INTERNAL MEDICINE

## 2020-04-09 PROCEDURE — 92978 ENDOLUMINL IVUS OCT C 1ST: CPT | Mod: 26 | Performed by: INTERNAL MEDICINE

## 2020-04-09 PROCEDURE — 85347 COAGULATION TIME ACTIVATED: CPT | Mod: 91

## 2020-04-09 PROCEDURE — 93010 ELECTROCARDIOGRAM REPORT: CPT | Mod: 76 | Performed by: INTERNAL MEDICINE

## 2020-04-09 PROCEDURE — 85610 PROTHROMBIN TIME: CPT

## 2020-04-09 PROCEDURE — C9600 PERC DRUG-EL COR STENT SING: HCPCS | Performed by: INTERNAL MEDICINE

## 2020-04-09 PROCEDURE — 92978 ENDOLUMINL IVUS OCT C 1ST: CPT | Performed by: INTERNAL MEDICINE

## 2020-04-09 PROCEDURE — C1753 CATH, INTRAVAS ULTRASOUND: HCPCS | Performed by: INTERNAL MEDICINE

## 2020-04-09 PROCEDURE — 80048 BASIC METABOLIC PNL TOTAL CA: CPT

## 2020-04-09 PROCEDURE — 99152 MOD SED SAME PHYS/QHP 5/>YRS: CPT | Performed by: INTERNAL MEDICINE

## 2020-04-09 PROCEDURE — 27210794 ZZH OR GENERAL SUPPLY STERILE: Performed by: INTERNAL MEDICINE

## 2020-04-09 DEVICE — STENT RESOLUTE ONYX DE 2.7FR 3.50X30MM RONYX DE35030UX: Type: IMPLANTABLE DEVICE | Status: FUNCTIONAL

## 2020-04-09 DEVICE — STENT RESOLUTE ONYX DE 2.7FR 2.00X12MM RONYX DE20012UX: Type: IMPLANTABLE DEVICE | Status: FUNCTIONAL

## 2020-04-09 DEVICE — STENT RESOLUTE ONYX DE 2.7FR 2.75X22MM RONYX DE27522UX: Type: IMPLANTABLE DEVICE | Status: FUNCTIONAL

## 2020-04-09 RX ORDER — NITROGLYCERIN 0.4 MG/1
0.4 TABLET SUBLINGUAL EVERY 5 MIN PRN
Status: DISCONTINUED | OUTPATIENT
Start: 2020-04-09 | End: 2020-04-09 | Stop reason: HOSPADM

## 2020-04-09 RX ORDER — NALOXONE HYDROCHLORIDE 0.4 MG/ML
.1-.4 INJECTION, SOLUTION INTRAMUSCULAR; INTRAVENOUS; SUBCUTANEOUS
Status: DISCONTINUED | OUTPATIENT
Start: 2020-04-09 | End: 2020-04-09 | Stop reason: HOSPADM

## 2020-04-09 RX ORDER — SODIUM CHLORIDE 9 MG/ML
INJECTION, SOLUTION INTRAVENOUS CONTINUOUS
Status: DISCONTINUED | OUTPATIENT
Start: 2020-04-09 | End: 2020-04-09 | Stop reason: HOSPADM

## 2020-04-09 RX ORDER — ASPIRIN 81 MG/1
81 TABLET ORAL DAILY
Status: DISCONTINUED | OUTPATIENT
Start: 2020-04-09 | End: 2020-04-09 | Stop reason: HOSPADM

## 2020-04-09 RX ORDER — NITROGLYCERIN 5 MG/ML
VIAL (ML) INTRAVENOUS
Status: DISCONTINUED | OUTPATIENT
Start: 2020-04-09 | End: 2020-04-09 | Stop reason: HOSPADM

## 2020-04-09 RX ORDER — NALOXONE HYDROCHLORIDE 0.4 MG/ML
.2-.4 INJECTION, SOLUTION INTRAMUSCULAR; INTRAVENOUS; SUBCUTANEOUS
Status: DISCONTINUED | OUTPATIENT
Start: 2020-04-09 | End: 2020-04-09 | Stop reason: HOSPADM

## 2020-04-09 RX ORDER — LIDOCAINE 40 MG/G
CREAM TOPICAL
Status: DISCONTINUED | OUTPATIENT
Start: 2020-04-09 | End: 2020-04-09 | Stop reason: HOSPADM

## 2020-04-09 RX ORDER — FENTANYL CITRATE 50 UG/ML
25-50 INJECTION, SOLUTION INTRAMUSCULAR; INTRAVENOUS
Status: DISCONTINUED | OUTPATIENT
Start: 2020-04-09 | End: 2020-04-09 | Stop reason: HOSPADM

## 2020-04-09 RX ORDER — POTASSIUM CHLORIDE 1500 MG/1
20 TABLET, EXTENDED RELEASE ORAL
Status: DISCONTINUED | OUTPATIENT
Start: 2020-04-09 | End: 2020-04-09 | Stop reason: HOSPADM

## 2020-04-09 RX ORDER — ATROPINE SULFATE 0.1 MG/ML
0.5 INJECTION INTRAVENOUS EVERY 5 MIN PRN
Status: DISCONTINUED | OUTPATIENT
Start: 2020-04-09 | End: 2020-04-09 | Stop reason: HOSPADM

## 2020-04-09 RX ORDER — FLUMAZENIL 0.1 MG/ML
0.2 INJECTION, SOLUTION INTRAVENOUS
Status: DISCONTINUED | OUTPATIENT
Start: 2020-04-09 | End: 2020-04-09 | Stop reason: HOSPADM

## 2020-04-09 RX ORDER — IOPAMIDOL 755 MG/ML
INJECTION, SOLUTION INTRAVASCULAR
Status: DISCONTINUED | OUTPATIENT
Start: 2020-04-09 | End: 2020-04-09 | Stop reason: HOSPADM

## 2020-04-09 RX ORDER — FENTANYL CITRATE 50 UG/ML
INJECTION, SOLUTION INTRAMUSCULAR; INTRAVENOUS
Status: DISCONTINUED | OUTPATIENT
Start: 2020-04-09 | End: 2020-04-09 | Stop reason: HOSPADM

## 2020-04-09 RX ORDER — ACETAMINOPHEN 325 MG/1
650 TABLET ORAL EVERY 4 HOURS PRN
Status: DISCONTINUED | OUTPATIENT
Start: 2020-04-09 | End: 2020-04-09 | Stop reason: HOSPADM

## 2020-04-09 RX ORDER — VERAPAMIL HYDROCHLORIDE 2.5 MG/ML
INJECTION, SOLUTION INTRAVENOUS
Status: DISCONTINUED | OUTPATIENT
Start: 2020-04-09 | End: 2020-04-09 | Stop reason: HOSPADM

## 2020-04-09 RX ORDER — HEPARIN SODIUM 1000 [USP'U]/ML
INJECTION, SOLUTION INTRAVENOUS; SUBCUTANEOUS
Status: DISCONTINUED | OUTPATIENT
Start: 2020-04-09 | End: 2020-04-09 | Stop reason: HOSPADM

## 2020-04-09 RX ADMIN — IOPAMIDOL 200 ML: 755 INJECTION, SOLUTION INTRAVENOUS at 12:04

## 2020-04-09 RX ADMIN — VERAPAMIL HYDROCHLORIDE 2.5 MG: 2.5 INJECTION, SOLUTION INTRAVENOUS at 08:58

## 2020-04-09 RX ADMIN — MIDAZOLAM 0.5 MG: 1 INJECTION INTRAMUSCULAR; INTRAVENOUS at 09:51

## 2020-04-09 RX ADMIN — MIDAZOLAM 0.5 MG: 1 INJECTION INTRAMUSCULAR; INTRAVENOUS at 10:07

## 2020-04-09 RX ADMIN — MIDAZOLAM 0.5 MG: 1 INJECTION INTRAMUSCULAR; INTRAVENOUS at 09:31

## 2020-04-09 RX ADMIN — NITROGLYCERIN 500 MCG: 5 INJECTION, SOLUTION INTRAVENOUS at 11:53

## 2020-04-09 RX ADMIN — HEPARIN SODIUM 2000 UNITS: 1000 INJECTION INTRAVENOUS; SUBCUTANEOUS at 10:06

## 2020-04-09 RX ADMIN — SODIUM CHLORIDE: 9 INJECTION, SOLUTION INTRAVENOUS at 07:22

## 2020-04-09 RX ADMIN — MIDAZOLAM 0.5 MG: 1 INJECTION INTRAMUSCULAR; INTRAVENOUS at 09:43

## 2020-04-09 RX ADMIN — HEPARIN SODIUM 9000 UNITS: 1000 INJECTION INTRAVENOUS; SUBCUTANEOUS at 09:00

## 2020-04-09 RX ADMIN — FENTANYL CITRATE 50 MCG: 50 INJECTION, SOLUTION INTRAMUSCULAR; INTRAVENOUS at 11:29

## 2020-04-09 RX ADMIN — MIDAZOLAM 1 MG: 1 INJECTION INTRAMUSCULAR; INTRAVENOUS at 08:50

## 2020-04-09 RX ADMIN — NITROGLYCERIN 200 MCG: 5 INJECTION, SOLUTION INTRAVENOUS at 08:58

## 2020-04-09 RX ADMIN — FENTANYL CITRATE 25 MCG: 50 INJECTION, SOLUTION INTRAMUSCULAR; INTRAVENOUS at 09:08

## 2020-04-09 RX ADMIN — MIDAZOLAM 0.5 MG: 1 INJECTION INTRAMUSCULAR; INTRAVENOUS at 09:08

## 2020-04-09 RX ADMIN — MIDAZOLAM 0.5 MG: 1 INJECTION INTRAMUSCULAR; INTRAVENOUS at 10:40

## 2020-04-09 RX ADMIN — FENTANYL CITRATE 25 MCG: 50 INJECTION, SOLUTION INTRAMUSCULAR; INTRAVENOUS at 09:43

## 2020-04-09 RX ADMIN — HEPARIN SODIUM 3000 UNITS: 1000 INJECTION INTRAVENOUS; SUBCUTANEOUS at 10:45

## 2020-04-09 RX ADMIN — FENTANYL CITRATE 25 MCG: 50 INJECTION, SOLUTION INTRAMUSCULAR; INTRAVENOUS at 10:06

## 2020-04-09 RX ADMIN — FENTANYL CITRATE 25 MCG: 50 INJECTION, SOLUTION INTRAMUSCULAR; INTRAVENOUS at 09:31

## 2020-04-09 RX ADMIN — FENTANYL CITRATE 50 MCG: 50 INJECTION, SOLUTION INTRAMUSCULAR; INTRAVENOUS at 11:49

## 2020-04-09 RX ADMIN — LIDOCAINE HYDROCHLORIDE 1 ML: 10 INJECTION, SOLUTION EPIDURAL; INFILTRATION; INTRACAUDAL; PERINEURAL at 08:56

## 2020-04-09 RX ADMIN — NITROGLYCERIN 400 MCG: 5 INJECTION, SOLUTION INTRAVENOUS at 09:10

## 2020-04-09 RX ADMIN — FENTANYL CITRATE 25 MCG: 50 INJECTION, SOLUTION INTRAMUSCULAR; INTRAVENOUS at 09:51

## 2020-04-09 RX ADMIN — FENTANYL CITRATE 50 MCG: 50 INJECTION, SOLUTION INTRAMUSCULAR; INTRAVENOUS at 08:50

## 2020-04-09 RX ADMIN — FENTANYL CITRATE 25 MCG: 50 INJECTION, SOLUTION INTRAMUSCULAR; INTRAVENOUS at 10:40

## 2020-04-09 ASSESSMENT — MIFFLIN-ST. JEOR: SCORE: 1705.44

## 2020-04-09 NOTE — PRE-PROCEDURE
GENERAL PRE-PROCEDURE:   Procedure:  Heart catheterization  Date/Time:  4/9/2020 8:45 AM    Written consent obtained?: Yes    Risks and benefits: Risks, benefits and alternatives were discussed    Consent given by:  Patient  Patient states understanding of procedure being performed: Yes    Patient's understanding of procedure matches consent: Yes    Procedure consent matches procedure scheduled: Yes    Expected level of sedation:  Moderate  Appropriately NPO:  Yes  ASA Class:  Class 3- Severe systemic disease, definite functional limitations  Mallampati  :  Grade 2- soft palate, base of uvula, tonsillar pillars, and portion of posterior pharyngeal wall visible  Lungs:  Lungs clear with good breath sounds bilaterally  Heart:  Normal heart sounds and rate  History & Physical reviewed:  History and physical reviewed and no updates needed  Statement of review:  I have reviewed the lab findings, diagnostic data, medications, and the plan for sedation

## 2020-04-09 NOTE — PROGRESS NOTES
Care Suites Post Procedure Note    Patient Information  Name: Candelario Stahl  Age: 49 year old    Post Procedure  Procedure: Left heart cath  Time patient returned to Care Suites: 1215  Concerns/abnormal assessment: no immediated  If abnormal assessment, provider notified: No  Plan/Other: Continue post procedure plan of care    Report received from Shae DAVID to continue monitoring pt.    Right radial site stable, site CDI. No hematoma. Pulse is present.  Taking po fluids well, has voided already.  Denies any pain or discomfort.  Discharge instruction is given to pt.  All questions are answered.     Matilda Rosa RN     Care Suites Discharge Nursing Note    Patient Information  Name: Candelario Stahl  Age: 49 year old    Discharge Education:  Discharge instructions reviewed:  Yes  Additional education/resources provided: NA  Patient/patient representative verbalizes understanding: yes  Patient discharging on new medications:  No  Medication education completed:  NA    Discharge Plans:   Discharge location:  [address]  Discharge ride contacted:  Jed Porter [phone]  Approximate discharge time: 1615    Discharge Criteria:  Discharge criteria met and vital signs stable: yes  Patient Belongs:  Patient belongings returned to patient:  yes  Matilda Rosa RN

## 2020-04-09 NOTE — DISCHARGE INSTRUCTIONS
Cardiac Angioplasty/Stent Discharge Instructions - Radial    After you go home:      Have an adult stay with you until tomorrow.    Drink extra fluids for 2 days.    You may resume your normal diet.    No smoking       For 24 hours - due to the sedation you received:    Relax and take it easy.    Do NOT make any important or legal decisions.    Do NOT drive or operate machines at home or at work.    Do NOT drink alcohol.    Care of Wrist Puncture Site:      For the first 24 hrs - check the puncture site every 1-2 hours while awake.    It is normal to have soreness at the puncture site and mild tingling in your hand for up to 3 days.    Remove the bandaid after 24 hours. If there is minor oozing, apply another bandaid and remove it after 12 hours.    You may shower tomorrow.  Do NOT take a bath, or use a hot tub or pool for at least 3 days. Do NOT scrub the site. Do not use lotion or powder near the puncture site.           Activity:          For 2 days:     do not use your hand or arm to support your weight (such as rising from a chair)     do not bend your wrist (such as lifting a garage door).    do not lift more than 5 pounds or exercise your arm (such as tennis, golf or bowling).    Do NOT do any heavy activity such as exercise, lifting, or straining.     Bleeding:      If you start bleeding from the site in your wrist, sit down and press firmly on/above the site for 10 minutes.     Once bleeding stops, keep arm still for 2 hours.     Call [facility] as soon as you can.       Call 911 right away if you have heavy bleeding or bleeding that does not stop.      Medicines:       DO not stop taking Brilinta  until you talk to your cardiologist.          Take your medications,  unless your provider tells you not to.      If you have stopped any medicines, check with your provider about when to restart them..    Follow Up Appointments:      Follow up with [facility] Nurse Practitioner at [facility] of patient  preference in 7-10 days.    Cardiac Rehab will contact you for follow up care.    Call the clinic if:      You have a large or growing hard lump around the site.    The site is red, swollen, hot or tender.    Blood or fluid is draining from the site.    You have chills or a fever greater than 101 F (38 C).    Your arm feels numb, cool or changes color.    You have hives, a rash or unusual itching.    Any questions or concerns.    Other Instructions:       Carry your stent card with you at all times.      [facility] [place]:    [phone] [facility] (7 days a week)

## 2020-04-09 NOTE — PROGRESS NOTES
PATIENT WELLNESS SCREENING    Step 1: Answer all screening questions 1-3.    1. In the last month, have you been in contact with someone who was confirmed or suspected to have Coronavirus/COVID-19? No    2. Do you have the following symptoms?   Fever? No   Cough? No   Shortness of breath? No   Skin rash? No    3. Have you traveled internationally in the last month? No   If so, where? N/A    Step 2: Refer to logic grid below for actions    NO SYMPTOM(S)    ACTIONS:  1. Standard rooming process  2. Provider to assess per normal protocol    POSITIVE SYMPTOM(S)  If positive for ANY of the following symptoms: fever, cough, shortness of breath, rash    ACTIONS  1. Mask patient  2. Room patient as soon as possible  3. Don appropriate PPE when entering room  4. Provider evaluation

## 2020-04-09 NOTE — PROGRESS NOTES
Pt returns to [facility] via  bed from cath lab.   Denies pain, nausea or vomiting. Sleepy but arouses to name and questions. Taking sips water.  Left radial TR band in place without bleeding or hematoma.

## 2020-04-09 NOTE — PROGRESS NOTES
Pt up in halls with stand by assist and tolerated well.  Pt states able to void good amount when up.

## 2020-04-09 NOTE — PROGRESS NOTES
First 3 ml air removed from TR band at 1320 with attempt at 1345 to remove another 3 ml air site had bleeding. 3 ml air re-inserted in radial band. No additional bleeding or hematoma noted. Will wait another 15 to 30 minutes with attempt to remove additional air.

## 2020-04-09 NOTE — PROGRESS NOTES
Care Suites Admission Nursing Note    Patient Information  Name: Candelario Stahl  Age: 49 year old  Reason for admission: coronary intervention  Care Suites arrival time: 0630    Patient Admission/Assessment   Pre-procedure assessment complete: Yes  If abnormal assessment/labs, provider notified: N/A  NPO: Yes  Medications held per instructions/orders: N/A  Consent: MD to obtain  If applicable, pregnancy test status: N/A  Patient oriented to room: Yes  Education/questions answered: Yes  Plan/other: per procedural plan of care    Discharge Planning  Accompanied by: self  Discharge name/phone number: see epic   Overnight post sedation caregiver: yes  Discharge location: home    Shae Landon RN

## 2020-04-10 ENCOUNTER — TELEPHONE (OUTPATIENT)
Dept: CARDIOLOGY | Facility: CLINIC | Age: 50
End: 2020-04-10

## 2020-04-10 NOTE — TELEPHONE ENCOUNTER
Spoke with patient post discharge from care suites after coronary angiography with a right radial approach. Patient underwent IVUS guided PCI of ostial-mid LAD and D1 bifurcation with 3 KORTNEY  Plan:.   -Dual anti-platelet therapy for at least 1 year, and potentially longer if tolerated.  When transitioning off DAPT, would consider long-term P2Y12 inhibitor monotherapy rather than ASA 81 monotherapy if feasible financially  -High-intensity statin  Cardiac rehab  -Medical management of LCx/OM disease for now; if angina continues despite anti-anginal therapy, return for PCI at a later date  -had nausea/vomiting at home yesterday but that is getting better today. Has some atypical symptoms, better than before cath but not gone completely, they sound a little positional but he also wonders if its reflux, they get better with tums. Instructed him that if symptoms worsen, he need to go to ED. But if they stay the same, or improve, keep follow up with Rebecca. Recommend he try omeprazole in the meantime. Pt is comfortable with this plan. Discussed with Dr. Figueredo who was very happy with results yesterday during complex procedure.    Reviewed with patient care of wrist site and that is it normal to have soreness at the puncture site and mild tingling in the hand for up to 3 days. For 2 days use hand to support body weight or bend wrist, do not lift > 5 lbs or exercise the arm. If the wrist site starts to bleed, sit down and place firm pressure at the site with your fingers for 10 minutes. If the bleeding stops, sit still and keep wrist straight for 2 hours.    Instructed patient to call 911 right away if the bleeding is heavy or does not stop. Call the clinic if there is a large or growing lump around the site, the site is red, swollen, hot, or tender, have fever >101 degrees F or chills, your arm or leg feels numb or cool, or hives, a rash, or unusual itching, shortness of breath, or chest discomfort.      Emelia SMITH,  CNP  Cardiology

## 2020-04-13 LAB
INTERPRETATION ECG - MUSE: NORMAL
INTERPRETATION ECG - MUSE: NORMAL

## 2020-04-17 ENCOUNTER — VIRTUAL VISIT (OUTPATIENT)
Dept: CARDIOLOGY | Facility: CLINIC | Age: 50
End: 2020-04-17
Payer: COMMERCIAL

## 2020-04-17 DIAGNOSIS — I25.119 CORONARY ARTERY DISEASE INVOLVING NATIVE CORONARY ARTERY OF NATIVE HEART WITH ANGINA PECTORIS (H): Primary | ICD-10-CM

## 2020-04-17 PROCEDURE — 99212 OFFICE O/P EST SF 10 MIN: CPT | Mod: 95 | Performed by: PHYSICIAN ASSISTANT

## 2020-04-17 NOTE — PROGRESS NOTES
"Candelario Stahl is a 49 year old male who is being evaluated via a billable telephone visit.      The patient has been notified of following:     \"This telephone visit will be conducted via a call between you and your physician/provider. We have found that certain health care needs can be provided without the need for a physical exam.  This service lets us provide the care you need with a short phone conversation.  If a prescription is necessary we can send it directly to your pharmacy.  If lab work is needed we can place an order for that and you can then stop by our lab to have the test done at a later time.    Telephone visits are billed at different rates depending on your insurance coverage. During this emergency period, for some insurers they may be billed the same as an in-person visit.  Please reach out to your insurance provider with any questions.    If during the course of the call the physician/provider feels a telephone visit is not appropriate, you will not be charged for this service.\"    BP: 121/66  HR: 66  Patient unable to obtain weight at home.  Susanne Gorman LPN    Patient has given verbal consent for Telephone visit?  Yes    How would you like to obtain your AVS? Mail a copy    Additional provider notes:  Candelario Stahl is a 49-year-old man from [place] with a past medical history significant for hypertension, tobacco abuse, and family history of coronary artery disease.    On 3/5/2020 he presented to the ED with an acute ST elevation MI and third-degree heart block.  He was taken emergently to the Cath Lab where a temporary pacemaker was placed.  He angiogram showed a 100% RCA occlusion which was treated with 3 drug-eluting stents to the proximal and mid RCA.  There was a residual 70% stenosis in the proximal to mid LAD and 60% distal circumflex and a 60% OM 3 lesion.  He continued to have recurrent chest pain with inferior changes noted on his EKG post procedure and was " brought back to the Cath Lab where he was found to have stent thrombosis of the mid RCA stent.  He underwent high-pressure balloon angioplasty with restoration of LETTY-3 flow.  His pacemaker wire was removed the next day.  He had a follow-up echo which showed overall preserved LV function but possible severe hypokinesis of the basal inferior wall.  RV function was normal.  He was discharged on Brilinta, aspirin, metoprolol, lisinopril, and atorvastatin.    He had a follow-up visit with LUIS Kaur on 3/31/2020.  He was experiencing chest discomfort with exertion that resolved with rest and ultimately it was recommended he undergo a staged intervention to the his residual proximal LAD.  On 4/9 he underwent PCI of the ostial and mid LAD and D1 bifurcation with rotational atherectomy and stenting with 3 drug-eluting stents using a DK crush technique.    I spoke with Candelario today.  He overall notes he is feeling very well.  The day after his procedure he had some issues with nausea and vomiting but that he is has completely resolved.  He is not having any chest discomfort whatsoever and his prior exertional chest discomfort has resolved.  He denies shortness of breath.     Assessment/plan:  Candelario Stahl is a very pleasant 49-year-old male with a history of coronary artery disease and inferior STEMI last month treated with 3 drug-eluting stents to his proximal and mid RCA.  He recently underwent complex intervention to the ostial and mid LAD and D1 bifurcation as noted above.  At this time, it appears his anginal symptoms have resolved.  He is on aspirin and Brilinta for dual antiplatelet therapy to be continued or at least 1 year uninterrupted.  Per the Cath Lab report, could consider long-term P2Y12 monotherapy after dual antiplatelet therapy if feasible.  He has known circumflex and OM disease which we will continue to medically manage for now.  If he has recurrent angina could consider intervention to  this in the future.  He is also appropriately on high intensity statin therapy, beta blocker, and lisinopril.     His LDL was 146 on 3/5/2020.  He is currently on Lipitor 40 mg daily. He will need a follow up lipid profile and aggressive management of his lipids.     I recommended he follow up with Dr. Salazar in three months. He'll contact us sooner if he develops any issues.     Phone call duration: 6 minutes    Radha Gallardo PA-C

## 2020-04-17 NOTE — PATIENT INSTRUCTIONS
Thank you for your [facility] visit today. Your provider has recommended the following:    Medication Changes:  No medications changes today  Recommendations:  Call us if you have recurrent chest pain or any other concerns.   Follow-up:  See Dr. Salazar for cardiology follow up in 3 months. We'll repeat a cholesterol panel at that time.     Reminder:  Please bring in all current medications, over the counter supplements and vitamin bottles to your next appointment.            [facility]

## 2020-10-01 DIAGNOSIS — I25.10 CORONARY ARTERY DISEASE INVOLVING NATIVE CORONARY ARTERY OF NATIVE HEART WITHOUT ANGINA PECTORIS: ICD-10-CM

## 2020-10-01 RX ORDER — METOPROLOL TARTRATE 25 MG/1
25 TABLET, FILM COATED ORAL 2 TIMES DAILY
Qty: 180 TABLET | Refills: 2 | OUTPATIENT
Start: 2020-10-01

## 2020-10-02 RX ORDER — ASPIRIN 81 MG/1
TABLET ORAL
Qty: 100 TABLET | Refills: 3 | Status: SHIPPED | OUTPATIENT
Start: 2020-10-02 | End: 2021-10-06

## 2020-11-16 ENCOUNTER — OFFICE VISIT (OUTPATIENT)
Dept: FAMILY MEDICINE | Facility: CLINIC | Age: 50
End: 2020-11-16
Payer: COMMERCIAL

## 2020-11-16 ENCOUNTER — HEALTH MAINTENANCE LETTER (OUTPATIENT)
Age: 50
End: 2020-11-16

## 2020-11-16 VITALS
WEIGHT: 185 LBS | OXYGEN SATURATION: 100 % | DIASTOLIC BLOOD PRESSURE: 92 MMHG | HEART RATE: 72 BPM | TEMPERATURE: 98 F | SYSTOLIC BLOOD PRESSURE: 140 MMHG | BODY MASS INDEX: 28.13 KG/M2

## 2020-11-16 DIAGNOSIS — I25.10 CORONARY ARTERY DISEASE INVOLVING NATIVE CORONARY ARTERY OF NATIVE HEART WITHOUT ANGINA PECTORIS: ICD-10-CM

## 2020-11-16 DIAGNOSIS — I21.19 ACUTE ST ELEVATION MYOCARDIAL INFARCTION (STEMI) OF INFERIOR WALL (H): ICD-10-CM

## 2020-11-16 DIAGNOSIS — R07.9 CARDIAC CHEST PAIN: Primary | ICD-10-CM

## 2020-11-16 DIAGNOSIS — J30.1 SEASONAL ALLERGIC RHINITIS DUE TO POLLEN: ICD-10-CM

## 2020-11-16 LAB — D DIMER PPP FEU-MCNC: <0.3 UG/ML FEU (ref 0–0.5)

## 2020-11-16 PROCEDURE — 99214 OFFICE O/P EST MOD 30 MIN: CPT | Performed by: FAMILY MEDICINE

## 2020-11-16 PROCEDURE — 36415 COLL VENOUS BLD VENIPUNCTURE: CPT | Performed by: FAMILY MEDICINE

## 2020-11-16 PROCEDURE — 84484 ASSAY OF TROPONIN QUANT: CPT | Performed by: FAMILY MEDICINE

## 2020-11-16 PROCEDURE — 93000 ELECTROCARDIOGRAM COMPLETE: CPT | Performed by: FAMILY MEDICINE

## 2020-11-16 PROCEDURE — 85379 FIBRIN DEGRADATION QUANT: CPT | Performed by: FAMILY MEDICINE

## 2020-11-16 PROCEDURE — 80053 COMPREHEN METABOLIC PANEL: CPT | Performed by: FAMILY MEDICINE

## 2020-11-16 RX ORDER — FLUTICASONE PROPIONATE 50 MCG
1 SPRAY, SUSPENSION (ML) NASAL DAILY
Qty: 16 G | Refills: 1 | Status: SHIPPED | OUTPATIENT
Start: 2020-11-16 | End: 2021-12-17

## 2020-11-16 RX ORDER — CETIRIZINE HYDROCHLORIDE 10 MG/1
10 TABLET ORAL DAILY
Qty: 90 TABLET | Refills: 1 | Status: SHIPPED | OUTPATIENT
Start: 2020-11-16 | End: 2021-11-05

## 2020-11-16 RX ORDER — METOPROLOL TARTRATE 50 MG
50 TABLET ORAL 2 TIMES DAILY
Qty: 60 TABLET | Refills: 0 | Status: SHIPPED | OUTPATIENT
Start: 2020-11-16 | End: 2020-12-16

## 2020-11-16 NOTE — PROGRESS NOTES
Subjective     Candelario Stahl is a 50 year old male who presents to clinic today for the following health issues:    HPI         Concern - Heart Pain, left side  Onset: 2 weeks  Description: Hx of Heart attack 03/2020, stint put in.  :  none       Review of Systems   Constitutional, HEENT, cardiovascular, pulmonary, gi and gu systems are negative, except as otherwise noted.      Objective    BP (!) 140/92   Pulse 72   Temp 98  F (36.7  C) (Tympanic)   Wt 83.9 kg (185 lb)   SpO2 100%   BMI 28.13 kg/m    Body mass index is 28.13 kg/m .  Physical Exam   GENERAL: healthy, alert and no distress  NECK: no adenopathy, no asymmetry, masses, or scars and thyroid normal to palpation  RESP: lungs clear to auscultation - no rales, rhonchi or wheezes  CV: regular rate and rhythm, normal S1 S2, no S3 or S4, no murmur, click or rub, no peripheral edema and peripheral pulses strong  ABDOMEN: soft, nontender, no hepatosplenomegaly, no masses and bowel sounds normal  MS: pain on chest wall, has point tenderness on left 4th and 5th intercostal muscle             Assessment & Plan     Chest pain    - EKG 12-lead complete w/read - Clinics    Coronary artery disease involving native coronary artery of native heart without angina pectoris    - metoprolol tartrate (LOPRESSOR) 50 MG tablet; Take 1 tablet (50 mg) by mouth 2 times daily    Acute ST elevation myocardial infarction (STEMI) of inferior wall (H)    - Troponin I  - D dimer, quantitative  - CARDIOLOGY EVAL ADULT REFERRAL; Future  - Comprehensive metabolic panel (BMP + Alb, Alk Phos, ALT, AST, Total. Bili, TP)    Cardiac chest pain  Has left chest pain with slightly elevated BP, has point tenderness on left chest wall has recent h/o CAD, has 3 stents, and currently taking Brilinta and ass  He was supposed to see cardiology(Dr Salazar), but pt did not f/u  His today's EKG looks stable, will increase the dose of metoprolol to 50mg  And will review the lab for assessment just in  "case  Gave another referral having him to see cardiology as f/u   - Troponin I  - D dimer, quantitative  - CARDIOLOGY EVAL ADULT REFERRAL; Future  - metoprolol tartrate (LOPRESSOR) 50 MG tablet; Take 1 tablet (50 mg) by mouth 2 times daily  - Comprehensive metabolic panel (BMP + Alb, Alk Phos, ALT, AST, Total. Bili, TP)    Seasonal allergic rhinitis due to pollen  Worsening sinus HA, will have him to try Flonase in addition on zyrtec   - fluticasone (FLONASE) 50 MCG/ACT nasal spray; Spray 1 spray into both nostrils daily  - cetirizine (ZYRTEC) 10 MG tablet; Take 1 tablet (10 mg) by mouth daily     BMI:   Estimated body mass index is 28.13 kg/m  as calculated from the following:    Height as of 4/9/20: 1.727 m (5' 8\").    Weight as of this encounter: 83.9 kg (185 lb).            FUTURE APPOINTMENTS:       - Follow-up visit in 2 weeks     No follow-ups on file.    Devon Shaw MD  [facility]      "

## 2020-11-17 LAB
ALBUMIN SERPL-MCNC: 3.9 G/DL (ref 3.4–5)
ALP SERPL-CCNC: 90 U/L (ref 40–150)
ALT SERPL W P-5'-P-CCNC: 54 U/L (ref 0–70)
ANION GAP SERPL CALCULATED.3IONS-SCNC: 5 MMOL/L (ref 3–14)
AST SERPL W P-5'-P-CCNC: 20 U/L (ref 0–45)
BILIRUB SERPL-MCNC: 0.6 MG/DL (ref 0.2–1.3)
BUN SERPL-MCNC: 17 MG/DL (ref 7–30)
CALCIUM SERPL-MCNC: 8.9 MG/DL (ref 8.5–10.1)
CHLORIDE SERPL-SCNC: 107 MMOL/L (ref 94–109)
CO2 SERPL-SCNC: 26 MMOL/L (ref 20–32)
CREAT SERPL-MCNC: 0.96 MG/DL (ref 0.66–1.25)
GFR SERPL CREATININE-BSD FRML MDRD: >90 ML/MIN/{1.73_M2}
GLUCOSE SERPL-MCNC: 88 MG/DL (ref 70–99)
POTASSIUM SERPL-SCNC: 3.9 MMOL/L (ref 3.4–5.3)
PROT SERPL-MCNC: 7.2 G/DL (ref 6.8–8.8)
SODIUM SERPL-SCNC: 138 MMOL/L (ref 133–144)
TROPONIN I SERPL-MCNC: <0.015 UG/L (ref 0–0.04)

## 2020-11-30 DIAGNOSIS — I25.119 CORONARY ARTERY DISEASE INVOLVING NATIVE CORONARY ARTERY OF NATIVE HEART WITH ANGINA PECTORIS (H): ICD-10-CM

## 2020-11-30 LAB
ALT SERPL W P-5'-P-CCNC: 51 U/L (ref 0–70)
CHOLEST SERPL-MCNC: 92 MG/DL
HDLC SERPL-MCNC: 31 MG/DL
LDLC SERPL CALC-MCNC: 45 MG/DL
NONHDLC SERPL-MCNC: 61 MG/DL
TRIGL SERPL-MCNC: 78 MG/DL

## 2020-11-30 PROCEDURE — 80061 LIPID PANEL: CPT | Performed by: PHYSICIAN ASSISTANT

## 2020-11-30 PROCEDURE — 84460 ALANINE AMINO (ALT) (SGPT): CPT | Performed by: PHYSICIAN ASSISTANT

## 2020-11-30 PROCEDURE — 36415 COLL VENOUS BLD VENIPUNCTURE: CPT | Performed by: PHYSICIAN ASSISTANT

## 2020-12-08 ENCOUNTER — VIRTUAL VISIT (OUTPATIENT)
Dept: CARDIOLOGY | Facility: CLINIC | Age: 50
End: 2020-12-08
Attending: PHYSICIAN ASSISTANT
Payer: COMMERCIAL

## 2020-12-08 DIAGNOSIS — R07.9 CARDIAC CHEST PAIN: ICD-10-CM

## 2020-12-08 DIAGNOSIS — I25.119 CORONARY ARTERY DISEASE INVOLVING NATIVE CORONARY ARTERY OF NATIVE HEART WITH ANGINA PECTORIS (H): ICD-10-CM

## 2020-12-08 DIAGNOSIS — I21.19 ACUTE ST ELEVATION MYOCARDIAL INFARCTION (STEMI) OF INFERIOR WALL (H): ICD-10-CM

## 2020-12-08 PROCEDURE — 99214 OFFICE O/P EST MOD 30 MIN: CPT | Mod: 95 | Performed by: INTERNAL MEDICINE

## 2020-12-08 NOTE — PATIENT INSTRUCTIONS
1. Increase lisinopril to 20 mg daily  2. Follow up lipid panel in 3 months  3. Follow up with Dr. Salazar in 6 months   4. Please call us with recurrent chest pain

## 2020-12-08 NOTE — PROGRESS NOTES
"    Candelario Stahl is a 50 year old male who is being evaluated via a billable video visit.      The patient has been notified of following:     \"This video visit will be conducted via a call between you and your physician/provider. We have found that certain health care needs can be provided without the need for an in-person physical exam.  This service lets us provide the care you need with a video conversation.  If a prescription is necessary we can send it directly to your pharmacy.  If lab work is needed we can place an order for that and you can then stop by our lab to have the test done at a later time.    Video visits are billed at different rates depending on your insurance coverage.  Please reach out to your insurance provider with any questions.    If during the course of the call the physician/provider feels a video visit is not appropriate, you will not be charged for this service.\"    Patient has given verbal consent for Video visit? Yes  How would you like to obtain your AVS? MyChart  If you are dropped from the video visit, the video invite should be resent to: Send to e-mail at: [email or web address]  Will anyone else be joining your video visit? No      Vitals - Patient Reported  Systolic (Patient Reported): (!) 160  Diastolic (Patient Reported): (!) 100  Height (Patient Reported): 172.7 cm (5' 8\")  Temperature (Patient Reported): 95.5  F (35.3  C)  Pulse (Patient Reported): 60    Review Of Systems  Skin: NEGATIVE  Eyes:Ears/Nose/Throat: NEGATIVE  Respiratory: NEGATIVE  Cardiovascular:NEGATIVE  Gastrointestinal: NEGATIVE  Genitourinary:NEGATIVE   Musculoskeletal: NEGATIVE  Neurologic: NEGATIVE  Psychiatric: NEGATIVE  Hematologic/Lymphatic/Immunologic: NEGATIVE  Endocrine:  NEGATIVE    Jaclyn Ellison [facility]    PROVIDER NOTES:     Candelario Stahl is a 50-year-old man from [place] with a past medical history significant for hypertension, tobacco abuse, and family history of coronary artery " disease.     From prior records: On 3/5/2020 he presented to the ED with an acute ST elevation MI and third-degree heart block.  He was taken emergently to the Cath Lab where a temporary pacemaker was placed.  He angiogram showed a 100% RCA occlusion which was treated with 3 drug-eluting stents to the proximal and mid RCA.  There was a residual 70% stenosis in the proximal to mid LAD and 60% distal circumflex and a 60% OM 3 lesion.  He continued to have recurrent chest pain with inferior changes noted on his EKG post procedure and was brought back to the Cath Lab where he was found to have stent thrombosis of the mid RCA stent.  He underwent high-pressure balloon angioplasty with restoration of LETTY-3 flow.  His pacemaker wire was removed the next day.  He had a follow-up echo which showed overall preserved LV function but possible severe hypokinesis of the basal inferior wall.  RV function was normal.  He was discharged on Brilinta, aspirin, metoprolol, lisinopril, and atorvastatin. He had a follow-up visit with LUIS Kaur on 3/31/2020.  He was experiencing chest discomfort with exertion that resolved with rest and ultimately it was recommended he undergo a staged intervention to the his residual proximal LAD.  On 4/9 he underwent PCI of the ostial and mid LAD and D1 bifurcation with rotational atherectomy and stenting with 3 drug-eluting stents using a DK crush technique. He had follow up in April with Radha and was improved.    Today he is here to establish cardiac care with me. He had chest pain last two weeks but had increase in medication by PCP and better with better BP control. He has not had recurrence and was rx'd sl nitroglycerin and not needing to use it. Unfortunately BP remains not optimal control. He is on lisinopril and metoprolol.    ROS neg for fevers, chills, ns, COVID exposure. Has no LE edema or orthopnea. He does have some muscle soreness isolated to his left thigh. No  claudication, bruising, bleeding.         Assessment/Plan:    49-year-old male with a history of coronary artery disease and inferior STEMI treated with 3 drug-eluting stents to his proximal and mid RCA.  He then underwent complex intervention to the ostial and mid LAD and D1 bifurcation. Remains on DAPT and anginal symptoms have improved. He has known circumflex and OM disease which we will continue to medically manage for now. If he has recurrent angina despite adequate BP control we could consider intervention to this in the future.  He is also appropriately on high intensity statin therapy, beta blocker, and lisinopril.      1. HLD: His LDL was 146 on 3/5/2020.  He is currently on Lipitor 40 mg daily. He will need a follow up lipid profile in several months.     2. HTN: poorly controlled today. Increase lisinopril to 20 mg daily. Can further uptitrate if needed at future PCP visits, or add imdur 30 mg daily if lisinopril increase is not enough.     3. CAD: as above, continue DAPT. If anginal symptoms progress despite medical therapy then can obtain possible stress test to assess OM/LCX.    Follow up in 6 months.     Charlee Salazar MD MSc  [facility]        Encounter Diagnoses   Name Primary?     Coronary artery disease involving native coronary artery of native heart with angina pectoris (H)      Acute ST elevation myocardial infarction (STEMI) of inferior wall (H)      Cardiac chest pain        CURRENT MEDICATIONS:  Current Outpatient Medications   Medication Sig Dispense Refill     ASPIRIN ADULT LOW STRENGTH 81 MG EC tablet TAKE 1 TABLET (81 MG) BY MOUTH DAILY 100 tablet 3     atorvastatin (LIPITOR) 40 MG tablet Take 1 tablet (40 mg) by mouth daily 90 tablet 3     cetirizine (ZYRTEC) 10 MG tablet Take 1 tablet (10 mg) by mouth daily 90 tablet 1     fluticasone (FLONASE) 50 MCG/ACT nasal spray Spray 1 spray into both nostrils daily 16 g 1     lisinopril (ZESTRIL) 10 MG tablet Take 1 tablet (10 mg) by  mouth daily 90 tablet 3     metoprolol tartrate (LOPRESSOR) 50 MG tablet Take 1 tablet (50 mg) by mouth 2 times daily 60 tablet 0     multivitamin, therapeutic (THERA-VIT) TABS tablet Take 1 tablet by mouth Takes when remembers, usually once weekly.       nitroGLYcerin (NITROSTAT) 0.4 MG sublingual tablet For chest pain place 1 tablet under the tongue every 5 minutes for 3 doses. If symptoms persist 5 minutes after 2 nd dose call 911. 30 tablet 1     ticagrelor (BRILINTA) 90 MG tablet Take 1 tablet (90 mg) by mouth every 12 hours 60 tablet 10       ALLERGIES   No Known Allergies    PAST MEDICAL, SURGICAL, FAMILY, SOCIAL HISTORY:  History was reviewed and updated as needed, see medical record.    Review of Systems:  A 12-point review of systems was completed, see medical record for detailed review of systems information.    Physical Exam:  Vitals: There were no vitals taken for this visit.  GENERAL: healthy, alert and no distress  EYES: Eyes grossly normal to inspection, conjunctivae and sclerae normal  RESP: no audible wheeze, cough, or visible cyanosis.  No visible retractions or increased work of breathing.  Able to speak fully in complete sentences  NEURO: Cranial nerves grossly intact, mentation intact and speech normal  PSYCH: mentation appears normal, affect normal/bright, judgement and insight intact, normal speech and appearance well-groomed  CARDIOVASCULAR: Neck veins not appreciated indicating probable normal JVP. No LE edema. Normal skin appearance, no stasis dermatitis.       Recent Lab Results:  LIPID RESULTS:  Lab Results   Component Value Date    CHOL 92 11/30/2020    HDL 31 (L) 11/30/2020    LDL 45 11/30/2020    TRIG 78 11/30/2020       LIVER ENZYME RESULTS:  Lab Results   Component Value Date    AST 20 11/16/2020    ALT 51 11/30/2020       CBC RESULTS:  Lab Results   Component Value Date    WBC 8.7 04/09/2020    RBC 5.17 04/09/2020    HGB 14.3 04/09/2020    HCT 43.7 04/09/2020    MCV 85 04/09/2020     MCH 27.7 04/09/2020    MCHC 32.7 04/09/2020    RDW 13.2 04/09/2020     04/09/2020       BMP RESULTS:  Lab Results   Component Value Date     11/16/2020    POTASSIUM 3.9 11/16/2020    CHLORIDE 107 11/16/2020    CO2 26 11/16/2020    ANIONGAP 5 11/16/2020    GLC 88 11/16/2020    BUN 17 11/16/2020    CR 0.96 11/16/2020    GFRESTIMATED >90 11/16/2020    GFRESTBLACK >90 11/16/2020    RINA 8.9 11/16/2020        A1C RESULTS:  Lab Results   Component Value Date    A1C 5.7 (H) 03/06/2020       INR RESULTS:  Lab Results   Component Value Date    INR 1.02 04/09/2020    INR 1.08 03/05/2020           ADILIA Gallardo PA-C  [address]  [place] [address]        Video-Visit Details    Type of service:  Video Visit    Video Time: 14 minutes    Originating Location (pt. Location): Home    Distant Location (provider location):  [facility] [place]     Platform used for Video Visit: Olivia

## 2020-12-08 NOTE — LETTER
"12/8/2020    Physician No Ref-Primary  No address on file    RE: Candelario Stahl       Dear Colleague,    I had the pleasure of seeing Candelario Stahl in the [facility].        Candelario Stahl is a 50 year old male who is being evaluated via a billable video visit.      The patient has been notified of following:     \"This video visit will be conducted via a call between you and your physician/provider. We have found that certain health care needs can be provided without the need for an in-person physical exam.  This service lets us provide the care you need with a video conversation.  If a prescription is necessary we can send it directly to your pharmacy.  If lab work is needed we can place an order for that and you can then stop by our lab to have the test done at a later time.    Video visits are billed at different rates depending on your insurance coverage.  Please reach out to your insurance provider with any questions.    If during the course of the call the physician/provider feels a video visit is not appropriate, you will not be charged for this service.\"    Patient has given verbal consent for Video visit? Yes  How would you like to obtain your AVS? MyChart  If you are dropped from the video visit, the video invite should be resent to: Send to e-mail at: [email or web address]  Will anyone else be joining your video visit? No      Vitals - Patient Reported  Systolic (Patient Reported): (!) 160  Diastolic (Patient Reported): (!) 100  Height (Patient Reported): 172.7 cm (5' 8\")  Temperature (Patient Reported): 95.5  F (35.3  C)  Pulse (Patient Reported): 60    Review Of Systems  Skin: NEGATIVE  Eyes:Ears/Nose/Throat: NEGATIVE  Respiratory: NEGATIVE  Cardiovascular:NEGATIVE  Gastrointestinal: NEGATIVE  Genitourinary:NEGATIVE   Musculoskeletal: NEGATIVE  Neurologic: NEGATIVE  Psychiatric: NEGATIVE  Hematologic/Lymphatic/Immunologic: NEGATIVE  Endocrine:  NEGATIVE    Jaclyn " Scot GANT    PROVIDER NOTES:     Candelario Stahl is a 50-year-old man from [place] with a past medical history significant for hypertension, tobacco abuse, and family history of coronary artery disease.     From prior records: On 3/5/2020 he presented to the ED with an acute ST elevation MI and third-degree heart block.  He was taken emergently to the Cath Lab where a temporary pacemaker was placed.  He angiogram showed a 100% RCA occlusion which was treated with 3 drug-eluting stents to the proximal and mid RCA.  There was a residual 70% stenosis in the proximal to mid LAD and 60% distal circumflex and a 60% OM 3 lesion.  He continued to have recurrent chest pain with inferior changes noted on his EKG post procedure and was brought back to the Cath Lab where he was found to have stent thrombosis of the mid RCA stent.  He underwent high-pressure balloon angioplasty with restoration of LETTY-3 flow.  His pacemaker wire was removed the next day.  He had a follow-up echo which showed overall preserved LV function but possible severe hypokinesis of the basal inferior wall.  RV function was normal.  He was discharged on Brilinta, aspirin, metoprolol, lisinopril, and atorvastatin. He had a follow-up visit with LUIS Kaur on 3/31/2020.  He was experiencing chest discomfort with exertion that resolved with rest and ultimately it was recommended he undergo a staged intervention to the his residual proximal LAD.  On 4/9 he underwent PCI of the ostial and mid LAD and D1 bifurcation with rotational atherectomy and stenting with 3 drug-eluting stents using a DK crush technique. He had follow up in April with Radha and was improved.    Today he is here to establish cardiac care with me. He had chest pain last two weeks but had increase in medication by PCP and better with better BP control. He has not had recurrence and was rx'd sl nitroglycerin and not needing to use it. Unfortunately BP remains not  optimal control. He is on lisinopril and metoprolol.    ROS neg for fevers, chills, ns, COVID exposure. Has no LE edema or orthopnea. He does have some muscle soreness isolated to his left thigh. No claudication, bruising, bleeding.         Assessment/Plan:    49-year-old male with a history of coronary artery disease and inferior STEMI treated with 3 drug-eluting stents to his proximal and mid RCA.  He then underwent complex intervention to the ostial and mid LAD and D1 bifurcation. Remains on DAPT and anginal symptoms have improved. He has known circumflex and OM disease which we will continue to medically manage for now. If he has recurrent angina despite adequate BP control we could consider intervention to this in the future.  He is also appropriately on high intensity statin therapy, beta blocker, and lisinopril.      1. HLD: His LDL was 146 on 3/5/2020.  He is currently on Lipitor 40 mg daily. He will need a follow up lipid profile in several months.     2. HTN: poorly controlled today. Increase lisinopril to 20 mg daily. Can further uptitrate if needed at future PCP visits, or add imdur 30 mg daily if lisinopril increase is not enough.     3. CAD: as above, continue DAPT. If anginal symptoms progress despite medical therapy then can obtain possible stress test to assess OM/LCX.    Follow up in 6 months.     Charlee Salazar MD MSc  [facility]        Encounter Diagnoses   Name Primary?     Coronary artery disease involving native coronary artery of native heart with angina pectoris (H)      Acute ST elevation myocardial infarction (STEMI) of inferior wall (H)      Cardiac chest pain        CURRENT MEDICATIONS:  Current Outpatient Medications   Medication Sig Dispense Refill     ASPIRIN ADULT LOW STRENGTH 81 MG EC tablet TAKE 1 TABLET (81 MG) BY MOUTH DAILY 100 tablet 3     atorvastatin (LIPITOR) 40 MG tablet Take 1 tablet (40 mg) by mouth daily 90 tablet 3     cetirizine (ZYRTEC) 10 MG tablet Take 1  tablet (10 mg) by mouth daily 90 tablet 1     fluticasone (FLONASE) 50 MCG/ACT nasal spray Spray 1 spray into both nostrils daily 16 g 1     lisinopril (ZESTRIL) 10 MG tablet Take 1 tablet (10 mg) by mouth daily 90 tablet 3     metoprolol tartrate (LOPRESSOR) 50 MG tablet Take 1 tablet (50 mg) by mouth 2 times daily 60 tablet 0     multivitamin, therapeutic (THERA-VIT) TABS tablet Take 1 tablet by mouth Takes when remembers, usually once weekly.       nitroGLYcerin (NITROSTAT) 0.4 MG sublingual tablet For chest pain place 1 tablet under the tongue every 5 minutes for 3 doses. If symptoms persist 5 minutes after 2 nd dose call 911. 30 tablet 1     ticagrelor (BRILINTA) 90 MG tablet Take 1 tablet (90 mg) by mouth every 12 hours 60 tablet 10       ALLERGIES   No Known Allergies    PAST MEDICAL, SURGICAL, FAMILY, SOCIAL HISTORY:  History was reviewed and updated as needed, see medical record.    Review of Systems:  A 12-point review of systems was completed, see medical record for detailed review of systems information.    Physical Exam:  Vitals: There were no vitals taken for this visit.  GENERAL: healthy, alert and no distress  EYES: Eyes grossly normal to inspection, conjunctivae and sclerae normal  RESP: no audible wheeze, cough, or visible cyanosis.  No visible retractions or increased work of breathing.  Able to speak fully in complete sentences  NEURO: Cranial nerves grossly intact, mentation intact and speech normal  PSYCH: mentation appears normal, affect normal/bright, judgement and insight intact, normal speech and appearance well-groomed  CARDIOVASCULAR: Neck veins not appreciated indicating probable normal JVP. No LE edema. Normal skin appearance, no stasis dermatitis.       Recent Lab Results:  LIPID RESULTS:  Lab Results   Component Value Date    CHOL 92 11/30/2020    HDL 31 (L) 11/30/2020    LDL 45 11/30/2020    TRIG 78 11/30/2020       LIVER ENZYME RESULTS:  Lab Results   Component Value Date    AST 20  11/16/2020    ALT 51 11/30/2020       CBC RESULTS:  Lab Results   Component Value Date    WBC 8.7 04/09/2020    RBC 5.17 04/09/2020    HGB 14.3 04/09/2020    HCT 43.7 04/09/2020    MCV 85 04/09/2020    MCH 27.7 04/09/2020    MCHC 32.7 04/09/2020    RDW 13.2 04/09/2020     04/09/2020       BMP RESULTS:  Lab Results   Component Value Date     11/16/2020    POTASSIUM 3.9 11/16/2020    CHLORIDE 107 11/16/2020    CO2 26 11/16/2020    ANIONGAP 5 11/16/2020    GLC 88 11/16/2020    BUN 17 11/16/2020    CR 0.96 11/16/2020    GFRESTIMATED >90 11/16/2020    GFRESTBLACK >90 11/16/2020    RINA 8.9 11/16/2020        A1C RESULTS:  Lab Results   Component Value Date    A1C 5.7 (H) 03/06/2020       INR RESULTS:  Lab Results   Component Value Date    INR 1.02 04/09/2020    INR 1.08 03/05/2020           CC  Radha Gallardo PA-C  [address]  [place]  [place] [address]        Video-Visit Details    Type of service:  Video Visit    Video Time: 14 minutes    Originating Location (pt. Location): Home    Distant Location (provider location):  [facility]     Platform used for Video Visit: Olivia              Thank you for allowing me to participate in the care of your patient.      Sincerely,     Charlee Salazar MD     [facility]    cc:   Radha Gallardo PA-C  [address]  [place]  [place] [address]

## 2020-12-15 DIAGNOSIS — I25.10 CORONARY ARTERY DISEASE INVOLVING NATIVE CORONARY ARTERY OF NATIVE HEART WITHOUT ANGINA PECTORIS: ICD-10-CM

## 2020-12-15 DIAGNOSIS — R07.9 CARDIAC CHEST PAIN: ICD-10-CM

## 2020-12-16 RX ORDER — METOPROLOL TARTRATE 50 MG
50 TABLET ORAL 2 TIMES DAILY
Qty: 180 TABLET | Refills: 1 | Status: SHIPPED | OUTPATIENT
Start: 2020-12-16 | End: 2021-06-16

## 2021-03-02 DIAGNOSIS — I25.10 CORONARY ARTERY DISEASE INVOLVING NATIVE CORONARY ARTERY OF NATIVE HEART WITHOUT ANGINA PECTORIS: ICD-10-CM

## 2021-03-02 RX ORDER — TICAGRELOR 90 MG/1
TABLET ORAL
Qty: 60 TABLET | Refills: 7 | Status: SHIPPED | OUTPATIENT
Start: 2021-03-02 | End: 2021-11-05

## 2021-03-15 ENCOUNTER — NURSE TRIAGE (OUTPATIENT)
Dept: NURSING | Facility: CLINIC | Age: 51
End: 2021-03-15

## 2021-03-15 ENCOUNTER — OFFICE VISIT (OUTPATIENT)
Dept: FAMILY MEDICINE | Facility: CLINIC | Age: 51
End: 2021-03-15
Payer: COMMERCIAL

## 2021-03-15 ENCOUNTER — APPOINTMENT (OUTPATIENT)
Dept: URGENT CARE | Facility: CLINIC | Age: 51
End: 2021-03-15
Payer: COMMERCIAL

## 2021-03-15 VITALS
WEIGHT: 186 LBS | SYSTOLIC BLOOD PRESSURE: 126 MMHG | HEART RATE: 57 BPM | DIASTOLIC BLOOD PRESSURE: 84 MMHG | BODY MASS INDEX: 29.89 KG/M2 | OXYGEN SATURATION: 98 % | RESPIRATION RATE: 16 BRPM | TEMPERATURE: 97.4 F | HEIGHT: 66 IN

## 2021-03-15 DIAGNOSIS — I25.119 CORONARY ARTERY DISEASE INVOLVING NATIVE CORONARY ARTERY OF NATIVE HEART WITH ANGINA PECTORIS (H): ICD-10-CM

## 2021-03-15 DIAGNOSIS — R57.0 CARDIOGENIC SHOCK (H): ICD-10-CM

## 2021-03-15 DIAGNOSIS — I44.2 COMPLETE HEART BLOCK (H): ICD-10-CM

## 2021-03-15 DIAGNOSIS — H00.021 HORDEOLUM INTERNUM OF RIGHT UPPER EYELID: Primary | ICD-10-CM

## 2021-03-15 PROCEDURE — 99213 OFFICE O/P EST LOW 20 MIN: CPT | Performed by: NURSE PRACTITIONER

## 2021-03-15 RX ORDER — ERYTHROMYCIN 5 MG/G
0.5 OINTMENT OPHTHALMIC 2 TIMES DAILY
Qty: 1 G | Refills: 1 | Status: SHIPPED | OUTPATIENT
Start: 2021-03-15 | End: 2021-12-17

## 2021-03-15 RX ORDER — CEPHALEXIN 500 MG/1
500 CAPSULE ORAL 2 TIMES DAILY
Qty: 14 CAPSULE | Refills: 0 | Status: SHIPPED | OUTPATIENT
Start: 2021-03-15 | End: 2021-03-22

## 2021-03-15 RX ORDER — DOXYCYCLINE 100 MG/1
100 CAPSULE ORAL 2 TIMES DAILY
Qty: 14 CAPSULE | Refills: 0 | Status: CANCELLED | OUTPATIENT
Start: 2021-03-15 | End: 2021-03-22

## 2021-03-15 RX ORDER — AZITHROMYCIN 250 MG/1
TABLET, FILM COATED ORAL
Qty: 6 TABLET | Refills: 0 | Status: CANCELLED | OUTPATIENT
Start: 2021-03-15 | End: 2021-03-20

## 2021-03-15 ASSESSMENT — ANXIETY QUESTIONNAIRES
2. NOT BEING ABLE TO STOP OR CONTROL WORRYING: NOT AT ALL
3. WORRYING TOO MUCH ABOUT DIFFERENT THINGS: SEVERAL DAYS
5. BEING SO RESTLESS THAT IT IS HARD TO SIT STILL: NOT AT ALL
6. BECOMING EASILY ANNOYED OR IRRITABLE: NOT AT ALL
GAD7 TOTAL SCORE: 1
7. FEELING AFRAID AS IF SOMETHING AWFUL MIGHT HAPPEN: NOT AT ALL
IF YOU CHECKED OFF ANY PROBLEMS ON THIS QUESTIONNAIRE, HOW DIFFICULT HAVE THESE PROBLEMS MADE IT FOR YOU TO DO YOUR WORK, TAKE CARE OF THINGS AT HOME, OR GET ALONG WITH OTHER PEOPLE: NOT DIFFICULT AT ALL
1. FEELING NERVOUS, ANXIOUS, OR ON EDGE: NOT AT ALL

## 2021-03-15 ASSESSMENT — PATIENT HEALTH QUESTIONNAIRE - PHQ9
5. POOR APPETITE OR OVEREATING: NOT AT ALL
SUM OF ALL RESPONSES TO PHQ QUESTIONS 1-9: 2

## 2021-03-15 ASSESSMENT — MIFFLIN-ST. JEOR: SCORE: 1656.44

## 2021-03-15 NOTE — TELEPHONE ENCOUNTER
Patient calling requesting . Connected to  through [facility] Language Line. Patient reporting bump on right eye lid that started yesterday. Reporting today his eye is almost swollen shut. Stating he has watery fluid and some blurry vision. Patient is unsure if the fluid is causing vision changes.  Afebrile.       Mariola Rodriguez RN  [facility]      COVID 19 Nurse Triage Plan/Patient Instructions    Please be aware that novel coronavirus (COVID-19) may be circulating in the community. If you develop symptoms such as fever, cough, or SOB or if you have concerns about the presence of another infection including coronavirus (COVID-19), please contact your health care provider or visit [email or web address].     Disposition/Instructions    In-Person Visit with provider recommended. Reference Visit Selection Guide.    Thank you for taking steps to prevent the spread of this virus.  o Limit your contact with others.  o Wear a simple mask to cover your cough.  o Wash your hands well and often.    Resources    [facility]: About COVID-19: [email or web address]    CDC: What to Do If You're Sick: www.cdc.gov/coronavirus/2019-ncov/about/steps-when-sick.html    CDC: Ending Home Isolation: www.cdc.gov/coronavirus/2019-ncov/hcp/disposition-in-home-patients.html     CDC: Caring for Someone: www.cdc.gov/coronavirus/2019-ncov/if-you-are-sick/care-for-someone.html     [facility]: Interim Guidance for Hospital Discharge to Home: www.health.[facility].[place]/diseases/coronavirus/hcp/hospdischarge.pdf    [facility] clinical trials (COVID-19 research studies): [email or web address]/umn-clinical-trials     Below are the COVID-19 hotlines at the [place] Department of Health ([facility]). Interpreters are available.   o For health questions: Call 917-483-0989 or 1-699.960.8140 (7 a.m. to 7 p.m.)  o For questions about schools and childcare: Call 282-740-6222 or  9-849-487-4146 (7 a.m. to 7 p.m.)                     Additional Information    Negative: Patient sounds very sick or weak to the triager    Negative: [1] Eyelid is red AND [2] fever    Negative: [1] Eyelid is swollen AND [2] fever    Negative: Redness spreads around the eye (both upper and lower eyelid are red)    [1] Blurred vision AND [2] new or worsening    Protocols used: GRACIELA-ACE

## 2021-03-15 NOTE — PROGRESS NOTES
"    Assessment & Plan     Hordeolum internum of right upper eyelid  Comment: History and exam suggest hordeolum. Will treat with application of warm compresses several times a day, Romycin ointment, and a 7 day course of Keflex. No red flags. Discussed reasons to call or return to clinic. Candelario acknowledges and demonstrates understanding of circumstances under which care should be sought urgently or emergently. Follow up as discussed. Discussed risks, benefits, alternatives, potential side effects, and proper administration of new medication / treatment. Agrees with plan of care. All questions answered.   - erythromycin (ROMYCIN) 5 MG/GM ophthalmic ointment  Dispense: 1 g; Refill: 1  - cephALEXin (KEFLEX) 500 MG capsule  Dispense: 14 capsule; Refill: 0    Cardiogenic shock (H)  Comment: STEMI with cardiogenic shock and transient CHB in March 2020. Follows with cardiology. No issues today.     Complete heart block (H)  Comment: STEMI with cardiogenic shock and transient CHB in March 2020. Follows with cardiology. No issues today.     Coronary artery disease involving native coronary artery of native heart with angina pectoris (H)  Comment: STEMI with cardiogenic shock and transient CHB in March 2020. Follows with cardiology. No issues today.          BMI:   Estimated body mass index is 30.02 kg/m  as calculated from the following:    Height as of this encounter: 1.676 m (5' 6\").    Weight as of this encounter: 84.4 kg (186 lb).   Weight management plan: Discussed healthy diet and exercise guidelines    See Patient Instructions    Return in about 1 week (around 3/22/2021) for persistent or worsening symptoms.    Leandro Kuo NP  [facility]    Antonio   Candelario is a 50 year old who presents for the following health issues       Eye(s) Problem  Onset/Duration: yesterday 3/15/2021  Description:   Location: right eye  Pain: YES  Redness: YES  Accompanying Signs & " "Symptoms:  Discharge/mattering: YES  Swelling: YES  Visual changes: YES- a little cloudy  Fever: no  Nasal Congestion: no  Bothered by bright lights: no  History:  Trauma: no  Foreign body exposure: possibly, was  Cleaning out garage over the weekend. Thinks  May have gotten something in eye but does not feel anything  Wearing contacts: no  Precipitating or alleviating factors: None  Therapies tried and outcome: tried rinsing with water and did not help    HPI: Candelario presents today with the complaint of tender, red, warm swollen upper eyelid (right). These symptoms developed yesterday seemingly out of nowhere. Upper lid is tender to the touch. Vision is a little cloudy. No eye pain (only tenderness when lid is touched). Notes yellowish crusting along lid margin. No fever, chills, body aches, respiratory symptoms.     Review of Systems   Constitutional, HEENT, pulmonary, neuro, skin systems are negative, except as otherwise noted.      Objective    /84 (BP Location: Right arm, Patient Position: Sitting, Cuff Size: Adult Regular)   Pulse 57   Temp 97.4  F (36.3  C) (Tympanic)   Resp 16   Ht 1.676 m (5' 6\")   Wt 84.4 kg (186 lb)   SpO2 98%   BMI 30.02 kg/m    Body mass index is 30.02 kg/m .  Physical Exam   GENERAL: healthy, alert and no distress  EYES: Right eye - Upper lid with edema, erythema, heat, and tenderness. Small, hard, stone-like nodule in medial area of lid margin.  PERRLA and EOM function intact. Vision grossly normal.   RESP: lungs clear to auscultation - no rales, rhonchi or wheezes  CV: regular rate and rhythm, normal S1 S2, no S3 or S4, no murmur, click or rub, no peripheral edema and peripheral pulses strong  NEURO: Normal strength and tone, mentation intact and speech normal  PSYCH: mentation appears normal, affect normal/bright                "

## 2021-03-16 ASSESSMENT — ANXIETY QUESTIONNAIRES: GAD7 TOTAL SCORE: 1

## 2021-03-30 DIAGNOSIS — I25.10 CORONARY ARTERY DISEASE INVOLVING NATIVE CORONARY ARTERY OF NATIVE HEART WITHOUT ANGINA PECTORIS: ICD-10-CM

## 2021-03-30 RX ORDER — ATORVASTATIN CALCIUM 40 MG/1
40 TABLET, FILM COATED ORAL DAILY
Qty: 90 TABLET | Refills: 2 | Status: SHIPPED | OUTPATIENT
Start: 2021-03-30 | End: 2021-12-08

## 2021-06-15 DIAGNOSIS — R07.9 CARDIAC CHEST PAIN: ICD-10-CM

## 2021-06-15 DIAGNOSIS — I25.10 CORONARY ARTERY DISEASE INVOLVING NATIVE CORONARY ARTERY OF NATIVE HEART WITHOUT ANGINA PECTORIS: ICD-10-CM

## 2021-06-16 RX ORDER — LISINOPRIL 20 MG/1
TABLET ORAL
Qty: 90 TABLET | Refills: 1 | Status: SHIPPED | OUTPATIENT
Start: 2021-06-16 | End: 2021-12-08

## 2021-06-16 RX ORDER — METOPROLOL TARTRATE 50 MG
50 TABLET ORAL 2 TIMES DAILY
Qty: 180 TABLET | Refills: 1 | Status: SHIPPED | OUTPATIENT
Start: 2021-06-16 | End: 2021-12-09

## 2021-06-16 NOTE — TELEPHONE ENCOUNTER
Prescription approved per [facility] Refill Protocol.    Sandra HOROWITZ RN  EP Triage

## 2021-06-16 NOTE — TELEPHONE ENCOUNTER
Prescription approved per [facility] Refill Protocol.    Sandra HOROWITZ RN  EP Triage

## 2021-06-21 ENCOUNTER — E-VISIT (OUTPATIENT)
Dept: URGENT CARE | Facility: URGENT CARE | Age: 51
End: 2021-06-21
Payer: COMMERCIAL

## 2021-06-21 DIAGNOSIS — J01.90 ACUTE BACTERIAL SINUSITIS: Primary | ICD-10-CM

## 2021-06-21 DIAGNOSIS — B96.89 ACUTE BACTERIAL SINUSITIS: Primary | ICD-10-CM

## 2021-06-21 PROCEDURE — 99421 OL DIG E/M SVC 5-10 MIN: CPT | Performed by: FAMILY MEDICINE

## 2021-06-21 NOTE — PATIENT INSTRUCTIONS
Dear Candelario Stahl    After reviewing your responses, I've been able to diagnose you with?a sinus infection caused by bacteria.?     Based on your responses and diagnosis, I have prescribed Augmentin to treat your symptoms. I have sent this to your pharmacy.?     It is also important to stay well hydrated, get lots of rest and take over-the-counter decongestants,?tylenol?or ibuprofen if you?are able to?take those medications per your primary care provider to help relieve discomfort.?     It is important that you take?all of?your prescribed medication even if your symptoms are improving after a few doses.? Taking?all of?your medicine helps prevent the symptoms from returning.?     If your symptoms worsen, you develop severe headache, vomiting, high fever (>102), or are not improving in 7 days, please contact your primary care provider for an appointment or visit any of our convenient Walk-in Care or Urgent Care Centers to be seen which can be found on our website?here.?     Thanks again for choosing?us?as your health care partner,?   ?  Jesus Marks, DO?

## 2021-09-18 ENCOUNTER — HEALTH MAINTENANCE LETTER (OUTPATIENT)
Age: 51
End: 2021-09-18

## 2021-10-05 DIAGNOSIS — I25.10 CORONARY ARTERY DISEASE INVOLVING NATIVE CORONARY ARTERY OF NATIVE HEART WITHOUT ANGINA PECTORIS: ICD-10-CM

## 2021-10-06 RX ORDER — ASPIRIN 81 MG/1
TABLET ORAL
Qty: 100 TABLET | Refills: 2 | Status: SHIPPED | OUTPATIENT
Start: 2021-10-06

## 2021-11-04 DIAGNOSIS — J30.1 SEASONAL ALLERGIC RHINITIS DUE TO POLLEN: ICD-10-CM

## 2021-11-05 RX ORDER — CETIRIZINE HYDROCHLORIDE 10 MG/1
10 TABLET ORAL DAILY
Qty: 90 TABLET | Refills: 1 | Status: SHIPPED | OUTPATIENT
Start: 2021-11-05 | End: 2021-12-17

## 2021-12-01 ENCOUNTER — OFFICE VISIT (OUTPATIENT)
Dept: FAMILY MEDICINE | Facility: CLINIC | Age: 51
End: 2021-12-01
Payer: COMMERCIAL

## 2021-12-01 VITALS
WEIGHT: 193 LBS | TEMPERATURE: 97.9 F | OXYGEN SATURATION: 98 % | SYSTOLIC BLOOD PRESSURE: 132 MMHG | BODY MASS INDEX: 29.25 KG/M2 | HEART RATE: 68 BPM | HEIGHT: 68 IN | RESPIRATION RATE: 18 BRPM | DIASTOLIC BLOOD PRESSURE: 78 MMHG

## 2021-12-01 DIAGNOSIS — R07.9 CHEST PAIN, UNSPECIFIED TYPE: Primary | ICD-10-CM

## 2021-12-01 DIAGNOSIS — I25.10 CORONARY ARTERY DISEASE INVOLVING NATIVE CORONARY ARTERY OF NATIVE HEART, UNSPECIFIED WHETHER ANGINA PRESENT: ICD-10-CM

## 2021-12-01 DIAGNOSIS — Z95.5 S/P CORONARY ARTERY STENT PLACEMENT: ICD-10-CM

## 2021-12-01 PROBLEM — R57.0 CARDIOGENIC SHOCK (H): Status: RESOLVED | Noted: 2021-03-15 | Resolved: 2021-12-01

## 2021-12-01 PROBLEM — Z98.890 STATUS POST CORONARY ANGIOGRAM: Status: RESOLVED | Noted: 2020-04-09 | Resolved: 2021-12-01

## 2021-12-01 PROBLEM — I44.2 COMPLETE HEART BLOCK (H): Status: RESOLVED | Noted: 2020-03-05 | Resolved: 2021-12-01

## 2021-12-01 LAB
ANION GAP SERPL CALCULATED.3IONS-SCNC: <1 MMOL/L (ref 3–14)
BUN SERPL-MCNC: 20 MG/DL (ref 7–30)
CALCIUM SERPL-MCNC: 8.8 MG/DL (ref 8.5–10.1)
CHLORIDE BLD-SCNC: 108 MMOL/L (ref 94–109)
CHOLEST SERPL-MCNC: 110 MG/DL
CO2 SERPL-SCNC: 30 MMOL/L (ref 20–32)
CREAT SERPL-MCNC: 1.02 MG/DL (ref 0.66–1.25)
ERYTHROCYTE [DISTWIDTH] IN BLOOD BY AUTOMATED COUNT: 13.4 % (ref 10–15)
FASTING STATUS PATIENT QL REPORTED: NO
GFR SERPL CREATININE-BSD FRML MDRD: 86 ML/MIN/1.73M2
GLUCOSE BLD-MCNC: 102 MG/DL (ref 70–99)
HCT VFR BLD AUTO: 48 % (ref 40–53)
HDLC SERPL-MCNC: 32 MG/DL
HGB BLD-MCNC: 15.9 G/DL (ref 13.3–17.7)
LDLC SERPL CALC-MCNC: 26 MG/DL
MCH RBC QN AUTO: 27.5 PG (ref 26.5–33)
MCHC RBC AUTO-ENTMCNC: 33.1 G/DL (ref 31.5–36.5)
MCV RBC AUTO: 83 FL (ref 78–100)
NONHDLC SERPL-MCNC: 78 MG/DL
PLATELET # BLD AUTO: 281 10E3/UL (ref 150–450)
POTASSIUM BLD-SCNC: 4.8 MMOL/L (ref 3.4–5.3)
RBC # BLD AUTO: 5.79 10E6/UL (ref 4.4–5.9)
SODIUM SERPL-SCNC: 138 MMOL/L (ref 133–144)
TRIGL SERPL-MCNC: 262 MG/DL
TROPONIN I SERPL HS-MCNC: 7 NG/L
WBC # BLD AUTO: 11.1 10E3/UL (ref 4–11)

## 2021-12-01 PROCEDURE — 99214 OFFICE O/P EST MOD 30 MIN: CPT | Performed by: INTERNAL MEDICINE

## 2021-12-01 PROCEDURE — 80061 LIPID PANEL: CPT | Performed by: INTERNAL MEDICINE

## 2021-12-01 PROCEDURE — 36415 COLL VENOUS BLD VENIPUNCTURE: CPT | Performed by: INTERNAL MEDICINE

## 2021-12-01 PROCEDURE — 85027 COMPLETE CBC AUTOMATED: CPT | Performed by: INTERNAL MEDICINE

## 2021-12-01 PROCEDURE — 93000 ELECTROCARDIOGRAM COMPLETE: CPT | Performed by: INTERNAL MEDICINE

## 2021-12-01 PROCEDURE — 80048 BASIC METABOLIC PNL TOTAL CA: CPT | Performed by: INTERNAL MEDICINE

## 2021-12-01 PROCEDURE — 84484 ASSAY OF TROPONIN QUANT: CPT | Performed by: INTERNAL MEDICINE

## 2021-12-01 RX ORDER — NITROGLYCERIN 0.4 MG/1
TABLET SUBLINGUAL
Qty: 30 TABLET | Refills: 1 | Status: SHIPPED | OUTPATIENT
Start: 2021-12-01 | End: 2024-01-09

## 2021-12-01 ASSESSMENT — MIFFLIN-ST. JEOR: SCORE: 1714.94

## 2021-12-01 ASSESSMENT — PAIN SCALES - GENERAL: PAINLEVEL: NO PAIN (0)

## 2021-12-01 NOTE — PROGRESS NOTES
"  Assessment & Plan     Chest pain, unspecified type  Candelario is describing concerning symptoms especially with his medical history.  His exam and vitals are reassuring today as is the fact that his symptoms have been going on for 3 weeks and his EKG is unchanged from a year ago.  His symptoms have been present but stable for 3 weeks.  Given the situation of Covid and the ER being so overwhelmed, I think he is safe to be managed as an outpatient.  I have ordered a stat troponin as well as other blood work.  Pending these results, we will either send to the ER or touch base with his cardiologist to see about any sooner follow-up than his upcoming appt on 12/17.  - EKG 12-lead complete w/read - Clinics  - Troponin I; Future  - Creatine Kinase MB Test; Future  - Lipid panel reflex to direct LDL Fasting; Future  - Basic metabolic panel  (Ca, Cl, CO2, Creat, Gluc, K, Na, BUN); Future  - CBC with platelets; Future  - Troponin I  - Lipid panel reflex to direct LDL Fasting  - Basic metabolic panel  (Ca, Cl, CO2, Creat, Gluc, K, Na, BUN)  - CBC with platelets    Coronary artery disease involving native coronary artery of native heart, unspecified whether angina present  As above   - nitroGLYcerin (NITROSTAT) 0.4 MG sublingual tablet; For chest pain place 1 tablet under the tongue every 5 minutes for 3 doses. If symptoms persist 5 minutes after 2 nd dose call 911.  - Troponin I; Future  - Creatine Kinase MB Test; Future  - Lipid panel reflex to direct LDL Fasting; Future  - Basic metabolic panel  (Ca, Cl, CO2, Creat, Gluc, K, Na, BUN); Future  - CBC with platelets; Future  - Troponin I  - Lipid panel reflex to direct LDL Fasting  - Basic metabolic panel  (Ca, Cl, CO2, Creat, Gluc, K, Na, BUN)  - CBC with platelets    S/P coronary artery stent placement  As above                BMI:   Estimated body mass index is 29.35 kg/m  as calculated from the following:    Height as of this encounter: 1.727 m (5' 8\").    Weight as of this " "encounter: 87.5 kg (193 lb).           Return in about 16 days (around 12/17/2021) for previously scheduled appointment, sooner as needed pending lab work .    Amie Mai MD  [facility]    Antonio Palomino is a 51 year old who presents for the following health issues     Candelario is here with about 3 weeks of dizziness.  He reports feeling dizzy, like he is unsteady on his feet and lightheadedness like he might pass out.  He has not fainted or fallen.  But will need to lay down when he feels the symptoms.  It is happening almost every day.  Can come on somewhat randomly, for example just doing his morning routine or at work (works for the cleaning crew at a restaurant).  He has also been experiencing a \"pressure in my head.\"  He also endorses chest tightness in the sense that he cannot get a good breath sometimes.  Occasional nausea and sweating when he is experiencing the dizziness.  He has not had any cough or infectious symptoms.  Denies any focal numbness, weakness, tingling, facial weakness or difficulty swallowing.    He has an appointment with his cardiologist in a couple weeks.  He had a STEMI almost 2 years ago and stent placement.  His last visit with cardiology was virtual and was a year ago.  He has been taking his Brilinta, aspirin, lisinopril, metoprolol, and atorvastatin.  He has a prescription at home from his hospitalization, his never open the bottle.      Review of Systems   Const, cv, pulm, neuro reviewed,  otherwise negative unless noted above.        Objective    /78   Pulse 68   Temp 97.9  F (36.6  C) (Tympanic)   Resp 18   Ht 1.727 m (5' 8\")   Wt 87.5 kg (193 lb)   SpO2 98%   BMI 29.35 kg/m    Body mass index is 29.35 kg/m .  Physical Exam   Gen: well appearing, pleasant man, no distress  HEENT: PERRL, sclera nonicteric, MMM  Pulm: breathing comfortably, CTAB, no wheezes or rales  CV: RRR, normal S1 and S2, no murmurs  Abd: BS present, soft, " nontender, nondistended  Ext: 2+ distal pulses, no LE edema      EKG - Reviewed and interpreted by me appears normal, NSR, normal axis, normal intervals, no acute ST/T changes c/w ischemia, no LVH by voltage criteria, unchanged from previous tracings

## 2021-12-07 DIAGNOSIS — R07.9 CARDIAC CHEST PAIN: ICD-10-CM

## 2021-12-07 DIAGNOSIS — I25.10 CORONARY ARTERY DISEASE INVOLVING NATIVE CORONARY ARTERY OF NATIVE HEART WITHOUT ANGINA PECTORIS: ICD-10-CM

## 2021-12-08 RX ORDER — LISINOPRIL 20 MG/1
TABLET ORAL
Qty: 90 TABLET | Refills: 3 | Status: SHIPPED | OUTPATIENT
Start: 2021-12-08 | End: 2022-12-19

## 2021-12-08 RX ORDER — TICAGRELOR 90 MG/1
TABLET ORAL
Qty: 60 TABLET | Refills: 0 | Status: SHIPPED | OUTPATIENT
Start: 2021-12-08 | End: 2021-12-17

## 2021-12-08 RX ORDER — ATORVASTATIN CALCIUM 40 MG/1
40 TABLET, FILM COATED ORAL DAILY
Qty: 90 TABLET | Refills: 3 | Status: SHIPPED | OUTPATIENT
Start: 2021-12-08 | End: 2022-12-15

## 2021-12-08 NOTE — TELEPHONE ENCOUNTER
Routing refill request to provider for review/approval because:  Labs not current:  ALT and AST  Lesley MCBRIDE RN  [facility]

## 2021-12-08 NOTE — TELEPHONE ENCOUNTER
Prescription approved per [facility] Refill Protocol.  Lesley MCBRIDE RN  [facility]

## 2021-12-09 RX ORDER — METOPROLOL TARTRATE 50 MG
50 TABLET ORAL 2 TIMES DAILY
Qty: 180 TABLET | Refills: 3 | Status: SHIPPED | OUTPATIENT
Start: 2021-12-09 | End: 2021-12-17

## 2021-12-09 NOTE — TELEPHONE ENCOUNTER
Prescription approved per [facility] Refill Protocol.    Sandra HOROWITZ RN  EP Triage

## 2021-12-17 ENCOUNTER — OFFICE VISIT (OUTPATIENT)
Dept: CARDIOLOGY | Facility: CLINIC | Age: 51
End: 2021-12-17
Payer: COMMERCIAL

## 2021-12-17 VITALS
OXYGEN SATURATION: 99 % | HEART RATE: 54 BPM | SYSTOLIC BLOOD PRESSURE: 135 MMHG | HEIGHT: 68 IN | BODY MASS INDEX: 28.95 KG/M2 | WEIGHT: 191 LBS | DIASTOLIC BLOOD PRESSURE: 84 MMHG

## 2021-12-17 DIAGNOSIS — I25.10 CORONARY ARTERY DISEASE INVOLVING NATIVE CORONARY ARTERY OF NATIVE HEART WITHOUT ANGINA PECTORIS: Primary | ICD-10-CM

## 2021-12-17 DIAGNOSIS — R07.9 CARDIAC CHEST PAIN: ICD-10-CM

## 2021-12-17 PROCEDURE — 99215 OFFICE O/P EST HI 40 MIN: CPT | Performed by: PHYSICIAN ASSISTANT

## 2021-12-17 RX ORDER — ISOSORBIDE MONONITRATE 30 MG/1
30 TABLET, EXTENDED RELEASE ORAL DAILY
Qty: 30 TABLET | Refills: 1 | Status: SHIPPED | OUTPATIENT
Start: 2021-12-17 | End: 2022-02-23

## 2021-12-17 RX ORDER — METOPROLOL TARTRATE 50 MG
25 TABLET ORAL 2 TIMES DAILY
Qty: 180 TABLET | Refills: 3 | COMMUNITY
Start: 2021-12-17 | End: 2022-04-19

## 2021-12-17 ASSESSMENT — MIFFLIN-ST. JEOR: SCORE: 1705.87

## 2021-12-17 NOTE — PATIENT INSTRUCTIONS
"Thank you for your [facility] visit today. Your provider has recommended the following:    Medication Changes:  DECREASE metoprolol to 25 mg (1/2 tablet) twice daily.  START Imdur 30 mg daily to see if that helps your chest pain  Recommendations:  We'll see how you feel with the medication changes and discuss a possible stress test vs angiogram    Reminder:  Please bring in all current medications, over the counter supplements and vitamin bottles to your next appointment.  Important \"Adalberto Cook\" telephone numbers for your reference:  Cardiology Scheduling - [phone]  Cardiology Clinic RN-  [phone]     [facility]    "

## 2021-12-17 NOTE — LETTER
12/17/2021    Physician No Ref-Primary  No address on file    RE: Candelario Stahl       Dear Colleague,     I had the pleasure of seeing Candelario Stahl in the [facility].    Cardiology Progress Note    Patient seen today in follow up of: CAD  Primary cardiologist: Dr. Salazar    HPI:  Candelario Stahl is a very pleasant 51 year old male with a history of hypertension, tobacco use, and coronary artery disease with a STEMI in March 2020.    He presented on 3/5/2020 to the emergency room and was found to have an acute ST elevation MI and third-degree heart block.  He was taken emergently to the Cath Lab where a temporary pacer was placed.  Coronary angiography showed a 100% RCA occlusion.  This was treated with 3 drug-eluting stents to the proximal and mid RCA.  He had a residual 70% stenosis in the proximal to mid LAD, a 60% lesion in the distal circumflex, and a 60% OM3 lesion.  He continued to have recurrent chest pain and inferior changes on his EKG and was brought back to the Cath Lab where he was found to have stent thrombosis of the mid RCA stent.  He underwent high-pressure balloon angioplasty.  His pacer wire was removed the following day.  His follow-up echo showed preserved LV function overall but severe hypokinesis of the basal inferior wall.     He was seen in follow-up and continued to have chest discomfort with exertion.  He ultimately underwent a staged intervention to his residual proximal LAD lesion in April.  He had PCI of the ostial and mid LAD and D1 bifurcation with rotational atherectomy and stenting with 3 drug-eluting stents using a DK crush technique.  Following that intervention, his symptoms resolved.  He had a visit with Dr. Salazar a year ago. He had been seen by his PCP and had some chest discomfort in the setting of hypertension.  His blood pressure remained elevated and lisinopril was increased to 20 mg daily.  Dr. Salazar recommended consideration a stress test if he  "had further angina despite adequate blood pressure control given his residual circumflex and OM disease.    Candelario is back today for annual follow up. He has various complaints today. He notes since his visit last year he has continued to have pain in his chest. He notes some left sided chest pain which is intermittent. It is not exertional. 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 In the past few weeks, this pain has been better.    A few months ago he had an episode of shortness of breath and feeling \"warmth\" throughout his body and heartburn. These symptoms felt similar to the symptoms with his heart attack. He did not seek medical evaluation. He has continued to have heartburn symptoms since then for the last 3 - 4 months. He has symptoms which last for hours. He has tried TUMS which helps somewhat. These symptoms have been stable and have not progressed or worsened. He also reports some new dizziness. He describes this to me as feeling like he is spinning but he had a visit with Dr. Mai in primary care and per the notes he was describing lightheadedness. He denies presyncope or syncope to me today. He feels more fatigued in general.     Of note, he had an EKG and troponin last week in clinic. His troponin was normal.     Of note, he continues to smoke.     ASSESSMENT/PLAN:  Candelario Stahl is a very pleasant 51 year old male with a history of coronary artery disease, tobacco use, and hypertension. He had a STEMI in March of 2020 and was found to have an occluded distal RCA which was treated with 3 KORTNEY. He had transient third degreed heart block. He subsequently had a staged intervention to the ostial/mid LAD and D1 bifurcation. He has moderate LCx disease which has been medically managed.     He is here today for annual follow up. He has several complaints which are somewhat difficult to sort out. He endorses intermittent chest pain for some time as well " as some shortness of breath although this does seem to be better in the last few weeks. These symptoms have not been exertional. He had an episode several months ago which is concerning for a cardiac event possibly although did not seek evaluation at that time. He now endorses heartburn symptoms and some dizziness. By description today his dizziness sounds more like vertigo although he reported lightheadedness to his PCP recently.    At this point, I suggested we trial him on Imdur 30 mg daily to see if his symptoms improve. His heart rates are in the 50s today and given his complaints of dizziness I suggested cutting back his metoprolol from 50 mg twice daily to 25 mg twice daily as well. We will see how he is feeling in a few days. I did briefly review with Dr. Salazar after his visit, and we will also plan for a nuclear stress test for further evaluation. Certainly if he has evidence of ischemia we will need to consider coronary angiography.    He remains on aspirin and Brilinta 90 mg daily. He is over a year out from his MI thus will decrease Brilinta to 60 mg daily. Will defer to Dr. Salazar how long to continue DAPT ultimately. He is on Lipitor 40 mg daily with a recent LDL of 26. His blood pressures have been stable in the 130s systolic at his recent visits on his current medications. He may benefit from further increasing his lisinopril but will defer for now in the setting of other medication changes.     He unfortunately continues to smoke. I strongly encouraged cessation today.     If he has any worsening symptoms, I encouraged him to seek evaluation in the ED. He has SL nitroglycin at home to use if needed. We will otherwise call him next week for an update. It was a pleasure seeing Candelario in clinic today.    Orders this Visit:  No orders of the defined types were placed in this encounter.    Orders Placed This Encounter   Medications     metoprolol tartrate (LOPRESSOR) 50 MG tablet     Sig: Take 0.5  tablets (25 mg) by mouth 2 times daily     Dispense:  180 tablet     Refill:  3     isosorbide mononitrate (IMDUR) 30 MG 24 hr tablet     Sig: Take 1 tablet (30 mg) by mouth daily     Dispense:  30 tablet     Refill:  1     ticagrelor (BRILINTA) 60 MG tablet     Sig: Take 1 tablet (60 mg) by mouth 2 times daily     Dispense:  180 tablet     Refill:  3     Medications Discontinued During This Encounter   Medication Reason     erythromycin (ROMYCIN) 5 MG/GM ophthalmic ointment      cetirizine (ZYRTEC) 10 MG tablet      fluticasone (FLONASE) 50 MCG/ACT nasal spray      metoprolol tartrate (LOPRESSOR) 50 MG tablet Reorder     BRILINTA 90 MG tablet        CURRENT MEDICATIONS:  Current Outpatient Medications   Medication Sig Dispense Refill     ASPIRIN ADULT LOW STRENGTH 81 MG EC tablet TAKE 1 TABLET (81 MG) BY MOUTH DAILY 100 tablet 2     atorvastatin (LIPITOR) 40 MG tablet TAKE 1 TABLET (40 MG) BY MOUTH DAILY 90 tablet 3     isosorbide mononitrate (IMDUR) 30 MG 24 hr tablet Take 1 tablet (30 mg) by mouth daily 30 tablet 1     lisinopril (ZESTRIL) 20 MG tablet TAKE 1 TABLET (20MG) BY MOUTH DAILY 90 tablet 3     metoprolol tartrate (LOPRESSOR) 50 MG tablet Take 0.5 tablets (25 mg) by mouth 2 times daily 180 tablet 3     multivitamin, therapeutic (THERA-VIT) TABS tablet Take 1 tablet by mouth Takes when remembers, usually once weekly.       nitroGLYcerin (NITROSTAT) 0.4 MG sublingual tablet For chest pain place 1 tablet under the tongue every 5 minutes for 3 doses. If symptoms persist 5 minutes after 2 nd dose call 911. 30 tablet 1     ticagrelor (BRILINTA) 60 MG tablet Take 1 tablet (60 mg) by mouth 2 times daily 180 tablet 3     ALLERGIES  No Known Allergies  PAST MEDICAL HISTORY:  Past Medical History:   Diagnosis Date     CAD (coronary artery disease)      STEMI (ST elevation myocardial infarction) (H)      Third degree heart block (H)      Tobacco abuse 3/31/2020     PAST SURGICAL HISTORY:  Past Surgical History:    Procedure Laterality Date     CV CORONARY ANGIOGRAM N/A 3/5/2020    Procedure: Coronary Angiogram;  Surgeon: Fabricio Mata MD;  Location:  HEART CARDIAC CATH LAB     CV CORONARY ANGIOGRAM N/A 4/9/2020    Procedure: Coronary Angiogram;  Surgeon: Evens Figueredo MD;  Location:  HEART CARDIAC CATH LAB     CV HEART CATHETERIZATION WITH POSSIBLE INTERVENTION N/A 3/5/2020    Procedure: Coronary Angiogram;  Surgeon: Fabricio Mata MD;  Location:  HEART CARDIAC CATH LAB     CV HEART CATHETERIZATION WITH POSSIBLE INTERVENTION N/A 3/5/2020    Procedure: Heart Catheterization with Possible Intervention;  Surgeon: Fabricio Mata MD;  Location:  HEART CARDIAC CATH LAB     CV INTRAVASULAR ULTRASOUND N/A 4/9/2020    Procedure: Intravascular Ultrasound;  Surgeon: Evens Figueredo MD;  Location:  HEART CARDIAC CATH LAB     CV LEFT HEART CATH N/A 4/9/2020    Procedure: Left Heart Cath;  Surgeon: Evens Figueredo MD;  Location:  HEART CARDIAC CATH LAB     CV PCI ATHERECTOMY ORBITAL N/A 4/9/2020    Procedure: Percutaneous Coronary Intervention Atherectomy Rotational;  Surgeon: Evens Figueredo MD;  Location:  HEART CARDIAC CATH LAB     CV PCI STENT DRUG ELUTING N/A 3/5/2020    Procedure: Percutaneous Coronary Intervention Stent Drug Eluting;  Surgeon: Fabricio Mata MD;  Location:  HEART CARDIAC CATH LAB     CV PCI STENT DRUG ELUTING N/A 4/9/2020    Procedure: Percutaneous Coronary Intervention Stent Drug Eluting;  Surgeon: Evens Figueredo MD;  Location:  HEART CARDIAC CATH LAB     CV TEMPORARY PACEMAKER INSERTION N/A 3/5/2020    Procedure: Temporary Pacemaker Insertion;  Surgeon: Fabricio Mata MD;  Location:  HEART CARDIAC CATH LAB     CV TEMPORARY PACEMAKER INSERTION N/A 3/5/2020    Procedure: Temporary Pacemaker Insertion;  Surgeon: Fabricio Mata MD;  Location:  HEART CARDIAC CATH LAB     FAMILY HISTORY:  History  "reviewed. No pertinent family history.  SOCIAL HISTORY:  Social History     Socioeconomic History     Marital status: Single     Spouse name: None     Number of children: None     Years of education: None     Highest education level: None   Occupational History     None   Tobacco Use     Smoking status: Current Every Day Smoker     Smokeless tobacco: Never Used   Substance and Sexual Activity     Alcohol use: Not Currently     Drug use: Not Currently     Sexual activity: Not Currently     Partners: Female   Other Topics Concern     Parent/sibling w/ CABG, MI or angioplasty before 65F 55M? Not Asked   Social History Narrative     None     Social Determinants of Health     Financial Resource Strain: Not on file   Food Insecurity: Not on file   Transportation Needs: Not on file   Physical Activity: Not on file   Stress: Not on file   Social Connections: Not on file   Intimate Partner Violence: Not on file   Housing Stability: Not on file     Review of Systems:  Skin:  Negative     Eyes:  Negative    ENT:  Negative    Respiratory:  Positive for dyspnea on exertion  Cardiovascular:    Positive for;dizziness;syncope or near-syncope;lightheadedness  Gastroenterology: Negative    Genitourinary:  Negative    Musculoskeletal:  Negative    Neurologic:  Negative    Psychiatric:  Negative    Heme/Lymph/Imm:  Negative    Endocrine:  Negative       Physical Exam:  Vitals: /84 (BP Location: Left arm, Cuff Size: Adult Regular)   Pulse 54   Ht 1.727 m (5' 8\")   Wt 86.6 kg (191 lb)   SpO2 99%   BMI 29.04 kg/m     Wt Readings from Last 4 Encounters:   12/17/21 86.6 kg (191 lb)   12/01/21 87.5 kg (193 lb)   03/15/21 84.4 kg (186 lb)   11/16/20 83.9 kg (185 lb)     GEN: well nourished, in no acute distress.  HEENT:  Pupils equal, round. Sclerae nonicteric.   C/V:  Regular rate and rhythm, no murmur, rub or gallop.    RESP: Respirations are unlabored. Clear to auscultation bilaterally without wheezing, rales, or rhonchi.  GI: " Abdomen soft, nontender.  EXTREM: no LE edema.  NEURO: Alert and oriented, cooperative.  SKIN: Warm and dry.     Recent Lab Results:  LIPID RESULTS:  Lab Results   Component Value Date    CHOL 110 12/01/2021    CHOL 92 11/30/2020    HDL 32 (L) 12/01/2021    HDL 31 (L) 11/30/2020    LDL 26 12/01/2021    LDL 45 11/30/2020    TRIG 262 (H) 12/01/2021    TRIG 78 11/30/2020     LIVER ENZYME RESULTS:  Lab Results   Component Value Date    AST 20 11/16/2020    ALT 51 11/30/2020     CBC RESULTS:  Lab Results   Component Value Date    WBC 11.1 (H) 12/01/2021    WBC 8.7 04/09/2020    RBC 5.79 12/01/2021    RBC 5.17 04/09/2020    HGB 15.9 12/01/2021    HGB 14.3 04/09/2020    HCT 48.0 12/01/2021    HCT 43.7 04/09/2020    MCV 83 12/01/2021    MCV 85 04/09/2020    MCH 27.5 12/01/2021    MCH 27.7 04/09/2020    MCHC 33.1 12/01/2021    MCHC 32.7 04/09/2020    RDW 13.4 12/01/2021    RDW 13.2 04/09/2020     12/01/2021     04/09/2020     BMP RESULTS:  Lab Results   Component Value Date     12/01/2021     11/16/2020    POTASSIUM 4.8 12/01/2021    POTASSIUM 3.9 11/16/2020    CHLORIDE 108 12/01/2021    CHLORIDE 107 11/16/2020    CO2 30 12/01/2021    CO2 26 11/16/2020    ANIONGAP <1 (L) 12/01/2021    ANIONGAP 5 11/16/2020     (H) 12/01/2021    GLC 88 11/16/2020    BUN 20 12/01/2021    BUN 17 11/16/2020    CR 1.02 12/01/2021    CR 0.96 11/16/2020    GFRESTIMATED 86 12/01/2021    GFRESTIMATED >90 11/16/2020    GFRESTBLACK >90 11/16/2020    RINA 8.8 12/01/2021    RINA 8.9 11/16/2020      A1C RESULTS:  Lab Results   Component Value Date    A1C 5.7 (H) 03/06/2020     INR RESULTS:  Lab Results   Component Value Date    INR 1.02 04/09/2020    INR 1.08 03/05/2020     Total time today was 45 minutes review notes, imaging, labs, patient visit, orders and documentation.     Radha Gallardo PA-C  [facility]      Thank you for allowing me to participate in the care of your patient.      Sincerely,     Radha Gallardo PA-C      [facility]  cc:   No referring provider defined for this encounter.

## 2021-12-17 NOTE — PROGRESS NOTES
Cardiology Progress Note    Patient seen today in follow up of: CAD  Primary cardiologist: Dr. Salazar    HPI:  Candelario Stahl is a very pleasant 51 year old male with a history of hypertension, tobacco use, and coronary artery disease with a STEMI in March 2020.    He presented on 3/5/2020 to the emergency room and was found to have an acute ST elevation MI and third-degree heart block.  He was taken emergently to the Cath Lab where a temporary pacer was placed.  Coronary angiography showed a 100% RCA occlusion.  This was treated with 3 drug-eluting stents to the proximal and mid RCA.  He had a residual 70% stenosis in the proximal to mid LAD, a 60% lesion in the distal circumflex, and a 60% OM3 lesion.  He continued to have recurrent chest pain and inferior changes on his EKG and was brought back to the Cath Lab where he was found to have stent thrombosis of the mid RCA stent.  He underwent high-pressure balloon angioplasty.  His pacer wire was removed the following day.  His follow-up echo showed preserved LV function overall but severe hypokinesis of the basal inferior wall.     He was seen in follow-up and continued to have chest discomfort with exertion.  He ultimately underwent a staged intervention to his residual proximal LAD lesion in April.  He had PCI of the ostial and mid LAD and D1 bifurcation with rotational atherectomy and stenting with 3 drug-eluting stents using a DK crush technique.  Following that intervention, his symptoms resolved.  He had a visit with Dr. Salazar a year ago. He had been seen by his PCP and had some chest discomfort in the setting of hypertension.  His blood pressure remained elevated and lisinopril was increased to 20 mg daily.  Dr. Salazar recommended consideration a stress test if he had further angina despite adequate blood pressure control given his residual circumflex and OM disease.    Candelario is back today for annual follow up. He has various complaints today. He notes  "since his visit last year he has continued to have pain in his chest. He notes some left sided chest pain which is intermittent. It is not exertional. 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 In the past few weeks, this pain has been better.    A few months ago he had an episode of shortness of breath and feeling \"warmth\" throughout his body and heartburn. These symptoms felt similar to the symptoms with his heart attack. He did not seek medical evaluation. He has continued to have heartburn symptoms since then for the last 3 - 4 months. He has symptoms which last for hours. He has tried TUMS which helps somewhat. These symptoms have been stable and have not progressed or worsened. He also reports some new dizziness. He describes this to me as feeling like he is spinning but he had a visit with Dr. Mai in primary care and per the notes he was describing lightheadedness. He denies presyncope or syncope to me today. He feels more fatigued in general.     Of note, he had an EKG and troponin last week in clinic. His troponin was normal.     Of note, he continues to smoke.     ASSESSMENT/PLAN:  Candelario Stahl is a very pleasant 51 year old male with a history of coronary artery disease, tobacco use, and hypertension. He had a STEMI in March of 2020 and was found to have an occluded distal RCA which was treated with 3 KORTNEY. He had transient third degreed heart block. He subsequently had a staged intervention to the ostial/mid LAD and D1 bifurcation. He has moderate LCx disease which has been medically managed.     He is here today for annual follow up. He has several complaints which are somewhat difficult to sort out. He endorses intermittent chest pain for some time as well as some shortness of breath although this does seem to be better in the last few weeks. These symptoms have not been exertional. He had an episode several months ago which is concerning for a " cardiac event possibly although did not seek evaluation at that time. He now endorses heartburn symptoms and some dizziness. By description today his dizziness sounds more like vertigo although he reported lightheadedness to his PCP recently.    At this point, I suggested we trial him on Imdur 30 mg daily to see if his symptoms improve. His heart rates are in the 50s today and given his complaints of dizziness I suggested cutting back his metoprolol from 50 mg twice daily to 25 mg twice daily as well. We will see how he is feeling in a few days. I did briefly review with Dr. Salazar after his visit, and we will also plan for a nuclear stress test for further evaluation. Certainly if he has evidence of ischemia we will need to consider coronary angiography.    He remains on aspirin and Brilinta 90 mg daily. He is over a year out from his MI thus will decrease Brilinta to 60 mg daily. Will defer to Dr. Salazar how long to continue DAPT ultimately. He is on Lipitor 40 mg daily with a recent LDL of 26. His blood pressures have been stable in the 130s systolic at his recent visits on his current medications. He may benefit from further increasing his lisinopril but will defer for now in the setting of other medication changes.     He unfortunately continues to smoke. I strongly encouraged cessation today.     If he has any worsening symptoms, I encouraged him to seek evaluation in the ED. He has SL nitroglycin at home to use if needed. We will otherwise call him next week for an update. It was a pleasure seeing Candelario in clinic today.    Orders this Visit:  No orders of the defined types were placed in this encounter.    Orders Placed This Encounter   Medications     metoprolol tartrate (LOPRESSOR) 50 MG tablet     Sig: Take 0.5 tablets (25 mg) by mouth 2 times daily     Dispense:  180 tablet     Refill:  3     isosorbide mononitrate (IMDUR) 30 MG 24 hr tablet     Sig: Take 1 tablet (30 mg) by mouth daily     Dispense:  30  tablet     Refill:  1     ticagrelor (BRILINTA) 60 MG tablet     Sig: Take 1 tablet (60 mg) by mouth 2 times daily     Dispense:  180 tablet     Refill:  3     Medications Discontinued During This Encounter   Medication Reason     erythromycin (ROMYCIN) 5 MG/GM ophthalmic ointment      cetirizine (ZYRTEC) 10 MG tablet      fluticasone (FLONASE) 50 MCG/ACT nasal spray      metoprolol tartrate (LOPRESSOR) 50 MG tablet Reorder     BRILINTA 90 MG tablet        CURRENT MEDICATIONS:  Current Outpatient Medications   Medication Sig Dispense Refill     ASPIRIN ADULT LOW STRENGTH 81 MG EC tablet TAKE 1 TABLET (81 MG) BY MOUTH DAILY 100 tablet 2     atorvastatin (LIPITOR) 40 MG tablet TAKE 1 TABLET (40 MG) BY MOUTH DAILY 90 tablet 3     isosorbide mononitrate (IMDUR) 30 MG 24 hr tablet Take 1 tablet (30 mg) by mouth daily 30 tablet 1     lisinopril (ZESTRIL) 20 MG tablet TAKE 1 TABLET (20MG) BY MOUTH DAILY 90 tablet 3     metoprolol tartrate (LOPRESSOR) 50 MG tablet Take 0.5 tablets (25 mg) by mouth 2 times daily 180 tablet 3     multivitamin, therapeutic (THERA-VIT) TABS tablet Take 1 tablet by mouth Takes when remembers, usually once weekly.       nitroGLYcerin (NITROSTAT) 0.4 MG sublingual tablet For chest pain place 1 tablet under the tongue every 5 minutes for 3 doses. If symptoms persist 5 minutes after 2 nd dose call 911. 30 tablet 1     ticagrelor (BRILINTA) 60 MG tablet Take 1 tablet (60 mg) by mouth 2 times daily 180 tablet 3     ALLERGIES  No Known Allergies  PAST MEDICAL HISTORY:  Past Medical History:   Diagnosis Date     CAD (coronary artery disease)      STEMI (ST elevation myocardial infarction) (H)      Third degree heart block (H)      Tobacco abuse 3/31/2020     PAST SURGICAL HISTORY:  Past Surgical History:   Procedure Laterality Date     CV CORONARY ANGIOGRAM N/A 3/5/2020    Procedure: Coronary Angiogram;  Surgeon: Fabricio Mata MD;  Location: [facility] CARDIAC CATH LAB     CV CORONARY ANGIOGRAM  N/A 4/9/2020    Procedure: Coronary Angiogram;  Surgeon: Evens Figueredo MD;  Location:  HEART CARDIAC CATH LAB     CV HEART CATHETERIZATION WITH POSSIBLE INTERVENTION N/A 3/5/2020    Procedure: Coronary Angiogram;  Surgeon: Fabricio Mata MD;  Location:  HEART CARDIAC CATH LAB     CV HEART CATHETERIZATION WITH POSSIBLE INTERVENTION N/A 3/5/2020    Procedure: Heart Catheterization with Possible Intervention;  Surgeon: Fabricio Mata MD;  Location:  HEART CARDIAC CATH LAB     CV INTRAVASULAR ULTRASOUND N/A 4/9/2020    Procedure: Intravascular Ultrasound;  Surgeon: Evens Figueredo MD;  Location:  HEART CARDIAC CATH LAB     CV LEFT HEART CATH N/A 4/9/2020    Procedure: Left Heart Cath;  Surgeon: Evens Figueredo MD;  Location:  HEART CARDIAC CATH LAB     CV PCI ATHERECTOMY ORBITAL N/A 4/9/2020    Procedure: Percutaneous Coronary Intervention Atherectomy Rotational;  Surgeon: Evens Figueredo MD;  Location:  HEART CARDIAC CATH LAB     CV PCI STENT DRUG ELUTING N/A 3/5/2020    Procedure: Percutaneous Coronary Intervention Stent Drug Eluting;  Surgeon: Fabricio Mata MD;  Location:  HEART CARDIAC CATH LAB     CV PCI STENT DRUG ELUTING N/A 4/9/2020    Procedure: Percutaneous Coronary Intervention Stent Drug Eluting;  Surgeon: Evens Figueredo MD;  Location:  HEART CARDIAC CATH LAB     CV TEMPORARY PACEMAKER INSERTION N/A 3/5/2020    Procedure: Temporary Pacemaker Insertion;  Surgeon: Fabricio Mata MD;  Location:  HEART CARDIAC CATH LAB     CV TEMPORARY PACEMAKER INSERTION N/A 3/5/2020    Procedure: Temporary Pacemaker Insertion;  Surgeon: Fabricio Mata MD;  Location:  HEART CARDIAC CATH LAB     FAMILY HISTORY:  History reviewed. No pertinent family history.  SOCIAL HISTORY:  Social History     Socioeconomic History     Marital status: Single     Spouse name: None     Number of children: None     Years of education: None      "Highest education level: None   Occupational History     None   Tobacco Use     Smoking status: Current Every Day Smoker     Smokeless tobacco: Never Used   Substance and Sexual Activity     Alcohol use: Not Currently     Drug use: Not Currently     Sexual activity: Not Currently     Partners: Female   Other Topics Concern     Parent/sibling w/ CABG, MI or angioplasty before 65F 55M? Not Asked   Social History Narrative     None     Social Determinants of Health     Financial Resource Strain: Not on file   Food Insecurity: Not on file   Transportation Needs: Not on file   Physical Activity: Not on file   Stress: Not on file   Social Connections: Not on file   Intimate Partner Violence: Not on file   Housing Stability: Not on file     Review of Systems:  Skin:  Negative     Eyes:  Negative    ENT:  Negative    Respiratory:  Positive for dyspnea on exertion  Cardiovascular:    Positive for;dizziness;syncope or near-syncope;lightheadedness  Gastroenterology: Negative    Genitourinary:  Negative    Musculoskeletal:  Negative    Neurologic:  Negative    Psychiatric:  Negative    Heme/Lymph/Imm:  Negative    Endocrine:  Negative       Physical Exam:  Vitals: /84 (BP Location: Left arm, Cuff Size: Adult Regular)   Pulse 54   Ht 1.727 m (5' 8\")   Wt 86.6 kg (191 lb)   SpO2 99%   BMI 29.04 kg/m     Wt Readings from Last 4 Encounters:   12/17/21 86.6 kg (191 lb)   12/01/21 87.5 kg (193 lb)   03/15/21 84.4 kg (186 lb)   11/16/20 83.9 kg (185 lb)     GEN: well nourished, in no acute distress.  HEENT:  Pupils equal, round. Sclerae nonicteric.   C/V:  Regular rate and rhythm, no murmur, rub or gallop.    RESP: Respirations are unlabored. Clear to auscultation bilaterally without wheezing, rales, or rhonchi.  GI: Abdomen soft, nontender.  EXTREM: no LE edema.  NEURO: Alert and oriented, cooperative.  SKIN: Warm and dry.     Recent Lab Results:  LIPID RESULTS:  Lab Results   Component Value Date    CHOL 110 12/01/2021    " CHOL 92 11/30/2020    HDL 32 (L) 12/01/2021    HDL 31 (L) 11/30/2020    LDL 26 12/01/2021    LDL 45 11/30/2020    TRIG 262 (H) 12/01/2021    TRIG 78 11/30/2020     LIVER ENZYME RESULTS:  Lab Results   Component Value Date    AST 20 11/16/2020    ALT 51 11/30/2020     CBC RESULTS:  Lab Results   Component Value Date    WBC 11.1 (H) 12/01/2021    WBC 8.7 04/09/2020    RBC 5.79 12/01/2021    RBC 5.17 04/09/2020    HGB 15.9 12/01/2021    HGB 14.3 04/09/2020    HCT 48.0 12/01/2021    HCT 43.7 04/09/2020    MCV 83 12/01/2021    MCV 85 04/09/2020    MCH 27.5 12/01/2021    MCH 27.7 04/09/2020    MCHC 33.1 12/01/2021    MCHC 32.7 04/09/2020    RDW 13.4 12/01/2021    RDW 13.2 04/09/2020     12/01/2021     04/09/2020     BMP RESULTS:  Lab Results   Component Value Date     12/01/2021     11/16/2020    POTASSIUM 4.8 12/01/2021    POTASSIUM 3.9 11/16/2020    CHLORIDE 108 12/01/2021    CHLORIDE 107 11/16/2020    CO2 30 12/01/2021    CO2 26 11/16/2020    ANIONGAP <1 (L) 12/01/2021    ANIONGAP 5 11/16/2020     (H) 12/01/2021    GLC 88 11/16/2020    BUN 20 12/01/2021    BUN 17 11/16/2020    CR 1.02 12/01/2021    CR 0.96 11/16/2020    GFRESTIMATED 86 12/01/2021    GFRESTIMATED >90 11/16/2020    GFRESTBLACK >90 11/16/2020    RINA 8.8 12/01/2021    RINA 8.9 11/16/2020      A1C RESULTS:  Lab Results   Component Value Date    A1C 5.7 (H) 03/06/2020     INR RESULTS:  Lab Results   Component Value Date    INR 1.02 04/09/2020    INR 1.08 03/05/2020     Total time today was 45 minutes review notes, imaging, labs, patient visit, orders and documentation.     Radha Gallardo PA-C  [facility]

## 2021-12-21 ENCOUNTER — TELEPHONE (OUTPATIENT)
Dept: CARDIOLOGY | Facility: CLINIC | Age: 51
End: 2021-12-21
Payer: COMMERCIAL

## 2021-12-21 NOTE — CONFIDENTIAL NOTE
Appreciate the update. I think we should send him for a stress test but we can see how he is doing next week. Rebecca

## 2021-12-21 NOTE — TELEPHONE ENCOUNTER
Appreciate the update Suyapa. I think we should send him for a stress test regardless given his coronary history. I'll put orders in. I briefly reviewed with Dr. Salazar today as well. Thanks. AL

## 2021-12-21 NOTE — TELEPHONE ENCOUNTER
Call to patient. Radha Gallardo PA-C attempted to call Candelario 12/20/2021 to see how he was feeling after visit last week. He had various complaints including chest pain and dizziness and Rebecca asked him to call us and let us know. Rebecca is available to call him back as well if there is a time that works for him to discuss.     Feeling a little better, but Candelario reports just started the pills today. Seen by Rebecca 12/17/21. Reviewed medication list with Candelario and he reports taking all of the pills, but will need further review and reinforcement. Candelario says he would like to see how the pills are doing this week and will contact Rebecca next week if he has any concerns. Update to OTONIEL.  Suyapa Zarco RN 12/21/21 11:05 AM

## 2021-12-21 NOTE — TELEPHONE ENCOUNTER
Call to patient to set up stress test at request of Rebecca Gallardo PA-C. Left message to call back to schedule.  Suyapa Zarco RN 12/21/21 3:52 PM

## 2022-01-08 ENCOUNTER — HEALTH MAINTENANCE LETTER (OUTPATIENT)
Age: 52
End: 2022-01-08

## 2022-02-23 ENCOUNTER — TELEPHONE (OUTPATIENT)
Dept: CARDIOLOGY | Facility: CLINIC | Age: 52
End: 2022-02-23
Payer: COMMERCIAL

## 2022-02-23 DIAGNOSIS — I25.10 CORONARY ARTERY DISEASE INVOLVING NATIVE CORONARY ARTERY OF NATIVE HEART WITHOUT ANGINA PECTORIS: ICD-10-CM

## 2022-02-23 RX ORDER — ISOSORBIDE MONONITRATE 30 MG/1
30 TABLET, EXTENDED RELEASE ORAL DAILY
Qty: 30 TABLET | Refills: 0 | Status: SHIPPED | OUTPATIENT
Start: 2022-02-23 | End: 2022-02-24

## 2022-02-23 NOTE — TELEPHONE ENCOUNTER
Phone and fax request from: Call center pharmacy  Medication requested: imdur  Last Office visit with: Angelica BURNS   Follow up scheduled:  - due for stress test.  [facility] Cardiology Refill Guideline reviewed.   Medication meets criteria for refill.   30 day supply to not interrupt important medication - Message to pharmacist for patient to call to set up testing.Multiple calls attempted to patient.  Refill sent.  .Suyapa Zarco RN 02/23/22 3:59 PM

## 2022-02-23 NOTE — TELEPHONE ENCOUNTER
[facility] Call Center    Phone Message    May a detailed message be left on voicemail: yes     Reason for Call: Medication Refill Request    Has the patient contacted the pharmacy for the refill? Yes   Name of medication being requested: isosorbide mononitrate (IMDUR) 30 MG 24 hr tablet  Provider who prescribed the medication: WILMAR Gallardo  [place] [facility] [id number]  Date medication is needed: ASAP, pt is out. Pharmacy states they have sent refill request multiple times         Action Taken: Message routed to:  Other: [facility]    Travel Screening: Not Applicable

## 2022-02-24 DIAGNOSIS — I25.10 CORONARY ARTERY DISEASE INVOLVING NATIVE CORONARY ARTERY OF NATIVE HEART WITHOUT ANGINA PECTORIS: ICD-10-CM

## 2022-02-24 RX ORDER — ISOSORBIDE MONONITRATE 30 MG/1
30 TABLET, EXTENDED RELEASE ORAL DAILY
Qty: 90 TABLET | Refills: 3 | Status: SHIPPED | OUTPATIENT
Start: 2022-02-24 | End: 2022-04-19

## 2022-02-24 NOTE — TELEPHONE ENCOUNTER
Radha Gallardo PA-C McMahon, Bullis Mary, RN  Yes Suyapa, please fill his Imdur. I agree, a year Rx is fine. Hopefully we can get in touch with him to set up his stress test or at least another clinic visit to revisit his symptoms. Thanks. Rebecca   =========================    Called to Candelario - no answer - left message to set up stress test and/or visit.  Messaged to scheduling to set up stress test as ordered and follow up OR follow up to discuss symptoms if does not want to do the stress test.  Rx filled for one year for imdur.  Suyapa Zarco RN 02/24/22 9:35 AM

## 2022-03-02 NOTE — Clinical Note
304
348
348
364
409
411
457, redrawing
Atherectomy performed in the proximal left anterior descending. Pass duration = 20 seconds. 
Atherectomy performed in the proximal left anterior descending. Pass duration = 20 seconds. 
Atherectomy performed in the proximal left anterior descending. Rotational atherectomy was performed. Pass duration = 20 seconds. 
Atherectomy performed in the proximal left anterior descending. Rotational atherectomy was performed. Pass duration = 20 seconds. 
Atherectomy removed over the wire.  
Balloon inserted to left anterior descending and left anterior descending diagonal.
Balloon inserted to left anterior descending and left anterior descending diagonal.
Balloon inserted to left anterior descending and proximal left anterior descending.
Balloon inserted to left anterior descending and proximal left anterior descending.
Balloon inserted to other vessel.
Balloon prepped per 's instructions.  
Balloon removed intact.  
Catheter removed over wire.  
Family has been updated.  
Guide removed intact.  
IVUS Removed
LCA Cine(s)  injected
Lab results reviewed and abnormals discussed with physician.  
Max pressure = 14 alex. Total duration = 15 seconds.     Max pressure = 14 alex. Total duration = 30 seconds.    Balloon reinflated a second time: Max pressure = 14 alex. Total duration = 30 seconds.  
Max pressure = 16 alex. Total duration = 10 seconds.     Max pressure = 16 alex. Total duration = 10 seconds.    Balloon reinflated a second time: Max pressure = 16 alex. Total duration = 10 seconds.  
Max pressure = 6 alex. Total duration = 15 seconds.     Diag 2.5x 15 E /  LAD 4.0x 8NC.  
Patient education provided.   
Potential access sites were evaluated for patency using ultrasound.   The right radial artery was selected. Access was obtained under with Sonosite guidance using a standard 18 guage needle with direct visualization of needle entry.     
Prepped and Inserted.  
R-Band utilized for closure of sight.  ; hemostasis achieved.  
RCA Cine(s)  injected utilizing power injector system.
Removed intact  
Removed intact  Both Wires Removed
Sheath prepped and inserted in the right radial artery.  
Stent deployed in the diagonal. Max pressure = 8 alex. Total duration = 30 seconds. Balloon reinflated a second time: Max pressure = 16 alex. Total duration = 25 seconds. Balloon reinflated a third time: Max pressure = 18 alex. Total duration = 15 seconds. 
Stent deployed in the ostium left anterior descending. 
Stent deployed in the ostium left anterior descending. Max pressure = 12 alex. Total duration = 22 seconds. Balloon reinflated a second time: Max pressure = 10 alex. Total duration = 24 seconds. 
Stent deployed in the proximal left anterior descending. Max pressure = 12 alex. Total duration = 20 seconds. Balloon reinflated a second time: Max pressure = 18 alex. Total duration = 20 seconds. 
Stent inserted over the wire.   
Stent inserted over the wire.   Diag
Stent prepped per 's instructions.  
Stent removed and unable to cross.   
The first balloon was inserted into the left anterior descending and ostium left anterior descending diagonal.Max pressure = 12 alex. Total duration = 45 seconds.     The second balloon was inserted into the Left Anterior Descending and proximal left anterior descending. Max pressure = 12 alex. Total duration = 45 seconds.   Max pressure = 12 alex. Total duration = 45 seconds.  
The first balloon was inserted into the left anterior descending and proximal left anterior descending.Max pressure = 14 alex. Total duration = 20 seconds.      
The first balloon was inserted into the left anterior descending and proximal left anterior descending.Max pressure = 18 alex. Total duration = 32 seconds.      
The first balloon was inserted into the left anterior descending and proximal left anterior descending.Max pressure = 6 alex. Total duration = 30 seconds.      
The first balloon was inserted into the left anterior descending.Max pressure = 12 alex. Total duration = 20 seconds.      
The first balloon was inserted into the left anterior descending.Max pressure = 12 alex. Total duration = 20 seconds.     Max pressure = 12 alex. Total duration = 40 seconds.    Balloon reinflated a second time: Max pressure = 12 alex. Total duration = 40 seconds.  
The first balloon was inserted into the left anterior descending.Max pressure = 18 alex. Total duration = 25 seconds.     Max pressure = 18 alex. Total duration = 25 seconds.    Balloon reinflated a second time: Max pressure = 18 alex. Total duration = 25 seconds.  
The first balloon was inserted into the left anterior descending.Max pressure = 4 alex. Total duration = 60 seconds.     Max pressure = 12 alex. Total duration = 20 seconds.  
The first balloon was inserted into the left anterior descending.Max pressure = 8 alex. Total duration = 25 seconds.      
There were no immediate complications during the procedure. 
Total Rotablator time 90 seconds. 
Total contrast volume (ml) 200  
bedside
left atrial appendage Cine(s)  injected utilizing power injector system.
59

## 2022-04-12 ENCOUNTER — HOSPITAL ENCOUNTER (OUTPATIENT)
Dept: CARDIOLOGY | Facility: CLINIC | Age: 52
Discharge: HOME | End: 2022-04-12
Attending: PHYSICIAN ASSISTANT
Payer: COMMERCIAL

## 2022-04-12 VITALS
HEIGHT: 68 IN | OXYGEN SATURATION: 97 % | SYSTOLIC BLOOD PRESSURE: 140 MMHG | DIASTOLIC BLOOD PRESSURE: 82 MMHG | BODY MASS INDEX: 28.76 KG/M2 | WEIGHT: 189.8 LBS

## 2022-04-12 DIAGNOSIS — I25.10 CORONARY ARTERY DISEASE INVOLVING NATIVE CORONARY ARTERY OF NATIVE HEART WITHOUT ANGINA PECTORIS: ICD-10-CM

## 2022-04-12 LAB
CV STRESS MAX HR HE: 151
NUC STRESS EJECTION FRACTION: 61 %
RATE PRESSURE PRODUCT: NORMAL
STRESS ANGINA INDEX: 0
STRESS DUKE TREADMILL SCORE: 4
STRESS ECHO BASELINE DIASTOLIC HE: 80
STRESS ECHO BASELINE HR: 70 BPM
STRESS ECHO BASELINE SYSTOLIC BP: 148
STRESS ECHO CALCULATED PERCENT HR: 88 %
STRESS ECHO LAST STRESS DIASTOLIC BP: 84
STRESS ECHO LAST STRESS SYSTOLIC BP: 194
STRESS ECHO POST ESTIMATED WORKLOAD: 10.1 METS
STRESS ECHO POST EXERCISE DUR MIN: 9 MIN
STRESS ECHO POST EXERCISE DUR SEC: 0 SEC
STRESS ECHO TARGET HR: 171
STRESS ST DEPRESSION: 1 MM

## 2022-04-12 PROCEDURE — A9502 TC99M TETROFOSMIN: HCPCS | Performed by: PHYSICIAN ASSISTANT

## 2022-04-12 PROCEDURE — 78452 HT MUSCLE IMAGE SPECT MULT: CPT | Mod: 26 | Performed by: INTERNAL MEDICINE

## 2022-04-12 PROCEDURE — 93018 CV STRESS TEST I&R ONLY: CPT | Performed by: INTERNAL MEDICINE

## 2022-04-12 PROCEDURE — 93017 CV STRESS TEST TRACING ONLY: CPT

## 2022-04-12 PROCEDURE — 93016 CV STRESS TEST SUPVJ ONLY: CPT | Performed by: INTERNAL MEDICINE

## 2022-04-12 PROCEDURE — 343N000001 HC RX 343: Performed by: PHYSICIAN ASSISTANT

## 2022-04-12 RX ADMIN — TETROFOSMIN 9.8 MCI.: 1.38 INJECTION, POWDER, LYOPHILIZED, FOR SOLUTION INTRAVENOUS at 11:25

## 2022-04-12 RX ADMIN — TETROFOSMIN 3.53 MCI.: 1.38 INJECTION, POWDER, LYOPHILIZED, FOR SOLUTION INTRAVENOUS at 09:40

## 2022-04-13 ENCOUNTER — CARE COORDINATION (OUTPATIENT)
Dept: CARDIOLOGY | Facility: CLINIC | Age: 52
End: 2022-04-13
Payer: COMMERCIAL

## 2022-04-13 NOTE — PROGRESS NOTES
Ruma Mckay RN Fanola, Christina, MD  Cc: Haroldo Bonner RN  Routing to Dr. Salazar and Rebecca Gallardo [facility]'s general cardiology nursing team in Rebecca's absence.                 NM Exercise stress test (nuc card)  Order: [id number]   Status: Final result     Visible to patient: Yes (not seen)     Dx: Coronary artery disease involving zach...     1 Result Note    Details    Reading Physician Reading Date Result Priority   Kyra Gale, DO  [phone] 4/12/2022 Routine   Kyra Gale, DO  [phone] 4/12/2022      Result Text        There is no prior study for comparison.     The nuclear stress test is abnormal. There is nontransmural infarction in the mid to basal inferolateral segment(s) of the left ventricle associated with a mild degree of anne-infarct ischemia. The left ventricular ejection fraction at stress is 61%. Left ventricular function is normal.        =============    Called to patient - left message to call back to discuss results.  Suyapa Zarco RN 04/13/22 4:41 PM    Future Appointments   Date Time Provider Department Center   4/19/2022  4:30 PM Radha Gallardo PA-C [facility]     ====================  Thank you. Only mild ischemia, mainly infarct pattern. No need to change anything prior to OPV with Rebecca.   Dr. Salazar

## 2022-04-19 ENCOUNTER — OFFICE VISIT (OUTPATIENT)
Dept: CARDIOLOGY | Facility: CLINIC | Age: 52
End: 2022-04-19
Attending: PHYSICIAN ASSISTANT
Payer: COMMERCIAL

## 2022-04-19 VITALS
BODY MASS INDEX: 29.12 KG/M2 | SYSTOLIC BLOOD PRESSURE: 144 MMHG | OXYGEN SATURATION: 99 % | HEIGHT: 68 IN | DIASTOLIC BLOOD PRESSURE: 89 MMHG | WEIGHT: 192.1 LBS | HEART RATE: 64 BPM

## 2022-04-19 DIAGNOSIS — I25.10 CORONARY ARTERY DISEASE INVOLVING NATIVE CORONARY ARTERY OF NATIVE HEART WITHOUT ANGINA PECTORIS: Primary | ICD-10-CM

## 2022-04-19 DIAGNOSIS — R07.9 CARDIAC CHEST PAIN: ICD-10-CM

## 2022-04-19 PROCEDURE — 99213 OFFICE O/P EST LOW 20 MIN: CPT | Performed by: PHYSICIAN ASSISTANT

## 2022-04-19 RX ORDER — ISOSORBIDE MONONITRATE 30 MG/1
30 TABLET, EXTENDED RELEASE ORAL DAILY
Qty: 90 TABLET | Refills: 3 | Status: SHIPPED | OUTPATIENT
Start: 2022-04-19 | End: 2022-12-19

## 2022-04-19 RX ORDER — METOPROLOL TARTRATE 25 MG/1
25 TABLET, FILM COATED ORAL 2 TIMES DAILY
Qty: 180 TABLET | Refills: 3 | Status: SHIPPED | OUTPATIENT
Start: 2022-04-19 | End: 2022-12-19

## 2022-04-19 NOTE — LETTER
4/19/2022    Physician No Ref-Primary  No address on file    RE: Candelario Stahl       Dear Colleague,     I had the pleasure of seeing Candelario Stahl in the [facility].    Cardiology Progress Note    Patient seen today in follow up of: CAD  Primary cardiologist: Dr. Salazar    HPI:  Candelario Stahl is a very pleasant 51 year old male with a history of  hypertension, tobacco use, and coronary artery disease with a STEMI in March 2020.    He presented on 3/5/2020 to the emergency room and was found to have an acute ST elevation MI and third-degree heart block.  He was taken emergently to the Cath Lab where a temporary pacer was placed.  Coronary angiography showed a 100% RCA occlusion.  This was treated with 3 drug-eluting stents to the proximal and mid RCA.  He had a residual 70% stenosis in the proximal to mid LAD, a 60% lesion in the distal circumflex, and a 60% OM3 lesion.  He continued to have recurrent chest pain and inferior changes on his EKG and was brought back to the Cath Lab where he was found to have stent thrombosis of the mid RCA stent.  He underwent high-pressure balloon angioplasty.  His pacer wire was removed the following day.  His follow-up echo showed preserved LV function overall but severe hypokinesis of the basal inferior wall.      He was seen in follow-up and continued to have chest discomfort with exertion.  He ultimately underwent a staged intervention to his residual proximal LAD lesion in April.  He had PCI of the ostial and mid LAD and D1 bifurcation with rotational atherectomy and stenting with 3 drug-eluting stents using a DK crush technique.  Following that intervention, his symptoms resolved.  He was seen in follow-up by Dr. Salazar in 2020.  Prior to that, he been seen by his primary for some chest discomfort in the setting of hypertension.  His lisinopril was increased to 20 mg daily. Dr. Salazar recommended consideration a stress test if he had further angina.   A follow-up in 6 months was recommended.    I subsequently saw Candelario in December 2021.  At that visit, he had several complaints including some warmth in his chest and heartburn.  He felt the symptoms were similar to what he experienced prior to his heart attack.  He was also having some dizziness and fatigue.  I started him on Imdur and cut back his metoprolol from 50 mg twice daily to 25 mg twice daily.  I also ordered a Lexiscan stress test.    Candelario is back today for overdue follow-up.  He did have a stress test done on 4/12/2022.  He exercised for 9 minutes.  He had a borderline hypertensive response to exercise.  He had 1 mm upsloping ST segment depression in the inferolateral leads.  Nuclear stress imaging showed a nontransmural infarct in the mid to basal inferolateral segment of the LV associated with a mild degree of anne-infarct ischemia.  His EF was normal.    He is back today in follow-up of those results.  He tells me he is feeling markedly better than when I saw him in December.  His fatigue and dizziness have essentially resolved.  Additionally, he has not had any further chest discomfort, heartburn or the warmth he was experiencing.  He has been off of Imdur for a few weeks as he tells me his pharmacy would not fill it.  Blood pressures have been in the 130s typically at home when he has been monitoring.  He denies shortness of breath.  No presyncope or syncope.    Unfortunately, he continues to smoke about a half a pack a day.  He tells me he plans to work on cessation this summer.    ASSESSMENT/PLAN:  Candelario Stahl is a delightful 51-year-old male with a history of hypertension, ongoing tobacco use, and coronary artery disease with a STEMI in March of 2020. As noted, he underwent stenting of the proximal to mid LAD at that time with 3 drug-eluting stents.  He continued to have angina and underwent a staged intervention to his proximal LAD and D1 with an additional 3 drug-eluting stents.  He  has residual moderate disease in his circumflex.    Fortunately, Candelario appears to be doing well from a cardiovascular perspective today.  I suspect he was having some symptoms from his beta-blocker including fatigue and possibly some dizziness.  This is better on the lower dose at 25 mg twice daily and we will continue that for now.      We reviewed his recent stress test which showed a nontransmural infarct in the mid to basal inferolateral segment of his LV consistent with his known prior MI.  There was mild ischemia noted.  Today, he is free of any anginal complaints.  Given he is doing well, we will continue with medical management.  He has been off of his Imdur for a few weeks as he tells me the pharmacy would not fill it.  I sent in a new prescription and we will continue that for now.  If he develops any new chest discomfort or shortness of breath, I asked him to let me know right away.  He will continue on aspirin and Brilinta at 60 mg twice daily.  We are planning on long-term dual antiplatelet therapy given his extensive stenting as long as he tolerates.  He is on Lipitor 40 mg daily and LDL is been very well controlled at 26 on last check.    He is mildly hypertensive in clinic today.  As noted, will resume his Imdur.  He has at times admitted to not taking his blood pressure medications as when he is tracked his blood pressures are well controlled.  I discussed the importance of compliance and encouraged him to take his medications as prescribed on a daily basis and he was agreeable to that.  His lisinopril could certainly be titrated up if needed but for now I asked him to continue to monitor his blood pressures at home and call if they are persistently elevated.    He is continuing to smoke about a half a pack a day.  I again stressed the importance of complete smoking cessation.  He tells me he is planning to work on it.    I will have her follow-up with Dr. Mckeon in 4 to 6 months.  If he has any  questions or certainly any new concerns in the meantime, I asked him to let us know.  It was a pleasure seeing him in clinic today.    Orders this Visit:  Orders Placed This Encounter   Procedures     Follow-Up with Cardiology     Orders Placed This Encounter   Medications     metoprolol tartrate (LOPRESSOR) 25 MG tablet     Sig: Take 1 tablet (25 mg) by mouth 2 times daily     Dispense:  180 tablet     Refill:  3     isosorbide mononitrate (IMDUR) 30 MG 24 hr tablet     Sig: Take 1 tablet (30 mg) by mouth daily     Dispense:  90 tablet     Refill:  3     Medications Discontinued During This Encounter   Medication Reason     metoprolol tartrate (LOPRESSOR) 50 MG tablet Reorder     isosorbide mononitrate (IMDUR) 30 MG 24 hr tablet Reorder       CURRENT MEDICATIONS:  Current Outpatient Medications   Medication Sig Dispense Refill     ASPIRIN ADULT LOW STRENGTH 81 MG EC tablet TAKE 1 TABLET (81 MG) BY MOUTH DAILY 100 tablet 2     atorvastatin (LIPITOR) 40 MG tablet TAKE 1 TABLET (40 MG) BY MOUTH DAILY 90 tablet 3     isosorbide mononitrate (IMDUR) 30 MG 24 hr tablet Take 1 tablet (30 mg) by mouth daily 90 tablet 3     lisinopril (ZESTRIL) 20 MG tablet TAKE 1 TABLET (20MG) BY MOUTH DAILY 90 tablet 3     metoprolol tartrate (LOPRESSOR) 25 MG tablet Take 1 tablet (25 mg) by mouth 2 times daily 180 tablet 3     multivitamin, therapeutic (THERA-VIT) TABS tablet Take 1 tablet by mouth Takes when remembers, usually once weekly.       nitroGLYcerin (NITROSTAT) 0.4 MG sublingual tablet For chest pain place 1 tablet under the tongue every 5 minutes for 3 doses. If symptoms persist 5 minutes after 2 nd dose call 911. 30 tablet 1     ticagrelor (BRILINTA) 60 MG tablet Take 1 tablet (60 mg) by mouth 2 times daily 180 tablet 3     ALLERGIES  No Known Allergies  PAST MEDICAL HISTORY:  Past Medical History:   Diagnosis Date     CAD (coronary artery disease)      STEMI (ST elevation myocardial infarction) (H)      Third degree heart  block (H)      Tobacco abuse 3/31/2020     PAST SURGICAL HISTORY:  Past Surgical History:   Procedure Laterality Date     CV CORONARY ANGIOGRAM N/A 3/5/2020    Procedure: Coronary Angiogram;  Surgeon: Fabricio Mata MD;  Location:  HEART CARDI[facility] CATH LAB     CV CORONARY ANGIOGRAM N/A 4/9/2020    Procedure: Coronary Angiogram;  Surgeon: Evens Figueredo MD;  Location:  HE[facility]ARDI[facility] CATH LAB     CV HEART CATHETERIZATION WITH POSSIBLE INTERVENTION N/A 3/5/2020    Procedure: Coronary Angiogram;  Surgeon: Fabricio Mata MD;  Location:  HEART CARDI[facility] CATH LAB     CV HEART CATHETERIZATION WITH POSSIBLE INTERVENTION N/A 3/5/2020    Procedure: Heart Catheterization with Possible Intervention;  Surgeon: Fabricio Mata MD;  Location:  HEART CARDIAC CATH LAB     CV INTRAVASULAR ULTRASOUND N/A 4/9/2020    Procedure: Intravascular Ultrasound;  Surgeon: Evens Figueredo MD;  Location:  HEART CARDIAC CATH LAB     CV LEFT HEART CATH N/A 4/9/2020    Procedure: Left Heart Cath;  Surgeon: Evens Figueredo MD;  Location:  HEART CARDI[facility] CATH LAB     CV PCI ATHERECTOMY ORBITAL N/A 4/9/2020    Procedure: Percutaneous Coronary Intervention Atherectomy Rotational;  Surgeon: Evens Figueredo MD;  Location:  HEART CARDIAC CATH LAB     CV PCI STENT DRUG ELUTING N/A 3/5/2020    Procedure: Percutaneous Coronary Intervention Stent Drug Eluting;  Surgeon: Fabricio Mata MD;  Location:  HEART CARDIAC CATH LAB     CV PCI STENT DRUG ELUTING N/A 4/9/2020    Procedure: Percutaneous Coronary Intervention Stent Drug Eluting;  Surgeon: Evens Figueredo MD;  Location:  HEART CARDIAC CATH LAB     CV TEMPORARY PACEMAKER INSERTION N/A 3/5/2020    Procedure: Temporary Pacemaker Insertion;  Surgeon: Fabricio Mata MD;  Location:  HEART CARDIAC CATH LAB     CV TEMPORARY PACEMAKER INSERTION N/A 3/5/2020    Procedure: Temporary Pacemaker Insertion;  Surgeon: Esperanza  "Fabricio Vides MD;  Location:  HEART CARDIAC CATH LAB     FAMILY HISTORY:  No family history on file.  SOCIAL HISTORY:  Social History     Socioeconomic History     Marital status: Single     Spouse name: None     Number of children: None     Years of education: None     Highest education level: None   Tobacco Use     Smoking status: Current Every Day Smoker     Smokeless tobacco: Never Used   Substance and Sexual Activity     Alcohol use: Not Currently     Drug use: Not Currently     Sexual activity: Not Currently     Partners: Female     Review of Systems:  Skin:  Negative     Eyes:  Positive for glasses  ENT:  Negative    Respiratory:  Negative    Cardiovascular:  lightheadedness;Negative;palpitations;chest pain;edema dizziness;Positive for  Gastroenterology: Negative    Genitourinary:  Negative    Musculoskeletal:  Negative back pain;neck pain  Neurologic:  Negative headaches  Psychiatric:  Negative    Heme/Lymph/Imm:  Negative allergies  Endocrine:  Negative       Physical Exam:  Vitals: BP (!) 144/89 (BP Location: Left arm, Patient Position: Sitting)   Pulse 64   Ht 1.727 m (5' 8\")   Wt 87.1 kg (192 lb 1.6 oz)   SpO2 99%   BMI 29.21 kg/m     Wt Readings from Last 4 Encounters:   04/19/22 87.1 kg (192 lb 1.6 oz)   04/12/22 86.1 kg (189 lb 12.8 oz)   12/17/21 86.6 kg (191 lb)   12/01/21 87.5 kg (193 lb)     GEN: well nourished, in no acute distress.  HEENT:  Pupils equal, round. Sclerae nonicteric.   C/V:  Regular rate and rhythm, no murmur, rub or gallop.    RESP: Respirations are unlabored. Clear to auscultation bilaterally without wheezing, rales, or rhonchi.  GI: Abdomen soft, nontender.  EXTREM: No LE edema.  NEURO: Alert and oriented, cooperative.  SKIN: Warm and dry.     Recent Lab Results:  LIPID RESULTS:  Lab Results   Component Value Date    CHOL 110 12/01/2021    CHOL 92 11/30/2020    HDL 32 (L) 12/01/2021    HDL 31 (L) 11/30/2020    LDL 26 12/01/2021    LDL 45 11/30/2020    TRIG 262 (H) " 12/01/2021    TRIG 78 11/30/2020     LIVER ENZYME RESULTS:  Lab Results   Component Value Date    AST 20 11/16/2020    ALT 51 11/30/2020     CBC RESULTS:  Lab Results   Component Value Date    WBC 11.1 (H) 12/01/2021    WBC 8.7 04/09/2020    RBC 5.79 12/01/2021    RBC 5.17 04/09/2020    HGB 15.9 12/01/2021    HGB 14.3 04/09/2020    HCT 48.0 12/01/2021    HCT 43.7 04/09/2020    MCV 83 12/01/2021    MCV 85 04/09/2020    MCH 27.5 12/01/2021    MCH 27.7 04/09/2020    MCHC 33.1 12/01/2021    MCHC 32.7 04/09/2020    RDW 13.4 12/01/2021    RDW 13.2 04/09/2020     12/01/2021     04/09/2020     BMP RESULTS:  Lab Results   Component Value Date     12/01/2021     11/16/2020    POTASSIUM 4.8 12/01/2021    POTASSIUM 3.9 11/16/2020    CHLORIDE 108 12/01/2021    CHLORIDE 107 11/16/2020    CO2 30 12/01/2021    CO2 26 11/16/2020    ANIONGAP <1 (L) 12/01/2021    ANIONGAP 5 11/16/2020     (H) 12/01/2021    GLC 88 11/16/2020    BUN 20 12/01/2021    BUN 17 11/16/2020    CR 1.02 12/01/2021    CR 0.96 11/16/2020    GFRESTIMATED 86 12/01/2021    GFRESTIMATED >90 11/16/2020    GFRESTBLACK >90 11/16/2020    RINA 8.8 12/01/2021    RINA 8.9 11/16/2020      A1C RESULTS:  Lab Results   Component Value Date    A1C 5.7 (H) 03/06/2020     INR RESULTS:  Lab Results   Component Value Date    INR 1.02 04/09/2020    INR 1.08 03/05/2020       Radha Gallardo PA-C  [facility]      Thank you for allowing me to participate in the care of your patient.      Sincerely,     Radha Gallardo PA-C     [facility]  cc:   Radha Gallardo PA-C  [id number]  [place] [id number]

## 2022-04-19 NOTE — PROGRESS NOTES
Cardiology Progress Note    Patient seen today in follow up of: CAD  Primary cardiologist: Dr. Salazar    HPI:  Candelario Stahl is a very pleasant 51 year old male with a history of  hypertension, tobacco use, and coronary artery disease with a STEMI in March 2020.    He presented on 3/5/2020 to the emergency room and was found to have an acute ST elevation MI and third-degree heart block.  He was taken emergently to the Cath Lab where a temporary pacer was placed.  Coronary angiography showed a 100% RCA occlusion.  This was treated with 3 drug-eluting stents to the proximal and mid RCA.  He had a residual 70% stenosis in the proximal to mid LAD, a 60% lesion in the distal circumflex, and a 60% OM3 lesion.  He continued to have recurrent chest pain and inferior changes on his EKG and was brought back to the Cath Lab where he was found to have stent thrombosis of the mid RCA stent.  He underwent high-pressure balloon angioplasty.  His pacer wire was removed the following day.  His follow-up echo showed preserved LV function overall but severe hypokinesis of the basal inferior wall.      He was seen in follow-up and continued to have chest discomfort with exertion.  He ultimately underwent a staged intervention to his residual proximal LAD lesion in April.  He had PCI of the ostial and mid LAD and D1 bifurcation with rotational atherectomy and stenting with 3 drug-eluting stents using a DK crush technique.  Following that intervention, his symptoms resolved.  He was seen in follow-up by Dr. Salazar in 2020.  Prior to that, he been seen by his primary for some chest discomfort in the setting of hypertension.  His lisinopril was increased to 20 mg daily. Dr. Salazar recommended consideration a stress test if he had further angina.  A follow-up in 6 months was recommended.    I subsequently saw Candelario in December 2021.  At that visit, he had several complaints including some warmth in his chest and heartburn.  He felt  the symptoms were similar to what he experienced prior to his heart attack.  He was also having some dizziness and fatigue.  I started him on Imdur and cut back his metoprolol from 50 mg twice daily to 25 mg twice daily.  I also ordered a Lexiscan stress test.    Candelario is back today for overdue follow-up.  He did have a stress test done on 4/12/2022.  He exercised for 9 minutes.  He had a borderline hypertensive response to exercise.  He had 1 mm upsloping ST segment depression in the inferolateral leads.  Nuclear stress imaging showed a nontransmural infarct in the mid to basal inferolateral segment of the LV associated with a mild degree of anne-infarct ischemia.  His EF was normal.    He is back today in follow-up of those results.  He tells me he is feeling markedly better than when I saw him in December.  His fatigue and dizziness have essentially resolved.  Additionally, he has not had any further chest discomfort, heartburn or the warmth he was experiencing.  He has been off of Imdur for a few weeks as he tells me his pharmacy would not fill it.  Blood pressures have been in the 130s typically at home when he has been monitoring.  He denies shortness of breath.  No presyncope or syncope.    Unfortunately, he continues to smoke about a half a pack a day.  He tells me he plans to work on cessation this summer.    ASSESSMENT/PLAN:  Candelario Stahl is a delightful 51-year-old male with a history of hypertension, ongoing tobacco use, and coronary artery disease with a STEMI in March of 2020. As noted, he underwent stenting of the proximal to mid LAD at that time with 3 drug-eluting stents.  He continued to have angina and underwent a staged intervention to his proximal LAD and D1 with an additional 3 drug-eluting stents.  He has residual moderate disease in his circumflex.    Fortunately, Candelario appears to be doing well from a cardiovascular perspective today.  I suspect he was having some symptoms from his  beta-blocker including fatigue and possibly some dizziness.  This is better on the lower dose at 25 mg twice daily and we will continue that for now.      We reviewed his recent stress test which showed a nontransmural infarct in the mid to basal inferolateral segment of his LV consistent with his known prior MI.  There was mild ischemia noted.  Today, he is free of any anginal complaints.  Given he is doing well, we will continue with medical management.  He has been off of his Imdur for a few weeks as he tells me the pharmacy would not fill it.  I sent in a new prescription and we will continue that for now.  If he develops any new chest discomfort or shortness of breath, I asked him to let me know right away.  He will continue on aspirin and Brilinta at 60 mg twice daily.  We are planning on long-term dual antiplatelet therapy given his extensive stenting as long as he tolerates.  He is on Lipitor 40 mg daily and LDL is been very well controlled at 26 on last check.    He is mildly hypertensive in clinic today.  As noted, will resume his Imdur.  He has at times admitted to not taking his blood pressure medications as when he is tracked his blood pressures are well controlled.  I discussed the importance of compliance and encouraged him to take his medications as prescribed on a daily basis and he was agreeable to that.  His lisinopril could certainly be titrated up if needed but for now I asked him to continue to monitor his blood pressures at home and call if they are persistently elevated.    He is continuing to smoke about a half a pack a day.  I again stressed the importance of complete smoking cessation.  He tells me he is planning to work on it.    I will have her follow-up with Dr. Mckeon in 4 to 6 months.  If he has any questions or certainly any new concerns in the meantime, I asked him to let us know.  It was a pleasure seeing him in clinic today.    Orders this Visit:  Orders Placed This Encounter    Procedures     Follow-Up with Cardiology     Orders Placed This Encounter   Medications     metoprolol tartrate (LOPRESSOR) 25 MG tablet     Sig: Take 1 tablet (25 mg) by mouth 2 times daily     Dispense:  180 tablet     Refill:  3     isosorbide mononitrate (IMDUR) 30 MG 24 hr tablet     Sig: Take 1 tablet (30 mg) by mouth daily     Dispense:  90 tablet     Refill:  3     Medications Discontinued During This Encounter   Medication Reason     metoprolol tartrate (LOPRESSOR) 50 MG tablet Reorder     isosorbide mononitrate (IMDUR) 30 MG 24 hr tablet Reorder       CURRENT MEDICATIONS:  Current Outpatient Medications   Medication Sig Dispense Refill     ASPIRIN ADULT LOW STRENGTH 81 MG EC tablet TAKE 1 TABLET (81 MG) BY MOUTH DAILY 100 tablet 2     atorvastatin (LIPITOR) 40 MG tablet TAKE 1 TABLET (40 MG) BY MOUTH DAILY 90 tablet 3     isosorbide mononitrate (IMDUR) 30 MG 24 hr tablet Take 1 tablet (30 mg) by mouth daily 90 tablet 3     lisinopril (ZESTRIL) 20 MG tablet TAKE 1 TABLET (20MG) BY MOUTH DAILY 90 tablet 3     metoprolol tartrate (LOPRESSOR) 25 MG tablet Take 1 tablet (25 mg) by mouth 2 times daily 180 tablet 3     multivitamin, therapeutic (THERA-VIT) TABS tablet Take 1 tablet by mouth Takes when remembers, usually once weekly.       nitroGLYcerin (NITROSTAT) 0.4 MG sublingual tablet For chest pain place 1 tablet under the tongue every 5 minutes for 3 doses. If symptoms persist 5 minutes after 2 nd dose call 911. 30 tablet 1     ticagrelor (BRILINTA) 60 MG tablet Take 1 tablet (60 mg) by mouth 2 times daily 180 tablet 3     ALLERGIES  No Known Allergies  PAST MEDICAL HISTORY:  Past Medical History:   Diagnosis Date     CAD (coronary artery disease)      STEMI (ST elevation myocardial infarction) (H)      Third degree heart block (H)      Tobacco abuse 3/31/2020     PAST SURGICAL HISTORY:  Past Surgical History:   Procedure Laterality Date     CV CORONARY ANGIOGRAM N/A 3/5/2020    Procedure: Coronary  Angiogram;  Surgeon: Fabricio Mata MD;  Location:  [facility]ATH [facility]     CV CORONARY ANGIOGRAM N/A 4/9/2020    Procedure: Coronary Angiogram;  Surgeon: Evens Figueredo MD;  Location:  [facility]ATH [facility]     CV HEART CATHETERIZATION WITH POSSIBLE INTERVENTION N/A 3/5/2020    Procedure: Coronary Angiogram;  Surgeon: Fabricio Mata MD;  Location:  [facility] CATH [facility]     CV HEART CATHETERIZATION WITH POSSIBLE INTERVENTION N/A 3/5/2020    Procedure: Heart Catheterization with Possible Intervention;  Surgeon: Fabricio Mata MD;  Location:  [facility] CATH [facility]     CV INTRAVASULAR ULTRASOUND N/A 4/9/2020    Procedure: Intravascular Ultrasound;  Surgeon: Evens Figueredo MD;  Location:  [facility] CATH LAB     CV LEFT HEART CATH N/A 4/9/2020    Procedure: Left Heart Cath;  Surgeon: Evens Figueredo MD;  Location:  [facility] CATH LAB     CV PCI ATHERECTOMY ORBITAL N/A 4/9/2020    Procedure: Percutaneous Coronary Intervention Atherectomy Rotational;  Surgeon: Evens Figueredo MD;  Location:  HE[facility] CATH LAB     CV PCI STENT DRUG ELUTING N/A 3/5/2020    Procedure: Percutaneous Coronary Intervention Stent Drug Eluting;  Surgeon: Fabricio Mata MD;  Location:  [facility] CATH [facility]     CV PCI STENT DRUG ELUTING N/A 4/9/2020    Procedure: Percutaneous Coronary Intervention Stent Drug Eluting;  Surgeon: Evens Figueredo MD;  Location:  [facility] CATH LAB     CV TEMPORARY PACEMAKER INSERTION N/A 3/5/2020    Procedure: Temporary Pacemaker Insertion;  Surgeon: Fabricio Mata MD;  Location:  HE[facility] CATH LAB     CV TEMPORARY PACEMAKER INSERTION N/A 3/5/2020    Procedure: Temporary Pacemaker Insertion;  Surgeon: Fabricio Mata MD;  Location:  HE[facility] CARD[facility] CATH LAB     FAMILY HISTORY:  No family history on file.  SOCIAL HISTORY:  Social History     Socioeconomic History     Marital status:  "Single     Spouse name: None     Number of children: None     Years of education: None     Highest education level: None   Tobacco Use     Smoking status: Current Every Day Smoker     Smokeless tobacco: Never Used   Substance and Sexual Activity     Alcohol use: Not Currently     Drug use: Not Currently     Sexual activity: Not Currently     Partners: Female     Review of Systems:  Skin:  Negative     Eyes:  Positive for glasses  ENT:  Negative    Respiratory:  Negative    Cardiovascular:  lightheadedness;Negative;palpitations;chest pain;edema dizziness;Positive for  Gastroenterology: Negative    Genitourinary:  Negative    Musculoskeletal:  Negative back pain;neck pain  Neurologic:  Negative headaches  Psychiatric:  Negative    Heme/Lymph/Imm:  Negative allergies  Endocrine:  Negative       Physical Exam:  Vitals: BP (!) 144/89 (BP Location: Left arm, Patient Position: Sitting)   Pulse 64   Ht 1.727 m (5' 8\")   Wt 87.1 kg (192 lb 1.6 oz)   SpO2 99%   BMI 29.21 kg/m     Wt Readings from Last 4 Encounters:   04/19/22 87.1 kg (192 lb 1.6 oz)   04/12/22 86.1 kg (189 lb 12.8 oz)   12/17/21 86.6 kg (191 lb)   12/01/21 87.5 kg (193 lb)     GEN: well nourished, in no acute distress.  HEENT:  Pupils equal, round. Sclerae nonicteric.   C/V:  Regular rate and rhythm, no murmur, rub or gallop.    RESP: Respirations are unlabored. Clear to auscultation bilaterally without wheezing, rales, or rhonchi.  GI: Abdomen soft, nontender.  EXTREM: No LE edema.  NEURO: Alert and oriented, cooperative.  SKIN: Warm and dry.     Recent Lab Results:  LIPID RESULTS:  Lab Results   Component Value Date    CHOL 110 12/01/2021    CHOL 92 11/30/2020    HDL 32 (L) 12/01/2021    HDL 31 (L) 11/30/2020    LDL 26 12/01/2021    LDL 45 11/30/2020    TRIG 262 (H) 12/01/2021    TRIG 78 11/30/2020     LIVER ENZYME RESULTS:  Lab Results   Component Value Date    AST 20 11/16/2020    ALT 51 11/30/2020     CBC RESULTS:  Lab Results   Component Value Date "    WBC 11.1 (H) 12/01/2021    WBC 8.7 04/09/2020    RBC 5.79 12/01/2021    RBC 5.17 04/09/2020    HGB 15.9 12/01/2021    HGB 14.3 04/09/2020    HCT 48.0 12/01/2021    HCT 43.7 04/09/2020    MCV 83 12/01/2021    MCV 85 04/09/2020    MCH 27.5 12/01/2021    MCH 27.7 04/09/2020    MCHC 33.1 12/01/2021    MCHC 32.7 04/09/2020    RDW 13.4 12/01/2021    RDW 13.2 04/09/2020     12/01/2021     04/09/2020     BMP RESULTS:  Lab Results   Component Value Date     12/01/2021     11/16/2020    POTASSIUM 4.8 12/01/2021    POTASSIUM 3.9 11/16/2020    CHLORIDE 108 12/01/2021    CHLORIDE 107 11/16/2020    CO2 30 12/01/2021    CO2 26 11/16/2020    ANIONGAP <1 (L) 12/01/2021    ANIONGAP 5 11/16/2020     (H) 12/01/2021    GLC 88 11/16/2020    BUN 20 12/01/2021    BUN 17 11/16/2020    CR 1.02 12/01/2021    CR 0.96 11/16/2020    GFRESTIMATED 86 12/01/2021    GFRESTIMATED >90 11/16/2020    GFRESTBLACK >90 11/16/2020    RINA 8.8 12/01/2021    RINA 8.9 11/16/2020      A1C RESULTS:  Lab Results   Component Value Date    A1C 5.7 (H) 03/06/2020     INR RESULTS:  Lab Results   Component Value Date    INR 1.02 04/09/2020    INR 1.08 03/05/2020       Radha Gallardo PA-C  [facility]

## 2022-04-19 NOTE — PATIENT INSTRUCTIONS
"Thank you for your [facility] visit today. Your provider has recommended the following:    Medication Changes:  RESTART Imdur 30 mg daily. I sent in a new prescription.   CONTINUE metoprolol at 25 mg twice daily. I sent in a new prescription for 25 mg tablets so you can take one of those twice daily.   Recommendations:  -  Your stress test showed evidence of a prior heart attack with a little area of what we call ischemia or lack of blood flow. Since you are feeling well, we will continue to manage things with medications.   -  If you have any worsening chest pain or shortness of breath, please let me know right away.  -  work on smoking!!   -  please keep an eye on your blood pressures at home - I'd like to see them consistently in the 120s or so. If they are higher, please call me   Follow-up:  See Dr. Salazar for cardiology follow up in 4 - 6 months.  Reminder:  Please bring in all current medications, over the counter supplements and vitamin bottles to your next appointment.  Important \"[facility] telephone numbers for your reference:  Cardiology Scheduling - [phone]  Cardiology Clinic RN-  [phone]     [facility]    "

## 2022-08-04 DIAGNOSIS — J30.1 SEASONAL ALLERGIC RHINITIS DUE TO POLLEN: ICD-10-CM

## 2022-08-04 RX ORDER — CETIRIZINE HYDROCHLORIDE 10 MG/1
TABLET ORAL
Qty: 90 TABLET | Refills: 0 | Status: SHIPPED | OUTPATIENT
Start: 2022-08-04 | End: 2022-10-14

## 2022-08-04 NOTE — TELEPHONE ENCOUNTER
Routing refill request to provider for review/approval because:  Drug not active on patient's medication list  Previous patient of Dr. Mai and has not been seen for reestablishment of care.    Destinee Mena RN

## 2022-09-16 ENCOUNTER — OFFICE VISIT (OUTPATIENT)
Dept: CARDIOLOGY | Facility: CLINIC | Age: 52
End: 2022-09-16
Attending: PHYSICIAN ASSISTANT
Payer: COMMERCIAL

## 2022-09-16 VITALS
HEIGHT: 67 IN | DIASTOLIC BLOOD PRESSURE: 85 MMHG | BODY MASS INDEX: 29.91 KG/M2 | WEIGHT: 190.6 LBS | SYSTOLIC BLOOD PRESSURE: 135 MMHG | HEART RATE: 61 BPM

## 2022-09-16 DIAGNOSIS — I25.10 CORONARY ARTERY DISEASE INVOLVING NATIVE CORONARY ARTERY OF NATIVE HEART WITHOUT ANGINA PECTORIS: ICD-10-CM

## 2022-09-16 PROCEDURE — 99214 OFFICE O/P EST MOD 30 MIN: CPT | Performed by: INTERNAL MEDICINE

## 2022-09-16 NOTE — PATIENT INSTRUCTIONS
Stress test every two years  Let us know if any chest pain  Continue current medications  Lipid panel and labs in 3 months   Follow up with Dr. Salazar in one year

## 2022-09-16 NOTE — LETTER
9/16/2022    Physician No Ref-Primary  No address on file    RE: Candelario Stahl       Dear Colleague,     I had the pleasure of seeing Candelario Stahl in the [facility].          HPI:       This is a pleasant 51-year-old man from [place] with a past medical history significant for hypertension, tobacco abuse, and family history of coronary artery disease.     From prior records: On 3/5/2020 he presented to the ED with an acute ST elevation MI and third-degree heart block.  He was taken emergently to the Cath Lab where a temporary pacemaker was placed.  He angiogram showed a 100% RCA occlusion which was treated with 3 drug-eluting stents to the proximal and mid RCA.  There was a residual 70% stenosis in the proximal to mid LAD and 60% distal circumflex and a 60% OM 3 lesion.  He continued to have recurrent chest pain with inferior changes noted on his EKG post procedure and was brought back to the Cath Lab where he was found to have stent thrombosis of the mid RCA stent.  He underwent high-pressure balloon angioplasty with restoration of LETTY-3 flow.  His pacemaker wire was removed the next day.  He had a follow-up echo which showed overall preserved LV function but possible severe hypokinesis of the basal inferior wall.  RV function was normal.  He was discharged on Brilinta, aspirin, metoprolol, lisinopril, and atorvastatin.     He had a follow-up visit with LUIS Kaur on 3/31/2020.  He was experiencing chest discomfort with exertion that resolved with rest and ultimately it was recommended he undergo a staged intervention to the his residual proximal LAD.  On 4/9 he underwent PCI of the ostial and mid LAD and D1 bifurcation with rotational atherectomy and stenting with 3 drug-eluting stents using a DK crush technique. He had follow up in April with Radha and was improved.     He saw Radha in April and due to some chest pain underwent nuclear stress testing that demonstrated  the following:         There is no prior study for comparison.     The nuclear stress test is abnormal. There is nontransmural infarction in the mid to basal inferolateral segment(s) of the left ventricle associated with a mild degree of anne-infarct ischemia. The left ventricular ejection fraction at stress is 61%. Left ventricular function is normal.    Given mild anne-infarct ischemia imdur was initiated for antianginal control. He has not had chest pain since. He denies SOB. He is compliant with medications. He remains on lifelong DAPT due to above. BP today is under excellent control.           Assessment/Plan:     50-year-old male with a history of coronary artery disease and inferior STEMI treated with 3 drug-eluting stents to his proximal and mid RCA.  He then underwent complex intervention to the ostial and mid LAD and D1 bifurcation. Remains on DAPT and anginal symptoms have improved. He has known circumflex and OM disease which we will continue to medically manage for now. If he has recurrent angina despite adequate BP control we could consider intervention to this in the future.  He is also appropriately on high intensity statin therapy, beta blocker, and lisinopril.      1. HLD: His LDL was 146 on 3/5/2020.  He is currently on Lipitor 40 mg daily. He will need a follow up lipid profile in 6 months with LFTs.     2. HTN: On imdur. Increased lisinopril to 20 mg daily. Metoprolol 25 mg twice a day.     3. CAD: as above, continue DAPT with Brilinta and aspirin. possible stress test to assess OM/LCX progression in future if needed.      Follow up in one year. Smoking cessation again discussed with patient who is trying to quit but under several stressors making it difficult.     Charlee Salazar MD MSc  [facility]           PAST MEDICAL HISTORY  Past Medical History:   Diagnosis Date     CAD (coronary artery disease)      STEMI (ST elevation myocardial infarction) (H)      Third degree heart block  (H)      Tobacco abuse 3/31/2020       CURRENT MEDICATIONS  Current Outpatient Medications   Medication Sig Dispense Refill     ASPIRIN ADULT LOW STRENGTH 81 MG EC tablet TAKE 1 TABLET (81 MG) BY MOUTH DAILY 100 tablet 2     atorvastatin (LIPITOR) 40 MG tablet TAKE 1 TABLET (40 MG) BY MOUTH DAILY 90 tablet 3     cetirizine (ZYRTEC) 10 MG tablet TAKE 1 TABLET (10MG) BY MOUTH DAILY 90 tablet 0     isosorbide mononitrate (IMDUR) 30 MG 24 hr tablet Take 1 tablet (30 mg) by mouth daily 90 tablet 3     lisinopril (ZESTRIL) 20 MG tablet TAKE 1 TABLET (20MG) BY MOUTH DAILY 90 tablet 3     metoprolol tartrate (LOPRESSOR) 25 MG tablet Take 1 tablet (25 mg) by mouth 2 times daily 180 tablet 3     multivitamin, therapeutic (THERA-VIT) TABS tablet Take 1 tablet by mouth Takes when remembers, usually once weekly.       ticagrelor (BRILINTA) 60 MG tablet Take 1 tablet (60 mg) by mouth 2 times daily 180 tablet 3     nitroGLYcerin (NITROSTAT) 0.4 MG sublingual tablet For chest pain place 1 tablet under the tongue every 5 minutes for 3 doses. If symptoms persist 5 minutes after 2 nd dose call 911. (Patient not taking: Reported on 9/16/2022) 30 tablet 1       PAST SURGICAL HISTORY:  Past Surgical History:   Procedure Laterality Date     CV CORONARY ANGIOGRAM N/A 3/5/2020    Procedure: Coronary Angiogram;  Surgeon: Fabricio Mata MD;  Location: [facility] CARDIAC CATH LAB     CV CORONARY ANGIOGRAM N/A 4/9/2020    Procedure: Coronary Angiogram;  Surgeon: Evens Figueredo MD;  Location: [facility] CARDIAC CATH LAB     CV HEART CATHETERIZATION WITH POSSIBLE INTERVENTION N/A 3/5/2020    Procedure: Coronary Angiogram;  Surgeon: Fabricio Mata MD;  Location:  HEART CARDIAC CATH LAB     CV HEART CATHETERIZATION WITH POSSIBLE INTERVENTION N/A 3/5/2020    Procedure: Heart Catheterization with Possible Intervention;  Surgeon: Fabricio Mata MD;  Location: [facility] CARDIAC CATH LAB     CV INTRAVASULAR ULTRASOUND N/A  4/9/2020    Procedure: Intravascular Ultrasound;  Surgeon: Evens Figueredo MD;  Location:  HEART CARDIAC CATH LAB     CV LEFT HEART CATH N/A 4/9/2020    Procedure: Left Heart Cath;  Surgeon: Evens Figueredo MD;  Location:  HEART CARDIAC CATH LAB     CV PCI ATHERECTOMY ORBITAL N/A 4/9/2020    Procedure: Percutaneous Coronary Intervention Atherectomy Rotational;  Surgeon: Evens Figueredo MD;  Location:  HEART CARDIAC CATH LAB     CV PCI STENT DRUG ELUTING N/A 3/5/2020    Procedure: Percutaneous Coronary Intervention Stent Drug Eluting;  Surgeon: Fabricio Mata MD;  Location:  HEART CARDIAC CATH LAB     CV PCI STENT DRUG ELUTING N/A 4/9/2020    Procedure: Percutaneous Coronary Intervention Stent Drug Eluting;  Surgeon: Evens Figueredo MD;  Location:  HEART CARDIAC CATH LAB     CV TEMPORARY PACEMAKER INSERTION N/A 3/5/2020    Procedure: Temporary Pacemaker Insertion;  Surgeon: Fabricio Mata MD;  Location:  HEART CARDIAC CATH LAB     CV TEMPORARY PACEMAKER INSERTION N/A 3/5/2020    Procedure: Temporary Pacemaker Insertion;  Surgeon: Fabricio Mata MD;  Location:  HEART CARDIAC CATH LAB       ALLERGIES   No Known Allergies    FAMILY HISTORY  +cardiac disease    SOCIAL HISTORY  Social History     Socioeconomic History     Marital status: Single     Spouse name: Not on file     Number of children: Not on file     Years of education: Not on file     Highest education level: Not on file   Occupational History     Not on file   Tobacco Use     Smoking status: Current Every Day Smoker     Packs/day: 1.00     Smokeless tobacco: Never Used   Substance and Sexual Activity     Alcohol use: Not Currently     Drug use: Not Currently     Sexual activity: Not Currently     Partners: Female   Other Topics Concern     Parent/sibling w/ CABG, MI or angioplasty before 65F 55M? Not Asked   Social History Narrative     Not on file     Social Determinants of Health  "    Financial Resource Strain: Not on file   Food Insecurity: Not on file   Transportation Needs: Not on file   Physical Activity: Not on file   Stress: Not on file   Social Connections: Not on file   Intimate Partner Violence: Not on file   Housing Stability: Not on file       ROS:   Constitutional: No fever, chills, or sweats. No weight gain/loss   ENT: No visual disturbance, ear ache, epistaxis, sore throat  Allergies/Immunologic: Negative  Respiratory: No cough, hemoptysia  Cardiovascular: As per HPI  GI: No nausea, vomiting, hematemesis, melena, or hematochezia  : No urinary frequency, dysuria, or hematuria  Integument: Negative  Psychiatric: Negative  Neuro: Negative  Endocrinology: Negative   Musculoskeletal: Negative  Vascular: No walking impairment, claudication, ischemic rest pain or nonhealing wounds    EXAM:  /85 (BP Location: Left arm, Patient Position: Sitting, Cuff Size: Adult Regular)   Pulse 61   Ht 1.702 m (5' 7\")   Wt 86.5 kg (190 lb 9.6 oz)   BMI 29.85 kg/m    In general, the patient is a pleasant male in no apparent distress.    HEENT: NC/AT.  PERRLA.  EOMI.  Sclerae white, not injected.    Neck: No adenopathy.  No thyromegaly. Carotids +2/2 bilaterally without bruits.  No jugular venous distension.   Heart: RRR. Normal S1, S2 splits physiologically. No murmur, rub, click, or gallop.   Lungs: CTA.  No ronchi, wheezes, rales.  No dullness to percussion.   Abdomen: Soft, nontender, nondistended. No organomegaly. No AAA.  No bruits.   Extremities: No clubbing, cyanosis, or edema.    Vascular: No bruits are noted.    Labs:  LIPID RESULTS:  Lab Results   Component Value Date    CHOL 110 12/01/2021    CHOL 92 11/30/2020    HDL 32 (L) 12/01/2021    HDL 31 (L) 11/30/2020    LDL 26 12/01/2021    LDL 45 11/30/2020    TRIG 262 (H) 12/01/2021    TRIG 78 11/30/2020    NHDL 78 12/01/2021    NHDL 61 11/30/2020       LIVER ENZYME RESULTS:  Lab Results   Component Value Date    AST 20 11/16/2020    " ALT 51 11/30/2020       CBC RESULTS:  Lab Results   Component Value Date    WBC 11.1 (H) 12/01/2021    WBC 8.7 04/09/2020    RBC 5.79 12/01/2021    RBC 5.17 04/09/2020    HGB 15.9 12/01/2021    HGB 14.3 04/09/2020    HCT 48.0 12/01/2021    HCT 43.7 04/09/2020    MCV 83 12/01/2021    MCV 85 04/09/2020    MCH 27.5 12/01/2021    MCH 27.7 04/09/2020    MCHC 33.1 12/01/2021    MCHC 32.7 04/09/2020    RDW 13.4 12/01/2021    RDW 13.2 04/09/2020     12/01/2021     04/09/2020       BMP RESULTS:  Lab Results   Component Value Date     12/01/2021     11/16/2020    POTASSIUM 4.8 12/01/2021    POTASSIUM 3.9 11/16/2020    CHLORIDE 108 12/01/2021    CHLORIDE 107 11/16/2020    CO2 30 12/01/2021    CO2 26 11/16/2020    ANIONGAP <1 (L) 12/01/2021    ANIONGAP 5 11/16/2020     (H) 12/01/2021    GLC 88 11/16/2020    BUN 20 12/01/2021    BUN 17 11/16/2020    CR 1.02 12/01/2021    CR 0.96 11/16/2020    GFRESTIMATED 86 12/01/2021    GFRESTIMATED >90 11/16/2020    GFRESTBLACK >90 11/16/2020    RINA 8.8 12/01/2021    RINA 8.9 11/16/2020        A1C RESULTS:  Lab Results   Component Value Date    A1C 5.7 (H) 03/06/2020       Thank you for allowing me to participate in the care of your patient.      Sincerely,     Charlee Salazar MD     [facility]  cc:   Radha Gallardo PA-C  [id number]  [address]  [place] [id number]

## 2022-09-16 NOTE — PROGRESS NOTES
HPI:       This is a pleasant 51-year-old man from [place] with a past medical history significant for hypertension, tobacco abuse, and family history of coronary artery disease.     From prior records: On 3/5/2020 he presented to the ED with an acute ST elevation MI and third-degree heart block.  He was taken emergently to the Cath Lab where a temporary pacemaker was placed.  He angiogram showed a 100% RCA occlusion which was treated with 3 drug-eluting stents to the proximal and mid RCA.  There was a residual 70% stenosis in the proximal to mid LAD and 60% distal circumflex and a 60% OM 3 lesion.  He continued to have recurrent chest pain with inferior changes noted on his EKG post procedure and was brought back to the Cath Lab where he was found to have stent thrombosis of the mid RCA stent.  He underwent high-pressure balloon angioplasty with restoration of LETTY-3 flow.  His pacemaker wire was removed the next day.  He had a follow-up echo which showed overall preserved LV function but possible severe hypokinesis of the basal inferior wall.  RV function was normal.  He was discharged on Brilinta, aspirin, metoprolol, lisinopril, and atorvastatin.     He had a follow-up visit with LUIS Kaur on 3/31/2020.  He was experiencing chest discomfort with exertion that resolved with rest and ultimately it was recommended he undergo a staged intervention to the his residual proximal LAD.  On 4/9 he underwent PCI of the ostial and mid LAD and D1 bifurcation with rotational atherectomy and stenting with 3 drug-eluting stents using a DK crush technique. He had follow up in April with Radha and was improved.     He saw Radha in April and due to some chest pain underwent nuclear stress testing that demonstrated the following:         There is no prior study for comparison.     The nuclear stress test is abnormal. There is nontransmural infarction in the mid to basal inferolateral segment(s) of the  left ventricle associated with a mild degree of anne-infarct ischemia. The left ventricular ejection fraction at stress is 61%. Left ventricular function is normal.    Given mild anne-infarct ischemia imdur was initiated for antianginal control. He has not had chest pain since. He denies SOB. He is compliant with medications. He remains on lifelong DAPT due to above. BP today is under excellent control.           Assessment/Plan:     50-year-old male with a history of coronary artery disease and inferior STEMI treated with 3 drug-eluting stents to his proximal and mid RCA.  He then underwent complex intervention to the ostial and mid LAD and D1 bifurcation. Remains on DAPT and anginal symptoms have improved. He has known circumflex and OM disease which we will continue to medically manage for now. If he has recurrent angina despite adequate BP control we could consider intervention to this in the future.  He is also appropriately on high intensity statin therapy, beta blocker, and lisinopril.      1. HLD: His LDL was 146 on 3/5/2020.  He is currently on Lipitor 40 mg daily. He will need a follow up lipid profile in 6 months with LFTs.     2. HTN: On imdur. Increased lisinopril to 20 mg daily. Metoprolol 25 mg twice a day.     3. CAD: as above, continue DAPT with Brilinta and aspirin. possible stress test to assess OM/LCX progression in future if needed.      Follow up in one year. Smoking cessation again discussed with patient who is trying to quit but under several stressors making it difficult.     Charlee Salazar MD MSc  [facility]           PAST MEDICAL HISTORY  Past Medical History:   Diagnosis Date     CAD (coronary artery disease)      STEMI (ST elevation myocardial infarction) (H)      Third degree heart block (H)      Tobacco abuse 3/31/2020       CURRENT MEDICATIONS  Current Outpatient Medications   Medication Sig Dispense Refill     ASPIRIN ADULT LOW STRENGTH 81 MG EC tablet TAKE 1 TABLET (81  MG) BY MOUTH DAILY 100 tablet 2     atorvastatin (LIPITOR) 40 MG tablet TAKE 1 TABLET (40 MG) BY MOUTH DAILY 90 tablet 3     cetirizine (ZYRTEC) 10 MG tablet TAKE 1 TABLET (10MG) BY MOUTH DAILY 90 tablet 0     isosorbide mononitrate (IMDUR) 30 MG 24 hr tablet Take 1 tablet (30 mg) by mouth daily 90 tablet 3     lisinopril (ZESTRIL) 20 MG tablet TAKE 1 TABLET (20MG) BY MOUTH DAILY 90 tablet 3     metoprolol tartrate (LOPRESSOR) 25 MG tablet Take 1 tablet (25 mg) by mouth 2 times daily 180 tablet 3     multivitamin, therapeutic (THERA-VIT) TABS tablet Take 1 tablet by mouth Takes when remembers, usually once weekly.       ticagrelor (BRILINTA) 60 MG tablet Take 1 tablet (60 mg) by mouth 2 times daily 180 tablet 3     nitroGLYcerin (NITROSTAT) 0.4 MG sublingual tablet For chest pain place 1 tablet under the tongue every 5 minutes for 3 doses. If symptoms persist 5 minutes after 2 nd dose call 911. (Patient not taking: Reported on 9/16/2022) 30 tablet 1       PAST SURGICAL HISTORY:  Past Surgical History:   Procedure Laterality Date     CV CORONARY ANGIOGRAM N/A 3/5/2020    Procedure: Coronary Angiogram;  Surgeon: Fabricio Mata MD;  Location: [facility] CARDIAC CATH LAB     CV CORONARY ANGIOGRAM N/A 4/9/2020    Procedure: Coronary Angiogram;  Surgeon: Evens Figueredo MD;  Location:  HEART CARDIAC CATH LAB     CV HEART CATHETERIZATION WITH POSSIBLE INTERVENTION N/A 3/5/2020    Procedure: Coronary Angiogram;  Surgeon: Fabricio Mata MD;  Location:  HEART CARDIAC CATH LAB     CV HEART CATHETERIZATION WITH POSSIBLE INTERVENTION N/A 3/5/2020    Procedure: Heart Catheterization with Possible Intervention;  Surgeon: Fabrciio Mata MD;  Location:  HEART CARDIAC CATH LAB     CV INTRAVASULAR ULTRASOUND N/A 4/9/2020    Procedure: Intravascular Ultrasound;  Surgeon: Evens Figueredo MD;  Location:  HEART CARDIAC CATH LAB     CV LEFT HEART CATH N/A 4/9/2020    Procedure: Left Heart  Cath;  Surgeon: Evens Figueredo MD;  Location:  HEART CARDIAC CATH LAB     CV PCI ATHERECTOMY ORBITAL N/A 4/9/2020    Procedure: Percutaneous Coronary Intervention Atherectomy Rotational;  Surgeon: Evens Figueredo MD;  Location:  HEART CARDIAC CATH LAB     CV PCI STENT DRUG ELUTING N/A 3/5/2020    Procedure: Percutaneous Coronary Intervention Stent Drug Eluting;  Surgeon: Fabricio Mata MD;  Location:  HEART CARDIAC CATH LAB     CV PCI STENT DRUG ELUTING N/A 4/9/2020    Procedure: Percutaneous Coronary Intervention Stent Drug Eluting;  Surgeon: Evens Figeuredo MD;  Location:  HEART CARDIAC CATH LAB     CV TEMPORARY PACEMAKER INSERTION N/A 3/5/2020    Procedure: Temporary Pacemaker Insertion;  Surgeon: Fabricio Mata MD;  Location:  HEART CARDIAC CATH LAB     CV TEMPORARY PACEMAKER INSERTION N/A 3/5/2020    Procedure: Temporary Pacemaker Insertion;  Surgeon: Fabricio Mata MD;  Location:  HEART CARDIAC CATH LAB       ALLERGIES   No Known Allergies    FAMILY HISTORY  +cardiac disease    SOCIAL HISTORY  Social History     Socioeconomic History     Marital status: Single     Spouse name: Not on file     Number of children: Not on file     Years of education: Not on file     Highest education level: Not on file   Occupational History     Not on file   Tobacco Use     Smoking status: Current Every Day Smoker     Packs/day: 1.00     Smokeless tobacco: Never Used   Substance and Sexual Activity     Alcohol use: Not Currently     Drug use: Not Currently     Sexual activity: Not Currently     Partners: Female   Other Topics Concern     Parent/sibling w/ CABG, MI or angioplasty before 65F 55M? Not Asked   Social History Narrative     Not on file     Social Determinants of Health     Financial Resource Strain: Not on file   Food Insecurity: Not on file   Transportation Needs: Not on file   Physical Activity: Not on file   Stress: Not on file   Social Connections: Not  "on file   Intimate Partner Violence: Not on file   Housing Stability: Not on file       ROS:   Constitutional: No fever, chills, or sweats. No weight gain/loss   ENT: No visual disturbance, ear ache, epistaxis, sore throat  Allergies/Immunologic: Negative  Respiratory: No cough, hemoptysia  Cardiovascular: As per HPI  GI: No nausea, vomiting, hematemesis, melena, or hematochezia  : No urinary frequency, dysuria, or hematuria  Integument: Negative  Psychiatric: Negative  Neuro: Negative  Endocrinology: Negative   Musculoskeletal: Negative  Vascular: No walking impairment, claudication, ischemic rest pain or nonhealing wounds    EXAM:  /85 (BP Location: Left arm, Patient Position: Sitting, Cuff Size: Adult Regular)   Pulse 61   Ht 1.702 m (5' 7\")   Wt 86.5 kg (190 lb 9.6 oz)   BMI 29.85 kg/m    In general, the patient is a pleasant male in no apparent distress.    HEENT: NC/AT.  PERRLA.  EOMI.  Sclerae white, not injected.    Neck: No adenopathy.  No thyromegaly. Carotids +2/2 bilaterally without bruits.  No jugular venous distension.   Heart: RRR. Normal S1, S2 splits physiologically. No murmur, rub, click, or gallop.   Lungs: CTA.  No ronchi, wheezes, rales.  No dullness to percussion.   Abdomen: Soft, nontender, nondistended. No organomegaly. No AAA.  No bruits.   Extremities: No clubbing, cyanosis, or edema.    Vascular: No bruits are noted.    Labs:  LIPID RESULTS:  Lab Results   Component Value Date    CHOL 110 12/01/2021    CHOL 92 11/30/2020    HDL 32 (L) 12/01/2021    HDL 31 (L) 11/30/2020    LDL 26 12/01/2021    LDL 45 11/30/2020    TRIG 262 (H) 12/01/2021    TRIG 78 11/30/2020    NHDL 78 12/01/2021    NHDL 61 11/30/2020       LIVER ENZYME RESULTS:  Lab Results   Component Value Date    AST 20 11/16/2020    ALT 51 11/30/2020       CBC RESULTS:  Lab Results   Component Value Date    WBC 11.1 (H) 12/01/2021    WBC 8.7 04/09/2020    RBC 5.79 12/01/2021    RBC 5.17 04/09/2020    HGB 15.9 12/01/2021 "    HGB 14.3 04/09/2020    HCT 48.0 12/01/2021    HCT 43.7 04/09/2020    MCV 83 12/01/2021    MCV 85 04/09/2020    MCH 27.5 12/01/2021    MCH 27.7 04/09/2020    MCHC 33.1 12/01/2021    MCHC 32.7 04/09/2020    RDW 13.4 12/01/2021    RDW 13.2 04/09/2020     12/01/2021     04/09/2020       BMP RESULTS:  Lab Results   Component Value Date     12/01/2021     11/16/2020    POTASSIUM 4.8 12/01/2021    POTASSIUM 3.9 11/16/2020    CHLORIDE 108 12/01/2021    CHLORIDE 107 11/16/2020    CO2 30 12/01/2021    CO2 26 11/16/2020    ANIONGAP <1 (L) 12/01/2021    ANIONGAP 5 11/16/2020     (H) 12/01/2021    GLC 88 11/16/2020    BUN 20 12/01/2021    BUN 17 11/16/2020    CR 1.02 12/01/2021    CR 0.96 11/16/2020    GFRESTIMATED 86 12/01/2021    GFRESTIMATED >90 11/16/2020    GFRESTBLACK >90 11/16/2020    RINA 8.8 12/01/2021    RINA 8.9 11/16/2020        A1C RESULTS:  Lab Results   Component Value Date    A1C 5.7 (H) 03/06/2020

## 2022-11-19 ENCOUNTER — HEALTH MAINTENANCE LETTER (OUTPATIENT)
Age: 52
End: 2022-11-19

## 2022-12-15 ENCOUNTER — OFFICE VISIT (OUTPATIENT)
Dept: URGENT CARE | Facility: URGENT CARE | Age: 52
End: 2022-12-15
Payer: COMMERCIAL

## 2022-12-15 VITALS
DIASTOLIC BLOOD PRESSURE: 80 MMHG | BODY MASS INDEX: 29.76 KG/M2 | SYSTOLIC BLOOD PRESSURE: 130 MMHG | WEIGHT: 190 LBS | TEMPERATURE: 98.3 F | RESPIRATION RATE: 16 BRPM | HEART RATE: 68 BPM | OXYGEN SATURATION: 100 %

## 2022-12-15 DIAGNOSIS — I25.10 CORONARY ARTERY DISEASE INVOLVING NATIVE CORONARY ARTERY OF NATIVE HEART WITHOUT ANGINA PECTORIS: ICD-10-CM

## 2022-12-15 DIAGNOSIS — H00.014 HORDEOLUM EXTERNUM OF LEFT UPPER EYELID: Primary | ICD-10-CM

## 2022-12-15 PROCEDURE — 99213 OFFICE O/P EST LOW 20 MIN: CPT | Performed by: FAMILY MEDICINE

## 2022-12-15 RX ORDER — ATORVASTATIN CALCIUM 40 MG/1
TABLET, FILM COATED ORAL
Qty: 90 TABLET | Refills: 0 | Status: SHIPPED | OUTPATIENT
Start: 2022-12-15 | End: 2023-03-16

## 2022-12-15 RX ORDER — ERYTHROMYCIN 5 MG/G
0.5 OINTMENT OPHTHALMIC 4 TIMES DAILY
Qty: 14 G | Refills: 0 | Status: SHIPPED | OUTPATIENT
Start: 2022-12-15 | End: 2022-12-22

## 2022-12-15 NOTE — PROGRESS NOTES
EPICSPMNEYESUBJECTIVE:Chief Complaint:   Chief Complaint   Patient presents with     Eye Problem     Pt is having left eye swelling X 3 days       History of Present Illness: Candelario Stahl is a 52 year old male who presents complaining of tender bump on eyelid for few days.  Onset/timing: gradual.   Associated Signs and Symptoms: discomfort   Treatment measures tried include: none   Contact wearer : No    Past Medical History:   Diagnosis Date     CAD (coronary artery disease)      STEMI (ST elevation myocardial infarction) (H)      Third degree heart block (H)      Tobacco abuse 3/31/2020     No Known Allergies  Social History     Tobacco Use     Smoking status: Every Day     Packs/day: 1.00     Types: Cigarettes     Smokeless tobacco: Never   Substance Use Topics     Alcohol use: Not Currently       ROS:  no fevers  no photophobia, vision change  SKIN: no eythema    OBJECTIVE:  /80   Pulse 68   Temp 98.3  F (36.8  C) (Tympanic)   Resp 16   Wt 86.2 kg (190 lb)   SpO2 100%   BMI 29.76 kg/m     General: no acute distress  Eye exam: tender enlarged lump eyelid.  PERRLA, no photophobia, conjunctiva clear      ICD-10-CM    1. Hordeolum externum of left upper eyelid  H00.014 erythromycin (ROMYCIN) 5 MG/GM ophthalmic ointment     Adult Eye  Referral          warms packs, f/u Opthalmology if persists as some sty's need I&D.

## 2022-12-16 NOTE — TELEPHONE ENCOUNTER
My chart message sent to patient to schedule appointment for medication refills.     Lynn Carroll RN  [facility]

## 2022-12-19 ENCOUNTER — TELEPHONE (OUTPATIENT)
Dept: CARDIOLOGY | Facility: CLINIC | Age: 52
End: 2022-12-19

## 2022-12-19 ENCOUNTER — LAB (OUTPATIENT)
Dept: LAB | Facility: CLINIC | Age: 52
End: 2022-12-19
Payer: COMMERCIAL

## 2022-12-19 DIAGNOSIS — I25.10 CORONARY ARTERY DISEASE INVOLVING NATIVE CORONARY ARTERY OF NATIVE HEART WITHOUT ANGINA PECTORIS: ICD-10-CM

## 2022-12-19 DIAGNOSIS — R07.9 CARDIAC CHEST PAIN: ICD-10-CM

## 2022-12-19 LAB
ALBUMIN SERPL-MCNC: 3.7 G/DL (ref 3.4–5)
ALP SERPL-CCNC: 81 U/L (ref 40–150)
ALT SERPL W P-5'-P-CCNC: 54 U/L (ref 0–70)
AST SERPL W P-5'-P-CCNC: 21 U/L (ref 0–45)
BILIRUB DIRECT SERPL-MCNC: 0.1 MG/DL (ref 0–0.2)
BILIRUB SERPL-MCNC: 1.1 MG/DL (ref 0.2–1.3)
CHOLEST SERPL-MCNC: 99 MG/DL
FASTING STATUS PATIENT QL REPORTED: YES
HDLC SERPL-MCNC: 33 MG/DL
LDLC SERPL CALC-MCNC: 47 MG/DL
NONHDLC SERPL-MCNC: 66 MG/DL
PROT SERPL-MCNC: 7 G/DL (ref 6.8–8.8)
TRIGL SERPL-MCNC: 93 MG/DL

## 2022-12-19 PROCEDURE — 80076 HEPATIC FUNCTION PANEL: CPT | Performed by: INTERNAL MEDICINE

## 2022-12-19 PROCEDURE — 36415 COLL VENOUS BLD VENIPUNCTURE: CPT | Performed by: INTERNAL MEDICINE

## 2022-12-19 PROCEDURE — 80061 LIPID PANEL: CPT | Performed by: INTERNAL MEDICINE

## 2022-12-19 RX ORDER — LISINOPRIL 20 MG/1
20 TABLET ORAL DAILY
Qty: 90 TABLET | Refills: 3 | Status: SHIPPED | OUTPATIENT
Start: 2022-12-19 | End: 2023-12-13

## 2022-12-19 RX ORDER — ISOSORBIDE MONONITRATE 30 MG/1
30 TABLET, EXTENDED RELEASE ORAL DAILY
Qty: 90 TABLET | Refills: 3 | Status: SHIPPED | OUTPATIENT
Start: 2022-12-19 | End: 2023-07-17

## 2022-12-19 RX ORDER — METOPROLOL TARTRATE 25 MG/1
25 TABLET, FILM COATED ORAL 2 TIMES DAILY
Qty: 180 TABLET | Refills: 3 | Status: SHIPPED | OUTPATIENT
Start: 2022-12-19 | End: 2024-01-09

## 2022-12-19 NOTE — TELEPHONE ENCOUNTER
Pt called stating that he needed refills of his medication and was wondering if he was due for a visit. Per Dr. Esparza note, patient is not due till 9/2023. Medications refilled. Labs drawn this morning.

## 2023-04-15 ENCOUNTER — HEALTH MAINTENANCE LETTER (OUTPATIENT)
Age: 53
End: 2023-04-15

## 2023-05-31 ENCOUNTER — TELEPHONE (OUTPATIENT)
Dept: CARDIOLOGY | Facility: CLINIC | Age: 53
End: 2023-05-31
Payer: COMMERCIAL

## 2023-05-31 DIAGNOSIS — I25.10 CORONARY ARTERY DISEASE INVOLVING NATIVE CORONARY ARTERY OF NATIVE HEART WITHOUT ANGINA PECTORIS: ICD-10-CM

## 2023-05-31 RX ORDER — ATORVASTATIN CALCIUM 40 MG/1
40 TABLET, FILM COATED ORAL DAILY
Qty: 90 TABLET | Refills: 0 | Status: SHIPPED | OUTPATIENT
Start: 2023-05-31 | End: 2023-07-17

## 2023-05-31 NOTE — TELEPHONE ENCOUNTER
Refills were sent by Dr. Salazar on 12/19/22 for metoprolol, isosorbide, brilinta, and lisinopril for 1 year. Should have refills on file and available. Atorvastatin is due for a refill, so will send that to [facility] pharmacy.  Attempted to reach Candelario, no answer, got VM.  Left a message with the above information, and requested he verify with the pharmacy, and if needing refills sent, to call back.

## 2023-05-31 NOTE — TELEPHONE ENCOUNTER
[facility] Call Center    Phone Message    May a detailed message be left on voicemail: yes     Reason for Call: Medication Refill Request    Has the patient contacted the pharmacy for the refill? Yes   Name of medication being requested: Patient states all of his medications are needing to be filled.  Provider who prescribed the medication: N/A  Pharmacy: [facility] - [place], [place] - [id number]  Date medication is needed: 5/31/23    Action Taken: Message routed to:  Other: Cardiology    Travel Screening: Not Applicable     Thank you!  [facility]

## 2023-07-17 ENCOUNTER — OFFICE VISIT (OUTPATIENT)
Dept: CARDIOLOGY | Facility: CLINIC | Age: 53
End: 2023-07-17
Payer: COMMERCIAL

## 2023-07-17 VITALS
HEART RATE: 60 BPM | SYSTOLIC BLOOD PRESSURE: 137 MMHG | DIASTOLIC BLOOD PRESSURE: 86 MMHG | WEIGHT: 172.9 LBS | HEIGHT: 67 IN | BODY MASS INDEX: 27.14 KG/M2

## 2023-07-17 DIAGNOSIS — I25.10 CORONARY ARTERY DISEASE INVOLVING NATIVE CORONARY ARTERY OF NATIVE HEART WITHOUT ANGINA PECTORIS: Primary | ICD-10-CM

## 2023-07-17 PROCEDURE — 99214 OFFICE O/P EST MOD 30 MIN: CPT | Performed by: PHYSICIAN ASSISTANT

## 2023-07-17 RX ORDER — ATORVASTATIN CALCIUM 40 MG/1
40 TABLET, FILM COATED ORAL DAILY
Qty: 90 TABLET | Refills: 3 | Status: SHIPPED | OUTPATIENT
Start: 2023-07-17

## 2023-07-17 RX ORDER — ISOSORBIDE MONONITRATE 60 MG/1
60 TABLET, EXTENDED RELEASE ORAL DAILY
Qty: 90 TABLET | Refills: 3 | Status: SHIPPED | OUTPATIENT
Start: 2023-07-17 | End: 2024-07-05

## 2023-07-17 NOTE — PATIENT INSTRUCTIONS
"Thank you for your [facility] visit today. Your provider has recommended the following:  Medication Changes:  Let's try increasing your imdur to 60 mg daily. I sent in a new prescription for this dose. In the meantime, you can take two of the 30 mg tablets you have at home.   Recommendations:  -  We'll do an echo to recheck the heart function given your shortness of breath.   -  We'll let you know results and see how you're doing on the higher dose of imdur. If worsening shortness of breath or new chest pain, please let me know. We'll consider evaluation of your heart arteries again.  -  make sure you're eating in the morning to avoid dizziness.   -  see Dr. Salazar in 6 months - sooner follow up if needed for symptoms  Reminder:  Please bring in all current medications, over the counter supplements and vitamin bottles to your next appointment.  Important \"Adalberto Cook\" telephone numbers for your reference:  Cardiology Scheduling - [phone]  Cardiology Clinic RN-  [phone]     [facility]    "

## 2023-07-17 NOTE — PROGRESS NOTES
Cardiology Progress Note    Patient seen today in follow up of: CAD  Primary cardiologist: Dr. Salazar    HPI:  Candelario Stahl is a very pleasant 52 year old male with a history of hypertension, ongoing tobacco use, and coronary artery disease with a STEMI in March 2020.     Candelario He presented on 3/5/2020 to the emergency room and was found to have an acute ST elevation MI and third-degree heart block.  He was taken emergently to the Cath Lab where a temporary pacer was placed.  Coronary angiography showed a 100% RCA occlusion.  This was treated with 3 drug-eluting stents to the proximal and mid RCA.  He had a residual 70% stenosis in the proximal to mid LAD, a 60% lesion in the distal circumflex, and a 60% OM3 lesion.  He continued to have recurrent chest pain and inferior changes on his EKG and was brought back to the Cath Lab where he was found to have stent thrombosis of the mid RCA stent.  He underwent high-pressure balloon angioplasty.  His pacer wire was removed the following day.  His follow-up echo showed preserved LV function overall but severe hypokinesis of the basal inferior wall.      He was seen in follow-up and continued to have chest discomfort with exertion.  He ultimately underwent a staged intervention to his residual proximal LAD lesion in April.  He had PCI of the ostial and mid LAD and D1 bifurcation with rotational atherectomy and stenting with 3 drug-eluting stents using a DK crush technique.  Following that intervention, his symptoms resolved.      I saw him in 2021 at which point he was complaining of heartburn and some warmness in his chest.  He felt the symptoms were similar to his prior heart attack.  He was also having some dizziness and fatigue.  I cut back his metoprolol, started him on Imdur and ordered a stress test.  He had a stress test on 4/12/2022.  This was an exercise Lexiscan stress test.  He had 1 mm upsloping ST segment depression in the inferolateral leads on EKG.   Nuclear imaging showed a nontransmural infarct in the mid to basal inferolateral segment of the LV associated with a mild degree of anne-infarct ischemia.  EF was normal.  When I saw him back in follow-up, he was feeling significantly better.  He had no further episodes of chest discomfort or heartburn.  His fatigue and dizziness had essentially resolved.  We elected to continue current medical management.  He has been on lifelong dual antiplatelet therapy.    He saw Dr. Salazar in September of last year.  No medication changes were made at that time.  She suggested if he had a recurrent angina despite adequate BP control, we could consider intervention to his circumflex and OM disease.    He returns today for routine cardiology follow-up.  He overall has been doing okay.  He denies any chest discomfort recently.  He does feel more short of breath at times.  This is sometimes associated with exertion but not always.  He admits he is not as active as he used to be.  He is also been under a significant amount of stress.  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  He is required recent surgeries.  Candelario is his only family around to take care of him.    He denies any orthopnea, PND or peripheral edema.  He is fatigued and not sleeping well at all.  He does have some dizziness if he does not eat anything in the morning.  As long as he eats, he has no dizziness whatsoever.  No presyncope or syncope.    He unfortunately continues to smoke. He smokes 1/2 PPD typically on average.     ASSESSMENT/PLAN:  Candelario Stahl is a very pleasant 52 year old male with a history of coronary artery disease with an inferior STEMI status post 3 stents to the proximal and mid RCA followed by staged intervention to the ostial and mid LAD and D1 bifurcation.  He is to remain on lifelong dual antiplatelet therapy as long as tolerated.  He has known circumflex and OM disease which we are medically managing for now.  Can  consider intervention of the future if he has ongoing anginal symptoms.    Today, he denies any chest discomfort which he had prior to his stents.  He has some more noticeable dyspnea on exertion recently.  He does admittedly not as active as he used to be.  No heart failure symptoms.  He unfortunately continues to smoke, at least a half a pack a day. It has been difficult for him to quit admittedly with ongoing life stressors.     He is on appropriate medical therapy with DAPT, high intensity statin therapy, beta blocker and lisinopril. He is also on imdur 30 mg daily. LDL in December was well controlled at 47. BP in clinic today at 137/86.     Given his dyspnea, will check an echocardiogram. Additionally, will increase imdur to 60 mg daily. Will follow up on his symptoms once his echo is done. He has no chest pain which has been his anginal symptoms but could consider further ischemic evaluation. Additionally, consider pulmonary work up given his tobacco use. Discussed the importance of tobacco cessation. This has been difficult for him.     He has not had a primary doctor. I suggested he establish with one.    We'll await his echocardiogram results and see how he is doing at that time. As always, if he has any questions or concerns or certainly any worsening symptoms in the meantime, I asked him to contact us.    Orders this Visit:  No orders of the defined types were placed in this encounter.    No orders of the defined types were placed in this encounter.    There are no discontinued medications.    CURRENT MEDICATIONS:  Current Outpatient Medications   Medication Sig Dispense Refill     ASPIRIN ADULT LOW STRENGTH 81 MG EC tablet TAKE 1 TABLET (81 MG) BY MOUTH DAILY 100 tablet 2     atorvastatin (LIPITOR) 40 MG tablet Take 1 tablet (40 mg) by mouth daily 90 tablet 0     cetirizine (ZYRTEC) 10 MG tablet Take 1 tablet (10 mg) by mouth daily Appointment needed to establish care with new primary provider 30 tablet  0     isosorbide mononitrate (IMDUR) 30 MG 24 hr tablet Take 1 tablet (30 mg) by mouth daily 90 tablet 3     lisinopril (ZESTRIL) 20 MG tablet Take 1 tablet (20 mg) by mouth daily 90 tablet 3     metoprolol tartrate (LOPRESSOR) 25 MG tablet Take 1 tablet (25 mg) by mouth 2 times daily 180 tablet 3     multivitamin, therapeutic (THERA-VIT) TABS tablet Take 1 tablet by mouth Takes when remembers, usually once weekly.       nitroGLYcerin (NITROSTAT) 0.4 MG sublingual tablet For chest pain place 1 tablet under the tongue every 5 minutes for 3 doses. If symptoms persist 5 minutes after 2 nd dose call 911. 30 tablet 1     ticagrelor (BRILINTA) 60 MG tablet Take 1 tablet (60 mg) by mouth 2 times daily 180 tablet 3     ALLERGIES  No Known Allergies  PAST MEDICAL HISTORY:  Past Medical History:   Diagnosis Date     CAD (coronary artery disease)      STEMI (ST elevation myocardial infarction) (H)      Third degree heart block (H)      Tobacco abuse 3/31/2020     PAST SURGICAL HISTORY:  Past Surgical History:   Procedure Laterality Date     CV CORONARY ANGIOGRAM N/A 3/5/2020    Procedure: Coronary Angiogram;  Surgeon: Fabricio Mata MD;  Location:  HEART CARDIAC CATH LAB     CV CORONARY ANGIOGRAM N/A 4/9/2020    Procedure: Coronary Angiogram;  Surgeon: Evens Figueredo MD;  Location:  HEART CARDIAC CATH LAB     CV HEART CATHETERIZATION WITH POSSIBLE INTERVENTION N/A 3/5/2020    Procedure: Coronary Angiogram;  Surgeon: Fabricio Mata MD;  Location:  HEART CARDIAC CATH LAB     CV HEART CATHETERIZATION WITH POSSIBLE INTERVENTION N/A 3/5/2020    Procedure: Heart Catheterization with Possible Intervention;  Surgeon: Fabricio Mata MD;  Location:  HEART CARDIAC CATH LAB     CV INTRAVASULAR ULTRASOUND N/A 4/9/2020    Procedure: Intravascular Ultrasound;  Surgeon: Evens Figueredo MD;  Location:  HEART CARDIAC CATH LAB     CV LEFT HEART CATH N/A 4/9/2020    Procedure: Left Heart Cath;   "Surgeon: Evens Figueredo MD;  Location:  HEART CARDIAC CATH LAB     CV PCI ATHERECTOMY ORBITAL N/A 4/9/2020    Procedure: Percutaneous Coronary Intervention Atherectomy Rotational;  Surgeon: Evens Figueredo MD;  Location:  HEART CARDIAC CATH LAB     CV PCI STENT DRUG ELUTING N/A 3/5/2020    Procedure: Percutaneous Coronary Intervention Stent Drug Eluting;  Surgeon: Fabricio Mata MD;  Location:  HEART CARDIAC CATH LAB     CV PCI STENT DRUG ELUTING N/A 4/9/2020    Procedure: Percutaneous Coronary Intervention Stent Drug Eluting;  Surgeon: Evens Figueredo MD;  Location:  HEART CARDIAC CATH LAB     CV TEMPORARY PACEMAKER INSERTION N/A 3/5/2020    Procedure: Temporary Pacemaker Insertion;  Surgeon: Fabricio Mata MD;  Location:  HEART CARDIAC CATH LAB     CV TEMPORARY PACEMAKER INSERTION N/A 3/5/2020    Procedure: Temporary Pacemaker Insertion;  Surgeon: Fabricio Mata MD;  Location:  HEART CARDIAC CATH LAB     SOCIAL HISTORY:  Social History     Socioeconomic History     Marital status: Single     Spouse name: None     Number of children: None     Years of education: None     Highest education level: None   Tobacco Use     Smoking status: Every Day     Packs/day: 1.00     Types: Cigarettes     Smokeless tobacco: Never   Substance and Sexual Activity     Alcohol use: Not Currently     Drug use: Not Currently     Sexual activity: Not Currently     Partners: Female     Review of Systems:  Skin:        Eyes:       ENT:       Respiratory:       Cardiovascular:       Gastroenterology:      Genitourinary:       Musculoskeletal:       Neurologic:       Psychiatric:       Heme/Lymph/Imm:       Endocrine:          Physical Exam:  Vitals: /86   Pulse 60   Ht 1.702 m (5' 7\")   Wt 78.4 kg (172 lb 14.4 oz)   BMI 27.08 kg/m     Wt Readings from Last 4 Encounters:   07/17/23 78.4 kg (172 lb 14.4 oz)   12/15/22 86.2 kg (190 lb)   09/16/22 86.5 kg (190 lb 9.6 oz) "   04/19/22 87.1 kg (192 lb 1.6 oz)     GEN: well nourished, in no acute distress.  EXTREM: no LE edema.    Radha Gallardo PA-C  [facility]

## 2023-07-17 NOTE — LETTER
7/17/2023    Physician No Ref-Primary  No address on file    RE: Candelario Stahl       Dear Colleague,     I had the pleasure of seeing Candelario Stahl in the [facility].    Cardiology Progress Note    Patient seen today in follow up of: CAD  Primary cardiologist: Dr. Salazar    HPI:  Candelario Stahl is a very pleasant 52 year old male with a history of hypertension, ongoing tobacco use, and coronary artery disease with a STEMI in March 2020.     Candelario Meneses presented on 3/5/2020 to the emergency room and was found to have an acute ST elevation MI and third-degree heart block.  He was taken emergently to the Cath Lab where a temporary pacer was placed.  Coronary angiography showed a 100% RCA occlusion.  This was treated with 3 drug-eluting stents to the proximal and mid RCA.  He had a residual 70% stenosis in the proximal to mid LAD, a 60% lesion in the distal circumflex, and a 60% OM3 lesion.  He continued to have recurrent chest pain and inferior changes on his EKG and was brought back to the Cath Lab where he was found to have stent thrombosis of the mid RCA stent.  He underwent high-pressure balloon angioplasty.  His pacer wire was removed the following day.  His follow-up echo showed preserved LV function overall but severe hypokinesis of the basal inferior wall.      He was seen in follow-up and continued to have chest discomfort with exertion.  He ultimately underwent a staged intervention to his residual proximal LAD lesion in April.  He had PCI of the ostial and mid LAD and D1 bifurcation with rotational atherectomy and stenting with 3 drug-eluting stents using a DK crush technique.  Following that intervention, his symptoms resolved.      I saw him in 2021 at which point he was complaining of heartburn and some warmness in his chest.  He felt the symptoms were similar to his prior heart attack.  He was also having some dizziness and fatigue.  I cut back his metoprolol, started him on  Imdur and ordered a stress test.  He had a stress test on 4/12/2022.  This was an exercise Lexiscan stress test.  He had 1 mm upsloping ST segment depression in the inferolateral leads on EKG.  Nuclear imaging showed a nontransmural infarct in the mid to basal inferolateral segment of the LV associated with a mild degree of anne-infarct ischemia.  EF was normal.  When I saw him back in follow-up, he was feeling significantly better.  He had no further episodes of chest discomfort or heartburn.  His fatigue and dizziness had essentially resolved.  We elected to continue current medical management.  He has been on lifelong dual antiplatelet therapy.    He saw Dr. Salazar in September of last year.  No medication changes were made at that time.  She suggested if he had a recurrent angina despite adequate BP control, we could consider intervention to his circumflex and OM disease.    He returns today for routine cardiology follow-up.  He overall has been doing okay.  He denies any chest discomfort recently.  He does feel more short of breath at times.  This is sometimes associated with exertion but not always.  He admits he is not as active as he used to be.  He is also been under a significant amount of stress.  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  He is required recent surgeries.  Candelario is his only family around to take care of him.    He denies any orthopnea, PND or peripheral edema.  He is fatigued and not sleeping well at all.  He does have some dizziness if he does not eat anything in the morning.  As long as he eats, he has no dizziness whatsoever.  No presyncope or syncope.    He unfortunately continues to smoke. He smokes 1/2 PPD typically on average.     ASSESSMENT/PLAN:  Candelario Stahl is a very pleasant 52 year old male with a history of coronary artery disease with an inferior STEMI status post 3 stents to the proximal and mid RCA followed by staged intervention to the ostial and mid  LAD and D1 bifurcation.  He is to remain on lifelong dual antiplatelet therapy as long as tolerated.  He has known circumflex and OM disease which we are medically managing for now.  Can consider intervention of the future if he has ongoing anginal symptoms.    Today, he denies any chest discomfort which he had prior to his stents.  He has some more noticeable dyspnea on exertion recently.  He does admittedly not as active as he used to be.  No heart failure symptoms.  He unfortunately continues to smoke, at least a half a pack a day. It has been difficult for him to quit admittedly with ongoing life stressors.     He is on appropriate medical therapy with DAPT, high intensity statin therapy, beta blocker and lisinopril. He is also on imdur 30 mg daily. LDL in December was well controlled at 47. BP in clinic today at 137/86.     Given his dyspnea, will check an echocardiogram. Additionally, will increase imdur to 60 mg daily. Will follow up on his symptoms once his echo is done. He has no chest pain which has been his anginal symptoms but could consider further ischemic evaluation. Additionally, consider pulmonary work up given his tobacco use. Discussed the importance of tobacco cessation. This has been difficult for him.     He has not had a primary doctor. I suggested he establish with one.    We'll await his echocardiogram results and see how he is doing at that time. As always, if he has any questions or concerns or certainly any worsening symptoms in the meantime, I asked him to contact us.    Orders this Visit:  No orders of the defined types were placed in this encounter.    No orders of the defined types were placed in this encounter.    There are no discontinued medications.    CURRENT MEDICATIONS:  Current Outpatient Medications   Medication Sig Dispense Refill    ASPIRIN ADULT LOW STRENGTH 81 MG EC tablet TAKE 1 TABLET (81 MG) BY MOUTH DAILY 100 tablet 2    atorvastatin (LIPITOR) 40 MG tablet Take 1  tablet (40 mg) by mouth daily 90 tablet 0    cetirizine (ZYRTEC) 10 MG tablet Take 1 tablet (10 mg) by mouth daily Appointment needed to establish care with new primary provider 30 tablet 0    isosorbide mononitrate (IMDUR) 30 MG 24 hr tablet Take 1 tablet (30 mg) by mouth daily 90 tablet 3    lisinopril (ZESTRIL) 20 MG tablet Take 1 tablet (20 mg) by mouth daily 90 tablet 3    metoprolol tartrate (LOPRESSOR) 25 MG tablet Take 1 tablet (25 mg) by mouth 2 times daily 180 tablet 3    multivitamin, therapeutic (THERA-VIT) TABS tablet Take 1 tablet by mouth Takes when remembers, usually once weekly.      nitroGLYcerin (NITROSTAT) 0.4 MG sublingual tablet For chest pain place 1 tablet under the tongue every 5 minutes for 3 doses. If symptoms persist 5 minutes after 2 nd dose call 911. 30 tablet 1    ticagrelor (BRILINTA) 60 MG tablet Take 1 tablet (60 mg) by mouth 2 times daily 180 tablet 3     ALLERGIES  No Known Allergies  PAST MEDICAL HISTORY:  Past Medical History:   Diagnosis Date    CAD (coronary artery disease)     STEMI (ST elevation myocardial infarction) (H)     Third degree heart block (H)     Tobacco abuse 3/31/2020     PAST SURGICAL HISTORY:  Past Surgical History:   Procedure Laterality Date    CV CORONARY ANGIOGRAM N/A 3/5/2020    Procedure: Coronary Angiogram;  Surgeon: Fabricio Mata MD;  Location: [facility] CARDIAC CATH LAB    CV CORONARY ANGIOGRAM N/A 4/9/2020    Procedure: Coronary Angiogram;  Surgeon: Evens Figueredo MD;  Location: [facility] CARDIAC CATH LAB    CV HEART CATHETERIZATION WITH POSSIBLE INTERVENTION N/A 3/5/2020    Procedure: Coronary Angiogram;  Surgeon: Fabricio Mata MD;  Location: [facility] CARDIAC CATH LAB    CV HEART CATHETERIZATION WITH POSSIBLE INTERVENTION N/A 3/5/2020    Procedure: Heart Catheterization with Possible Intervention;  Surgeon: Fabricio Mata MD;  Location: [facility] CARDIAC CATH LAB    CV INTRAVASULAR ULTRASOUND N/A 4/9/2020     "Procedure: Intravascular Ultrasound;  Surgeon: Evens Figueredo MD;  Location:  HEART CARDIAC CATH LAB    CV LEFT HEART CATH N/A 4/9/2020    Procedure: Left Heart Cath;  Surgeon: Evens Figueredo MD;  Location:  HEART CARDIAC CATH LAB    CV PCI ATHERECTOMY ORBITAL N/A 4/9/2020    Procedure: Percutaneous Coronary Intervention Atherectomy Rotational;  Surgeon: Evens Figueredo MD;  Location:  HEART CARDIAC CATH LAB    CV PCI STENT DRUG ELUTING N/A 3/5/2020    Procedure: Percutaneous Coronary Intervention Stent Drug Eluting;  Surgeon: Fabricio Mata MD;  Location:  HEART CARDIAC CATH LAB    CV PCI STENT DRUG ELUTING N/A 4/9/2020    Procedure: Percutaneous Coronary Intervention Stent Drug Eluting;  Surgeon: Evens Figueredo MD;  Location:  HEART CARDIAC CATH LAB    CV TEMPORARY PACEMAKER INSERTION N/A 3/5/2020    Procedure: Temporary Pacemaker Insertion;  Surgeon: Fabricio Mata MD;  Location:  HEART CARDIAC CATH LAB    CV TEMPORARY PACEMAKER INSERTION N/A 3/5/2020    Procedure: Temporary Pacemaker Insertion;  Surgeon: Fabricio Mata MD;  Location:  HEART CARDIAC CATH LAB     SOCIAL HISTORY:  Social History     Socioeconomic History    Marital status: Single     Spouse name: None    Number of children: None    Years of education: None    Highest education level: None   Tobacco Use    Smoking status: Every Day     Packs/day: 1.00     Types: Cigarettes    Smokeless tobacco: Never   Substance and Sexual Activity    Alcohol use: Not Currently    Drug use: Not Currently    Sexual activity: Not Currently     Partners: Female     Review of Systems:  Skin:        Eyes:       ENT:       Respiratory:       Cardiovascular:       Gastroenterology:      Genitourinary:       Musculoskeletal:       Neurologic:       Psychiatric:       Heme/Lymph/Imm:       Endocrine:          Physical Exam:  Vitals: /86   Pulse 60   Ht 1.702 m (5' 7\")   Wt 78.4 kg (172 lb 14.4 " oz)   BMI 27.08 kg/m     Wt Readings from Last 4 Encounters:   07/17/23 78.4 kg (172 lb 14.4 oz)   12/15/22 86.2 kg (190 lb)   09/16/22 86.5 kg (190 lb 9.6 oz)   04/19/22 87.1 kg (192 lb 1.6 oz)     GEN: well nourished, in no acute distress.  EXTREM: no LE edema.    Radha Gallardo PA-C  [facility]      Thank you for allowing me to participate in the care of your patient.      Sincerely,     Radha Gallardo PA-C     [facility]  cc:   No referring provider defined for this encounter.

## 2023-08-01 ENCOUNTER — HOSPITAL ENCOUNTER (OUTPATIENT)
Dept: CARDIOLOGY | Facility: CLINIC | Age: 53
Discharge: HOME | End: 2023-08-01
Attending: PHYSICIAN ASSISTANT | Admitting: PHYSICIAN ASSISTANT
Payer: COMMERCIAL

## 2023-08-01 DIAGNOSIS — I25.10 CORONARY ARTERY DISEASE INVOLVING NATIVE CORONARY ARTERY OF NATIVE HEART WITHOUT ANGINA PECTORIS: ICD-10-CM

## 2023-08-01 LAB — LVEF ECHO: NORMAL

## 2023-08-01 PROCEDURE — 999N000208 ECHOCARDIOGRAM COMPLETE

## 2023-08-01 PROCEDURE — 93306 TTE W/DOPPLER COMPLETE: CPT | Mod: 26 | Performed by: INTERNAL MEDICINE

## 2023-08-01 PROCEDURE — 255N000002 HC RX 255 OP 636: Performed by: PHYSICIAN ASSISTANT

## 2023-08-01 RX ADMIN — HUMAN ALBUMIN MICROSPHERES AND PERFLUTREN 9 ML: 10; .22 INJECTION, SOLUTION INTRAVENOUS at 09:38

## 2023-08-02 ENCOUNTER — TELEPHONE (OUTPATIENT)
Dept: CARDIOLOGY | Facility: CLINIC | Age: 53
End: 2023-08-02
Payer: COMMERCIAL

## 2023-08-02 NOTE — TELEPHONE ENCOUNTER
Radha Gallardo PA-C McMahon, Bullis Mary, RN  [place] - please let Candelario know his echo looks great. Heart function is normal and no valvular issues. Will you see how his dyspnea is on the higher dose of imdur? Thanks.    ECHOCARDIOGRAM 8/1/2023  HR: 64  BP: 145/87 mmHg    Procedure  Complete Echo Adult. Optison (NDC #[id number]) given intravenously.     Interpretation Summary     1. Left ventricular systolic function is normal. The visual ejection fraction  is 60-65%.  2. No regional wall motion abnormalities noted.  3. The right ventricle is normal in structure, function and size.  4. No evidence for signfiicant valvular pathology.  =============================================    8/2/23 Called to patient. Left message to call back with how he is doing on imdur.  Suyapa Zarco RN 08/02/23 12:59 PM     8/31/2 Called to patient. Left message to call back to review.   Letter drafted and sent.  Suyapa Zarco RN 08/31/23 12:56 PM

## 2023-08-02 NOTE — LETTER
2023       TO: Candelario Stahl   [address] [facility] [address] [facility] [address]       Dear Mr. Stahl,    Advanced Practice Provider (OTONIEL) Ottoniel Lenz PA-C reviewed your echocardiogram results.  Rebecca wanted to let you know your echocardiogram looks great with a normal heart function and no valvular issues. Let us know you are doing on the higher dose of imdur (isosorbide mononitrate) and if whether you are still experiencing shortness of breath.    Sincerely,    Nurse Team with Ottoniel Lenz PA-C  [facility]  [phone]      Reading Physician Reading Date Result Priority   Berkley Bae MD  [phone] 2023      Narrative & Impression  [id number]  Formerly [facility]  [id number]  [id number]^YOANNA^OTTONIEL^OLVIN     [facility]  [facility]ch[facility]ography [facility]  [address]  [place] [address]  Phone [phone]  Fax [phone]     Name: CANDELARIO STAHL  MRN: [id number]  : 1970  Study Date: 2023 08:46 AM  Age: 52 yrs  Gender: Male  Patient Location: [facility]  Reason For Study: Coronary artery disease involving native coronary artery of  david  Ordering Physician: OTTONIEL LENZ  Referring Physician: OTTONIEL LENZ  Performed By: Meg Canseco     BSA: 1.9 m2  Height: 67 in  Weight: 172 lb  HR: 64  BP: 145/87 mmHg  ______________________________________________________________________________  Procedure  Complete Echo Adult. Optison (NDC #[id number]) given intravenously.     ______________________________________________________________________________  Interpretation Summary     1. Left ventricular systolic function is normal. The visual ejection fraction  is 60-65%.  2. No regional wall motion abnormalities noted.  3. The right ventricle is normal in structure, function and size.  4. No evidence for signfiicant valvular  pathology.  ______________________________________________________________________________  Left Ventricle  The left ventricle is normal in size. There is mild concentric left  ventricular hypertrophy. Left ventricular systolic function is normal. The  visual ejection fraction is 60-65%. Left ventricular diastolic function is  normal. No regional wall motion abnormalities noted.     Right Ventricle  The right ventricle is normal in structure, function and size.     Atria  Normal left atrial size. Right atrial size is normal. There is no color  Doppler evidence of an atrial shunt.     Mitral Valve  The mitral valve is normal in structure and function. There is trace mitral  regurgitation.     Tricuspid Valve  The tricuspid valve is normal in structure and function.     Aortic Valve  The aortic valve is trileaflet with aortic valve sclerosis.     Pulmonic Valve  The pulmonic valve is not well seen, but is grossly normal.     Vessels  Normal size aorta. IVC diameter <2.1 cm collapsing >50% with sniff suggests a  normal RA pressure of 3 mmHg.     Pericardium  There is no pericardial effusion.     Rhythm  Sinus rhythm was noted.     ______________________________________________________________________________  MMode/2D Measurements & Calculations  IVSd: 1.2 cm  LVIDd: 4.2 cm  LVIDs: 2.9 cm  LVPWd: 1.1 cm  FS: 31.0 %  LV mass(C)d: 167.4 grams  LV mass(C)dI: 88.3 grams/m2  Ao root diam: 3.2 cm  LA dimension: 3.3 cm     asc Aorta Diam: 3.4 cm  LA/Ao: 1.0  LVOT diam: 1.9 cm  LVOT area: 2.8 cm2  RV Base: 3.7 cm  RWT: 0.52  TAPSE: 1.9 cm     Doppler Measurements & Calculations  MV E max francis: 78.1 cm/sec  MV A max francis: 74.9 cm/sec  MV E/A: 1.0  MV dec time: 0.20 sec  Ao V2 max: 133.0 cm/sec  Ao max P.0 mmHg  Ao V2 mean: 87.7 cm/sec  Ao mean P.0 mmHg  Ao V2 VTI: 26.4 cm  DUANE(I,D): 2.5 cm2  DUANE(V,D): 2.6 cm2  LV V1 max P.0 mmHg  LV V1 max: 122.0 cm/sec  LV V1 VTI: 23.6 cm  SV(LVOT): 66.9 ml  SI(LVOT): 35.3 ml/m2  PA  acc time: 0.11 sec  AV Amrit Ratio (DI): 0.92  DUANE Index (cm2/m2): 1.3  E/E' av.2     Lateral E/e': 7.3  Medial E/e': 9.2  RV S Amrit: 12.1 cm/sec     ______________________________________________________________________________  Report approved by: Chris Meza 2023 10:54 AM

## 2023-12-12 DIAGNOSIS — I25.10 CORONARY ARTERY DISEASE INVOLVING NATIVE CORONARY ARTERY OF NATIVE HEART WITHOUT ANGINA PECTORIS: ICD-10-CM

## 2023-12-13 RX ORDER — LISINOPRIL 20 MG/1
20 TABLET ORAL DAILY
Qty: 90 TABLET | Refills: 2 | Status: SHIPPED | OUTPATIENT
Start: 2023-12-13 | End: 2024-05-08

## 2024-01-05 ENCOUNTER — LAB (OUTPATIENT)
Dept: LAB | Facility: CLINIC | Age: 54
End: 2024-01-05
Payer: COMMERCIAL

## 2024-01-05 DIAGNOSIS — I25.10 CORONARY ARTERY DISEASE INVOLVING NATIVE CORONARY ARTERY OF NATIVE HEART WITHOUT ANGINA PECTORIS: ICD-10-CM

## 2024-01-05 LAB
CHOLEST SERPL-MCNC: 99 MG/DL
FASTING STATUS PATIENT QL REPORTED: YES
HDLC SERPL-MCNC: 35 MG/DL
LDLC SERPL CALC-MCNC: 48 MG/DL
NONHDLC SERPL-MCNC: 64 MG/DL
TRIGL SERPL-MCNC: 78 MG/DL

## 2024-01-05 PROCEDURE — 36415 COLL VENOUS BLD VENIPUNCTURE: CPT | Performed by: PHYSICIAN ASSISTANT

## 2024-01-05 PROCEDURE — 80061 LIPID PANEL: CPT | Performed by: PHYSICIAN ASSISTANT

## 2024-01-09 ENCOUNTER — OFFICE VISIT (OUTPATIENT)
Dept: CARDIOLOGY | Facility: CLINIC | Age: 54
End: 2024-01-09
Attending: PHYSICIAN ASSISTANT
Payer: COMMERCIAL

## 2024-01-09 VITALS
OXYGEN SATURATION: 98 % | HEART RATE: 59 BPM | BODY MASS INDEX: 27.15 KG/M2 | DIASTOLIC BLOOD PRESSURE: 88 MMHG | WEIGHT: 173 LBS | HEIGHT: 67 IN | SYSTOLIC BLOOD PRESSURE: 138 MMHG

## 2024-01-09 DIAGNOSIS — I25.10 CORONARY ARTERY DISEASE INVOLVING NATIVE CORONARY ARTERY OF NATIVE HEART, UNSPECIFIED WHETHER ANGINA PRESENT: ICD-10-CM

## 2024-01-09 DIAGNOSIS — R07.9 CARDIAC CHEST PAIN: ICD-10-CM

## 2024-01-09 DIAGNOSIS — I25.10 CORONARY ARTERY DISEASE INVOLVING NATIVE CORONARY ARTERY OF NATIVE HEART WITHOUT ANGINA PECTORIS: ICD-10-CM

## 2024-01-09 PROCEDURE — 99215 OFFICE O/P EST HI 40 MIN: CPT | Performed by: INTERNAL MEDICINE

## 2024-01-09 RX ORDER — NITROGLYCERIN 0.4 MG/1
TABLET SUBLINGUAL
Qty: 30 TABLET | Refills: 3 | Status: SHIPPED | OUTPATIENT
Start: 2024-01-09

## 2024-01-09 RX ORDER — METOPROLOL TARTRATE 25 MG/1
25 TABLET, FILM COATED ORAL 2 TIMES DAILY
Qty: 180 TABLET | Refills: 3 | Status: SHIPPED | OUTPATIENT
Start: 2024-01-09

## 2024-01-09 NOTE — LETTER
1/9/2024    Physician No Ref-Primary  No address on file    RE: Candelario Stahl       Dear Colleague,     I had the pleasure of seeing Candelario Stahl in the [facility].            HPI:     This is a pleasant 53-year-old man from [place] with a past medical history significant for hypertension, tobacco abuse, and family history of coronary artery disease.     From prior records: On 3/5/2020 he presented to the ED with an acute ST elevation MI and third-degree heart block.  He was taken emergently to the Cath Lab where a temporary pacemaker was placed.  He angiogram showed a 100% RCA occlusion which was treated with 3 drug-eluting stents to the proximal and mid RCA.  There was a residual 70% stenosis in the proximal to mid LAD and 60% distal circumflex and a 60% OM 3 lesion. He continued to have recurrent chest pain with inferior changes noted on his EKG post procedure and was brought back to the Cath Lab where he was found to have stent thrombosis of the mid RCA stent.  He underwent high-pressure balloon angioplasty with restoration of LETTY-3 flow.  His pacemaker wire was removed the next day.  He had a follow-up echo which showed overall preserved LV function but possible severe hypokinesis of the basal inferior wall.  RV function was normal.  He was discharged on Brilinta, aspirin, metoprolol, lisinopril, and atorvastatin.      He had a follow-up visit with LUIS Kaur on 3/31/2020.  He was experiencing chest discomfort with exertion that resolved with rest and ultimately it was recommended he undergo a staged intervention to the his residual proximal LAD.  On 4/9 he underwent PCI of the ostial and mid LAD and D1 bifurcation with rotational atherectomy and stenting with 3 drug-eluting stents using a DK crush technique. He had follow up in April with Radha and was improved.     He saw Radha in April and due to some chest pain underwent nuclear stress testing that demonstrated  the following:        There is no prior study for comparison.    The nuclear stress test is abnormal. There is nontransmural infarction in the mid to basal inferolateral segment(s) of the left ventricle associated with a mild degree of anne-infarct ischemia. The left ventricular ejection fraction at stress is 61%. Left ventricular function is normal.     Given mild anne-infarct ischemia imdur was initiated for antianginal control. He has not had chest pain since. He denies SOB. He is compliant with medications. He remains on lifelong DAPT due to above. BP today is under excellent control.            Assessment/Plan:     53-year-old male with a history of coronary artery disease and inferior STEMI treated with 3 drug-eluting stents to his proximal and mid RCA.  He then underwent complex intervention to the ostial and mid LAD and D1 bifurcation. Remains on DAPT and anginal symptoms have improved. He has known circumflex and OM disease which we will continue to medically manage for now. If he has recurrent angina despite adequate BP control we could consider intervention to this in the future.  He is also appropriately on high intensity statin therapy, beta blocker, and lisinopril. Imdur added last visit.     1. HLD: His LDL was 48 mg/dL recently. He is currently on Lipitor 40 mg daily. He will need a follow up lipid profile in 12 months with LFTs. No side effects.Will repeat ALT/lipids one year.     2. HTN: On imdur 60 mg daily. Increased lisinopril to 20 mg daily. Metoprolol 25 mg twice a day. BP well controlled today.     3. CAD: as above, continue DAPT with Brilinta and aspirin indefinitely.     4. Smoking cessation: counseled patient for 5 minutes on smoking  -attempting patches   -cut down from 1/2 ppd   -daily struggle we will continue to address at each visit.      Follow up one year     Charlee Salazar MD MSc  [facility]        PAST MEDICAL HISTORY  Past Medical History:   Diagnosis Date    CAD  (coronary artery disease)     STEMI (ST elevation myocardial infarction) (H)     Third degree heart block (H)     Tobacco abuse 3/31/2020       CURRENT MEDICATIONS  Current Outpatient Medications   Medication Sig Dispense Refill    ASPIRIN ADULT LOW STRENGTH 81 MG EC tablet TAKE 1 TABLET (81 MG) BY MOUTH DAILY 100 tablet 2    atorvastatin (LIPITOR) 40 MG tablet Take 1 tablet (40 mg) by mouth daily 90 tablet 3    cetirizine (ZYRTEC) 10 MG tablet Take 1 tablet (10 mg) by mouth daily Appointment needed to establish care with new primary provider 30 tablet 0    isosorbide mononitrate (IMDUR) 60 MG 24 hr tablet Take 1 tablet (60 mg) by mouth daily 90 tablet 3    lisinopril (ZESTRIL) 20 MG tablet TAKE 1 TABLET (20 MG) BY MOUTH DAILY 90 tablet 2    metoprolol tartrate (LOPRESSOR) 25 MG tablet Take 1 tablet (25 mg) by mouth 2 times daily 180 tablet 3    multivitamin, therapeutic (THERA-VIT) TABS tablet Take 1 tablet by mouth Takes when remembers, usually once weekly.      nitroGLYcerin (NITROSTAT) 0.4 MG sublingual tablet For chest pain place 1 tablet under the tongue every 5 minutes for 3 doses. If symptoms persist 5 minutes after 2 nd dose call 911. 30 tablet 3    ticagrelor (BRILINTA) 60 MG tablet Take 1 tablet (60 mg) by mouth 2 times daily 180 tablet 3       PAST SURGICAL HISTORY:  Past Surgical History:   Procedure Laterality Date    CV CORONARY ANGIOGRAM N/A 3/5/2020    Procedure: Coronary Angiogram;  Surgeon: Fabricio Mata MD;  Location: [facility] CARDIAC CATH LAB    CV CORONARY ANGIOGRAM N/A 4/9/2020    Procedure: Coronary Angiogram;  Surgeon: Evens Figueredo MD;  Location:  HEART CARDIAC CATH LAB    CV HEART CATHETERIZATION WITH POSSIBLE INTERVENTION N/A 3/5/2020    Procedure: Coronary Angiogram;  Surgeon: Fabricio Mata MD;  Location:  HEART CARDIAC CATH LAB    CV HEART CATHETERIZATION WITH POSSIBLE INTERVENTION N/A 3/5/2020    Procedure: Heart Catheterization with Possible  Intervention;  Surgeon: Fabricio Mata MD;  Location:  HEART CARDIAC CATH LAB    CV INTRAVASULAR ULTRASOUND N/A 4/9/2020    Procedure: Intravascular Ultrasound;  Surgeon: Evens Figueredo MD;  Location:  HEART CARDIAC CATH LAB    CV LEFT HEART CATH N/A 4/9/2020    Procedure: Left Heart Cath;  Surgeon: Evens Figueredo MD;  Location:  HEART CARDIAC CATH LAB    CV PCI ATHERECTOMY ORBITAL N/A 4/9/2020    Procedure: Percutaneous Coronary Intervention Atherectomy Rotational;  Surgeon: Evens Figueredo MD;  Location:  HEART CARDIAC CATH LAB    CV PCI STENT DRUG ELUTING N/A 3/5/2020    Procedure: Percutaneous Coronary Intervention Stent Drug Eluting;  Surgeon: Fabricio Mata MD;  Location:  HEART CARDIAC CATH LAB    CV PCI STENT DRUG ELUTING N/A 4/9/2020    Procedure: Percutaneous Coronary Intervention Stent Drug Eluting;  Surgeon: Evens Figueredo MD;  Location:  HEART CARDIAC CATH LAB    CV TEMPORARY PACEMAKER INSERTION N/A 3/5/2020    Procedure: Temporary Pacemaker Insertion;  Surgeon: Fabricio aMta MD;  Location:  HEART CARDIAC CATH LAB    CV TEMPORARY PACEMAKER INSERTION N/A 3/5/2020    Procedure: Temporary Pacemaker Insertion;  Surgeon: Fabricio Mata MD;  Location:  HEART CARDIAC CATH LAB       ALLERGIES   No Known Allergies    FAMILY HISTORY  Family History   Problem Relation Age of Onset    Prostate Cancer Father     Coronary Artery Disease Brother     Colon Cancer Brother        SOCIAL HISTORY  Social History     Socioeconomic History    Marital status: Single     Spouse name: Not on file    Number of children: Not on file    Years of education: Not on file    Highest education level: Not on file   Occupational History    Not on file   Tobacco Use    Smoking status: Every Day     Packs/day: 1     Types: Cigarettes    Smokeless tobacco: Never   Substance and Sexual Activity    Alcohol use: Not Currently    Drug use: Not Currently     "Sexual activity: Not Currently     Partners: Female   Other Topics Concern    Parent/sibling w/ CABG, MI or angioplasty before 65F 55M? Not Asked   Social History Narrative    Not on file     Social Determinants of Health     Financial Resource Strain: Not on file   Food Insecurity: Not on file   Transportation Needs: Not on file   Physical Activity: Not on file   Stress: Not on file   Social Connections: Not on file   Interpersonal Safety: Not on file   Housing Stability: Not on file       ROS:   Constitutional: No fever, chills, or sweats. No weight gain/loss   ENT: No visual disturbance, ear ache, epistaxis, sore throat  Allergies/Immunologic: Negative  Respiratory: No cough, hemoptysia  Cardiovascular: As per HPI  GI: No nausea, vomiting, hematemesis, melena, or hematochezia  : No urinary frequency, dysuria, or hematuria  Integument: Negative  Psychiatric: Negative  Neuro: Negative  Endocrinology: Negative   Musculoskeletal: Negative  Vascular: No walking impairment, claudication, ischemic rest pain or nonhealing wounds    EXAM:  /88   Pulse 59   Ht 1.702 m (5' 7\")   Wt 78.5 kg (173 lb)   SpO2 98%   BMI 27.10 kg/m    In general, the patient is a pleasant male in no apparent distress.    HEENT: NC/AT.  PERRLA.  EOMI.  Sclerae white, not injected.  Nares clear.  Pharynx without erythema or exudate.  Dentition intact.    Neck: No adenopathy.  No thyromegaly. Carotids +2/2 bilaterally without bruits.  No jugular venous distension.   Heart: RRR. Normal S1, S2 splits physiologically. No murmur, rub, click, or gallop. The PMI is in the 5th ICS in the midclavicular line. There is no heave.    Lungs: CTA.  No ronchi, wheezes, rales.  No dullness to percussion.   Abdomen: Soft, nontender, nondistended. No organomegaly. No AAA.  No bruits.   Extremities: No clubbing, cyanosis, or edema.  No wounds. No varicose veins signs of chronic venous insufficiency.   Vascular: No bruits are noted.    Labs:  LIPID " RESULTS:  Lab Results   Component Value Date    CHOL 99 01/05/2024    CHOL 92 11/30/2020    HDL 35 (L) 01/05/2024    HDL 31 (L) 11/30/2020    LDL 48 01/05/2024    LDL 45 11/30/2020    TRIG 78 01/05/2024    TRIG 78 11/30/2020    NHDL 64 01/05/2024    NHDL 61 11/30/2020       LIVER ENZYME RESULTS:  Lab Results   Component Value Date    AST 21 12/19/2022    AST 20 11/16/2020    ALT 54 12/19/2022    ALT 51 11/30/2020       CBC RESULTS:  Lab Results   Component Value Date    WBC 11.1 (H) 12/01/2021    WBC 8.7 04/09/2020    RBC 5.79 12/01/2021    RBC 5.17 04/09/2020    HGB 15.9 12/01/2021    HGB 14.3 04/09/2020    HCT 48.0 12/01/2021    HCT 43.7 04/09/2020    MCV 83 12/01/2021    MCV 85 04/09/2020    MCH 27.5 12/01/2021    MCH 27.7 04/09/2020    MCHC 33.1 12/01/2021    MCHC 32.7 04/09/2020    RDW 13.4 12/01/2021    RDW 13.2 04/09/2020     12/01/2021     04/09/2020       BMP RESULTS:  Lab Results   Component Value Date     12/01/2021     11/16/2020    POTASSIUM 4.8 12/01/2021    POTASSIUM 3.9 11/16/2020    CHLORIDE 108 12/01/2021    CHLORIDE 107 11/16/2020    CO2 30 12/01/2021    CO2 26 11/16/2020    ANIONGAP <1 (L) 12/01/2021    ANIONGAP 5 11/16/2020     (H) 12/01/2021    GLC 88 11/16/2020    BUN 20 12/01/2021    BUN 17 11/16/2020    CR 1.02 12/01/2021    CR 0.96 11/16/2020    GFRESTIMATED 86 12/01/2021    GFRESTIMATED >90 11/16/2020    GFRESTBLACK >90 11/16/2020    RINA 8.8 12/01/2021    RINA 8.9 11/16/2020        A1C RESULTS:  Lab Results   Component Value Date    A1C 5.7 (H) 03/06/2020   Thank you for allowing me to participate in the care of your patient.      Sincerely,     Charlee Salazar MD     [facility]  cc:   Radha Gallardo PA-C  [address]  [place] [id number]

## 2024-01-09 NOTE — PROGRESS NOTES
HPI:     This is a pleasant 53-year-old man from [place] with a past medical history significant for hypertension, tobacco abuse, and family history of coronary artery disease.     From prior records: On 3/5/2020 he presented to the ED with an acute ST elevation MI and third-degree heart block.  He was taken emergently to the Cath Lab where a temporary pacemaker was placed.  He angiogram showed a 100% RCA occlusion which was treated with 3 drug-eluting stents to the proximal and mid RCA.  There was a residual 70% stenosis in the proximal to mid LAD and 60% distal circumflex and a 60% OM 3 lesion. He continued to have recurrent chest pain with inferior changes noted on his EKG post procedure and was brought back to the Cath Lab where he was found to have stent thrombosis of the mid RCA stent.  He underwent high-pressure balloon angioplasty with restoration of LETTY-3 flow.  His pacemaker wire was removed the next day.  He had a follow-up echo which showed overall preserved LV function but possible severe hypokinesis of the basal inferior wall.  RV function was normal.  He was discharged on Brilinta, aspirin, metoprolol, lisinopril, and atorvastatin.      He had a follow-up visit with LUIS Kaur on 3/31/2020.  He was experiencing chest discomfort with exertion that resolved with rest and ultimately it was recommended he undergo a staged intervention to the his residual proximal LAD.  On 4/9 he underwent PCI of the ostial and mid LAD and D1 bifurcation with rotational atherectomy and stenting with 3 drug-eluting stents using a DK crush technique. He had follow up in April with Radha and was improved.     He saw Radha in April and due to some chest pain underwent nuclear stress testing that demonstrated the following:        There is no prior study for comparison.    The nuclear stress test is abnormal. There is nontransmural infarction in the mid to basal inferolateral segment(s) of the left  ventricle associated with a mild degree of anne-infarct ischemia. The left ventricular ejection fraction at stress is 61%. Left ventricular function is normal.     Given mild anne-infarct ischemia imdur was initiated for antianginal control. He has not had chest pain since. He denies SOB. He is compliant with medications. He remains on lifelong DAPT due to above. BP today is under excellent control.            Assessment/Plan:     53-year-old male with a history of coronary artery disease and inferior STEMI treated with 3 drug-eluting stents to his proximal and mid RCA.  He then underwent complex intervention to the ostial and mid LAD and D1 bifurcation. Remains on DAPT and anginal symptoms have improved. He has known circumflex and OM disease which we will continue to medically manage for now. If he has recurrent angina despite adequate BP control we could consider intervention to this in the future.  He is also appropriately on high intensity statin therapy, beta blocker, and lisinopril. Imdur added last visit.     1. HLD: His LDL was 48 mg/dL recently. He is currently on Lipitor 40 mg daily. He will need a follow up lipid profile in 12 months with LFTs. No side effects.Will repeat ALT/lipids one year.     2. HTN: On imdur 60 mg daily. Increased lisinopril to 20 mg daily. Metoprolol 25 mg twice a day. BP well controlled today.     3. CAD: as above, continue DAPT with Brilinta and aspirin indefinitely.     4. Smoking cessation: counseled patient for 5 minutes on smoking  -attempting patches   -cut down from 1/2 ppd   -daily struggle we will continue to address at each visit.      Follow up one year     Charlee Salazar MD MSc  [facility]        PAST MEDICAL HISTORY  Past Medical History:   Diagnosis Date    CAD (coronary artery disease)     STEMI (ST elevation myocardial infarction) (H)     Third degree heart block (H)     Tobacco abuse 3/31/2020       CURRENT MEDICATIONS  Current Outpatient Medications    Medication Sig Dispense Refill    ASPIRIN ADULT LOW STRENGTH 81 MG EC tablet TAKE 1 TABLET (81 MG) BY MOUTH DAILY 100 tablet 2    atorvastatin (LIPITOR) 40 MG tablet Take 1 tablet (40 mg) by mouth daily 90 tablet 3    cetirizine (ZYRTEC) 10 MG tablet Take 1 tablet (10 mg) by mouth daily Appointment needed to establish care with new primary provider 30 tablet 0    isosorbide mononitrate (IMDUR) 60 MG 24 hr tablet Take 1 tablet (60 mg) by mouth daily 90 tablet 3    lisinopril (ZESTRIL) 20 MG tablet TAKE 1 TABLET (20 MG) BY MOUTH DAILY 90 tablet 2    metoprolol tartrate (LOPRESSOR) 25 MG tablet Take 1 tablet (25 mg) by mouth 2 times daily 180 tablet 3    multivitamin, therapeutic (THERA-VIT) TABS tablet Take 1 tablet by mouth Takes when remembers, usually once weekly.      nitroGLYcerin (NITROSTAT) 0.4 MG sublingual tablet For chest pain place 1 tablet under the tongue every 5 minutes for 3 doses. If symptoms persist 5 minutes after 2 nd dose call 911. 30 tablet 3    ticagrelor (BRILINTA) 60 MG tablet Take 1 tablet (60 mg) by mouth 2 times daily 180 tablet 3       PAST SURGICAL HISTORY:  Past Surgical History:   Procedure Laterality Date    CV CORONARY ANGIOGRAM N/A 3/5/2020    Procedure: Coronary Angiogram;  Surgeon: Fabricio Mata MD;  Location: [facility] CARDIAC CATH LAB    CV CORONARY ANGIOGRAM N/A 4/9/2020    Procedure: Coronary Angiogram;  Surgeon: Evens Figueredo MD;  Location:  HEART CARDIAC CATH LAB    CV HEART CATHETERIZATION WITH POSSIBLE INTERVENTION N/A 3/5/2020    Procedure: Coronary Angiogram;  Surgeon: Fabricio Mata MD;  Location: [facility] CARDIAC CATH LAB    CV HEART CATHETERIZATION WITH POSSIBLE INTERVENTION N/A 3/5/2020    Procedure: Heart Catheterization with Possible Intervention;  Surgeon: Fabricio Mata MD;  Location: [facility] CARDIAC CATH LAB    CV INTRAVASULAR ULTRASOUND N/A 4/9/2020    Procedure: Intravascular Ultrasound;  Surgeon: Evens Figueredo  MD;  Location:  HEART CARDIAC CATH LAB    CV LEFT HEART CATH N/A 4/9/2020    Procedure: Left Heart Cath;  Surgeon: Evens Figueredo MD;  Location:  HEART CARDIAC CATH LAB    CV PCI ATHERECTOMY ORBITAL N/A 4/9/2020    Procedure: Percutaneous Coronary Intervention Atherectomy Rotational;  Surgeon: Evens Figueredo MD;  Location:  HEART CARDIAC CATH LAB    CV PCI STENT DRUG ELUTING N/A 3/5/2020    Procedure: Percutaneous Coronary Intervention Stent Drug Eluting;  Surgeon: Fabricio Mata MD;  Location:  HEART CARDIAC CATH LAB    CV PCI STENT DRUG ELUTING N/A 4/9/2020    Procedure: Percutaneous Coronary Intervention Stent Drug Eluting;  Surgeon: Evens Figueredo MD;  Location:  HEART CARDIAC CATH LAB    CV TEMPORARY PACEMAKER INSERTION N/A 3/5/2020    Procedure: Temporary Pacemaker Insertion;  Surgeon: Fabricio Mata MD;  Location:  HEART CARDIAC CATH LAB    CV TEMPORARY PACEMAKER INSERTION N/A 3/5/2020    Procedure: Temporary Pacemaker Insertion;  Surgeon: Fabricio Mata MD;  Location:  HEART CARDIAC CATH LAB       ALLERGIES   No Known Allergies    FAMILY HISTORY  Family History   Problem Relation Age of Onset    Prostate Cancer Father     Coronary Artery Disease Brother     Colon Cancer Brother        SOCIAL HISTORY  Social History     Socioeconomic History    Marital status: Single     Spouse name: Not on file    Number of children: Not on file    Years of education: Not on file    Highest education level: Not on file   Occupational History    Not on file   Tobacco Use    Smoking status: Every Day     Packs/day: 1     Types: Cigarettes    Smokeless tobacco: Never   Substance and Sexual Activity    Alcohol use: Not Currently    Drug use: Not Currently    Sexual activity: Not Currently     Partners: Female   Other Topics Concern    Parent/sibling w/ CABG, MI or angioplasty before 65F 55M? Not Asked   Social History Narrative    Not on file     Social  "Determinants of Health     Financial Resource Strain: Not on file   Food Insecurity: Not on file   Transportation Needs: Not on file   Physical Activity: Not on file   Stress: Not on file   Social Connections: Not on file   Interpersonal Safety: Not on file   Housing Stability: Not on file       ROS:   Constitutional: No fever, chills, or sweats. No weight gain/loss   ENT: No visual disturbance, ear ache, epistaxis, sore throat  Allergies/Immunologic: Negative  Respiratory: No cough, hemoptysia  Cardiovascular: As per HPI  GI: No nausea, vomiting, hematemesis, melena, or hematochezia  : No urinary frequency, dysuria, or hematuria  Integument: Negative  Psychiatric: Negative  Neuro: Negative  Endocrinology: Negative   Musculoskeletal: Negative  Vascular: No walking impairment, claudication, ischemic rest pain or nonhealing wounds    EXAM:  /88   Pulse 59   Ht 1.702 m (5' 7\")   Wt 78.5 kg (173 lb)   SpO2 98%   BMI 27.10 kg/m    In general, the patient is a pleasant male in no apparent distress.    HEENT: NC/AT.  PERRLA.  EOMI.  Sclerae white, not injected.  Nares clear.  Pharynx without erythema or exudate.  Dentition intact.    Neck: No adenopathy.  No thyromegaly. Carotids +2/2 bilaterally without bruits.  No jugular venous distension.   Heart: RRR. Normal S1, S2 splits physiologically. No murmur, rub, click, or gallop. The PMI is in the 5th ICS in the midclavicular line. There is no heave.    Lungs: CTA.  No ronchi, wheezes, rales.  No dullness to percussion.   Abdomen: Soft, nontender, nondistended. No organomegaly. No AAA.  No bruits.   Extremities: No clubbing, cyanosis, or edema.  No wounds. No varicose veins signs of chronic venous insufficiency.   Vascular: No bruits are noted.    Labs:  LIPID RESULTS:  Lab Results   Component Value Date    CHOL 99 01/05/2024    CHOL 92 11/30/2020    HDL 35 (L) 01/05/2024    HDL 31 (L) 11/30/2020    LDL 48 01/05/2024    LDL 45 11/30/2020    TRIG 78 01/05/2024    " TRIG 78 11/30/2020    NHDL 64 01/05/2024    NHDL 61 11/30/2020       LIVER ENZYME RESULTS:  Lab Results   Component Value Date    AST 21 12/19/2022    AST 20 11/16/2020    ALT 54 12/19/2022    ALT 51 11/30/2020       CBC RESULTS:  Lab Results   Component Value Date    WBC 11.1 (H) 12/01/2021    WBC 8.7 04/09/2020    RBC 5.79 12/01/2021    RBC 5.17 04/09/2020    HGB 15.9 12/01/2021    HGB 14.3 04/09/2020    HCT 48.0 12/01/2021    HCT 43.7 04/09/2020    MCV 83 12/01/2021    MCV 85 04/09/2020    MCH 27.5 12/01/2021    MCH 27.7 04/09/2020    MCHC 33.1 12/01/2021    MCHC 32.7 04/09/2020    RDW 13.4 12/01/2021    RDW 13.2 04/09/2020     12/01/2021     04/09/2020       BMP RESULTS:  Lab Results   Component Value Date     12/01/2021     11/16/2020    POTASSIUM 4.8 12/01/2021    POTASSIUM 3.9 11/16/2020    CHLORIDE 108 12/01/2021    CHLORIDE 107 11/16/2020    CO2 30 12/01/2021    CO2 26 11/16/2020    ANIONGAP <1 (L) 12/01/2021    ANIONGAP 5 11/16/2020     (H) 12/01/2021    GLC 88 11/16/2020    BUN 20 12/01/2021    BUN 17 11/16/2020    CR 1.02 12/01/2021    CR 0.96 11/16/2020    GFRESTIMATED 86 12/01/2021    GFRESTIMATED >90 11/16/2020    GFRESTBLACK >90 11/16/2020    RINA 8.8 12/01/2021    RINA 8.9 11/16/2020        A1C RESULTS:  Lab Results   Component Value Date    A1C 5.7 (H) 03/06/2020

## 2024-01-18 ENCOUNTER — OFFICE VISIT (OUTPATIENT)
Dept: FAMILY MEDICINE | Facility: CLINIC | Age: 54
End: 2024-01-18
Payer: COMMERCIAL

## 2024-01-18 ENCOUNTER — ANCILLARY PROCEDURE (OUTPATIENT)
Dept: GENERAL RADIOLOGY | Facility: CLINIC | Age: 54
End: 2024-01-18
Attending: FAMILY MEDICINE
Payer: COMMERCIAL

## 2024-01-18 VITALS
RESPIRATION RATE: 16 BRPM | WEIGHT: 171 LBS | OXYGEN SATURATION: 98 % | HEIGHT: 67 IN | HEART RATE: 63 BPM | TEMPERATURE: 97.9 F | BODY MASS INDEX: 26.84 KG/M2 | DIASTOLIC BLOOD PRESSURE: 78 MMHG | SYSTOLIC BLOOD PRESSURE: 130 MMHG

## 2024-01-18 DIAGNOSIS — R10.30 LOWER ABDOMINAL PAIN: ICD-10-CM

## 2024-01-18 DIAGNOSIS — Z11.59 NEED FOR HEPATITIS C SCREENING TEST: ICD-10-CM

## 2024-01-18 DIAGNOSIS — K64.4 EXTERNAL HEMORRHOIDS: ICD-10-CM

## 2024-01-18 DIAGNOSIS — Z23 HIGH PRIORITY FOR 2019-NCOV VACCINE: ICD-10-CM

## 2024-01-18 DIAGNOSIS — Z12.11 SCREEN FOR COLON CANCER: Primary | ICD-10-CM

## 2024-01-18 DIAGNOSIS — Z72.0 TOBACCO ABUSE: ICD-10-CM

## 2024-01-18 DIAGNOSIS — Z11.4 SCREENING FOR HIV (HUMAN IMMUNODEFICIENCY VIRUS): ICD-10-CM

## 2024-01-18 DIAGNOSIS — I25.119 CORONARY ARTERY DISEASE INVOLVING NATIVE CORONARY ARTERY OF NATIVE HEART WITH ANGINA PECTORIS (H): ICD-10-CM

## 2024-01-18 DIAGNOSIS — K62.5 RECTAL BLEEDING: ICD-10-CM

## 2024-01-18 LAB
ERYTHROCYTE [DISTWIDTH] IN BLOOD BY AUTOMATED COUNT: 12.8 % (ref 10–15)
HCT VFR BLD AUTO: 48.7 % (ref 40–53)
HGB BLD-MCNC: 16 G/DL (ref 13.3–17.7)
MCH RBC QN AUTO: 27.7 PG (ref 26.5–33)
MCHC RBC AUTO-ENTMCNC: 32.9 G/DL (ref 31.5–36.5)
MCV RBC AUTO: 84 FL (ref 78–100)
PLATELET # BLD AUTO: 289 10E3/UL (ref 150–450)
RBC # BLD AUTO: 5.77 10E6/UL (ref 4.4–5.9)
WBC # BLD AUTO: 7.1 10E3/UL (ref 4–11)

## 2024-01-18 PROCEDURE — 90471 IMMUNIZATION ADMIN: CPT | Performed by: FAMILY MEDICINE

## 2024-01-18 PROCEDURE — 99214 OFFICE O/P EST MOD 30 MIN: CPT | Mod: 25 | Performed by: FAMILY MEDICINE

## 2024-01-18 PROCEDURE — 90677 PCV20 VACCINE IM: CPT | Performed by: FAMILY MEDICINE

## 2024-01-18 PROCEDURE — 74019 RADEX ABDOMEN 2 VIEWS: CPT | Mod: TC | Performed by: RADIOLOGY

## 2024-01-18 PROCEDURE — 90472 IMMUNIZATION ADMIN EACH ADD: CPT | Performed by: FAMILY MEDICINE

## 2024-01-18 PROCEDURE — 87389 HIV-1 AG W/HIV-1&-2 AB AG IA: CPT | Performed by: FAMILY MEDICINE

## 2024-01-18 PROCEDURE — 90715 TDAP VACCINE 7 YRS/> IM: CPT | Performed by: FAMILY MEDICINE

## 2024-01-18 PROCEDURE — 91320 SARSCV2 VAC 30MCG TRS-SUC IM: CPT | Performed by: FAMILY MEDICINE

## 2024-01-18 PROCEDURE — 86803 HEPATITIS C AB TEST: CPT | Performed by: FAMILY MEDICINE

## 2024-01-18 PROCEDURE — 36415 COLL VENOUS BLD VENIPUNCTURE: CPT | Performed by: FAMILY MEDICINE

## 2024-01-18 PROCEDURE — 90480 ADMN SARSCOV2 VAC 1/ONLY CMP: CPT | Performed by: FAMILY MEDICINE

## 2024-01-18 PROCEDURE — 85027 COMPLETE CBC AUTOMATED: CPT | Performed by: FAMILY MEDICINE

## 2024-01-18 PROCEDURE — 80053 COMPREHEN METABOLIC PANEL: CPT | Performed by: FAMILY MEDICINE

## 2024-01-18 RX ORDER — HYDROCORTISONE 25 MG/G
CREAM TOPICAL 2 TIMES DAILY PRN
Qty: 30 G | Refills: 1 | Status: SHIPPED | OUTPATIENT
Start: 2024-01-18 | End: 2024-10-01

## 2024-01-18 NOTE — PROGRESS NOTES
"  Assessment & Plan     Screen for colon cancer  For colonoscopy which would be appropriate to further evaluate and see if any issues.  - Colonoscopy Screening  Referral; Future    Screening for HIV (human immunodeficiency virus)    - HIV Antigen Antibody Combo; Future    Need for hepatitis C screening test    - Hepatitis C Screen Reflex to HCV RNA Quant and Genotype; Future    High priority for 2019-nCoV vaccine      Lower abdominal pain  xray reviewed does indicate increased stool in the abdomen most likely constipation which may be contributing to his symptoms.  - XR Abdomen 2 Views; Future    Coronary artery disease involving native coronary artery of native heart with angina pectoris (H24)  With cardiology.    Tobacco abuse      Rectal bleeding  Most likely lower rectal and hemorrhoid.  Suggested some diabetes and if is not better he will follow-up.  - CBC with platelets; Future  - Comprehensive metabolic panel (BMP + Alb, Alk Phos, ALT, AST, Total. Bili, TP); Future    External hemorrhoids  Patient most likely has slight external hemorrhoid.  Suggested to increase fiber supplement also avoid constipation use MiraLAX for the next 3 to 4 days to improve stools and then start taking Benefiber or Metamucil daily.  Use the steroid cream to see if that helps.  If symptoms does not improve he needs to notify us.  - hydrocortisone, Perianal, (HYDROCORTISONE) 2.5 % cream; Place rectally 2 times daily as needed for hemorrhoids        Nicotine/Tobacco Cessation  He reports that he has been smoking cigarettes. He has been smoking an average of 1 pack per day. He has never used smokeless tobacco.  Nicotine/Tobacco Cessation Plan  Information offered: Patient not interested at this time      BMI  Estimated body mass index is 26.78 kg/m  as calculated from the following:    Height as of this encounter: 1.702 m (5' 7\").    Weight as of this encounter: 77.6 kg (171 lb).     Subjective   Candelario is a 53 year old, " "presenting for the following health issues:  Abdominal Pain and Rectal Problem (Anal bleeding )        1/18/2024     8:38 AM   Additional Questions   Roomed by Catia QUISPE CMA     History of Present Illness       Reason for visit:  Hemoroids    He eats 0-1 servings of fruits and vegetables daily.He consumes 2 sweetened beverage(s) daily.He exercises with enough effort to increase his heart rate 9 or less minutes per day.  He exercises with enough effort to increase his heart rate 3 or less days per week.   He is taking medications regularly.  Patient came today with complains of some discomfort in the anal area.  He also feels like his lower abdomen sometimes cause the discomfort.  There is no nausea or vomiting.  He has history of coronary artery disease s/p 3 stents.  He continues smoke.  From diabetes trainer, feels constipated he feels there is some discomfort in the anal area.  Lately he has some blood in the stools.  For the last 1 week there is no blood or if he is straining and feel constipated sometimes he feels blood in the stool.    Hemorrhoids  Onset/Duration: months ago  Description:   Altagracia-anal lump: YES  Pain: YES  Itching: No  Accompanying Signs & Symptoms:  Blood in stool: YES  Changes in stool pattern: YES- feels like needs to use the bathroom all the time. Burning sensation very painful   History:   Any previous GI studies done:none  Family History of colon cancer: YES- brother  Precipitating factors:   Sitting and using the restroom-feels a stabbing pain   Alleviating factors:  Uses miralax twice a week. Helps loosen stool.   Therapies tried and outcome: stool softeners   ]=        Objective    /78   Pulse 63   Temp 97.9  F (36.6  C) (Tympanic)   Resp 16   Ht 1.702 m (5' 7\")   Wt 77.6 kg (171 lb)   SpO2 98%   BMI 26.78 kg/m    Body mass index is 26.78 kg/m .    Review of Systems  Constitutional, HEENT, cardiovascular, pulmonary, gi and gu systems are negative, except as otherwise " noted.  Physical Exam   GENERAL: alert and no distress  NECK: no adenopathy, no asymmetry, masses, or scars  RESP: lungs clear to auscultation - no rales, rhonchi or wheezes  CV: regular rate and rhythm, normal S1 S2, no S3 or S4, no murmur, click or rub, no peripheral edema  ABDOMEN: soft, nontender, no hepatosplenomegaly, no masses and bowel sounds normal no pain with deep palpation  Rectal exam does not indicate any blood in the rectal vault  External hemorrhoids noted.  Rectal tone is normal.  MS: no gross musculoskeletal defects noted, no edema            Signed Electronically by: Chemo García MD

## 2024-01-19 LAB
ALBUMIN SERPL BCG-MCNC: 4.4 G/DL (ref 3.5–5.2)
ALP SERPL-CCNC: 73 U/L (ref 40–150)
ALT SERPL W P-5'-P-CCNC: 37 U/L (ref 0–70)
ANION GAP SERPL CALCULATED.3IONS-SCNC: 9 MMOL/L (ref 7–15)
AST SERPL W P-5'-P-CCNC: 19 U/L (ref 0–45)
BILIRUB SERPL-MCNC: 0.7 MG/DL
BUN SERPL-MCNC: 17.3 MG/DL (ref 6–20)
CALCIUM SERPL-MCNC: 9.4 MG/DL (ref 8.6–10)
CHLORIDE SERPL-SCNC: 107 MMOL/L (ref 98–107)
CREAT SERPL-MCNC: 0.89 MG/DL (ref 0.67–1.17)
DEPRECATED HCO3 PLAS-SCNC: 23 MMOL/L (ref 22–29)
EGFRCR SERPLBLD CKD-EPI 2021: >90 ML/MIN/1.73M2
GLUCOSE SERPL-MCNC: 103 MG/DL (ref 70–99)
HCV AB SERPL QL IA: NONREACTIVE
HIV 1+2 AB+HIV1 P24 AG SERPL QL IA: NONREACTIVE
POTASSIUM SERPL-SCNC: 4.7 MMOL/L (ref 3.4–5.3)
PROT SERPL-MCNC: 6.6 G/DL (ref 6.4–8.3)
SODIUM SERPL-SCNC: 139 MMOL/L (ref 135–145)

## 2024-05-08 ENCOUNTER — VIRTUAL VISIT (OUTPATIENT)
Dept: FAMILY MEDICINE | Facility: CLINIC | Age: 54
End: 2024-05-08
Payer: COMMERCIAL

## 2024-05-08 DIAGNOSIS — I25.10 CORONARY ARTERY DISEASE INVOLVING NATIVE CORONARY ARTERY OF NATIVE HEART WITHOUT ANGINA PECTORIS: ICD-10-CM

## 2024-05-08 DIAGNOSIS — I10 BENIGN ESSENTIAL HYPERTENSION: Primary | ICD-10-CM

## 2024-05-08 PROCEDURE — 99214 OFFICE O/P EST MOD 30 MIN: CPT | Mod: 95 | Performed by: FAMILY MEDICINE

## 2024-05-08 PROCEDURE — G2211 COMPLEX E/M VISIT ADD ON: HCPCS | Mod: 95 | Performed by: FAMILY MEDICINE

## 2024-05-08 RX ORDER — LISINOPRIL 40 MG/1
40 TABLET ORAL DAILY
Qty: 90 TABLET | Refills: 1 | Status: SHIPPED | OUTPATIENT
Start: 2024-05-08

## 2024-05-08 NOTE — PROGRESS NOTES
"Candelario is a 53 year old who is being evaluated via a billable video visit.    How would you like to obtain your AVS? Danyellehart  If the video visit is dropped, the invitation should be resent by: Text to cell phone: [phone]  Will anyone else be joining your video visit? No      Assessment & Plan     Coronary artery disease involving native coronary artery of native heart without angina pectoris    - lisinopril (ZESTRIL) 40 MG tablet; Take 1 tablet (40 mg) by mouth daily    Benign essential hypertension  Patient blood pressure readings are not optimally controlled.  I suggested to increase the lisinopril to 40 mg.  He can take 2 tablets of 20 mg daily till he is done and the new prescription will be 40 mg.  He will check his blood pressure once a day when he is more relaxed and send those readings in 1 week.  If they are still elevated we will have him see us in the clinic for further evaluation.  At that time we can either suggest further evaluation or we can add hydrochlorothiazide to his regimen to see if that helps.  Warning signs were discussed with the patient any headache which is worsening ,any chest pain or any neurological symptoms, please notify us immediatly.  - lisinopril (ZESTRIL) 40 MG tablet; Take 1 tablet 40 mg) by mouth daily    The longitudinal plan of care for the diagnosis(es)/condition(s) as documented were addressed during this visit. Due to the added complexity in care, I will continue to support Candelario in the subsequent management and with ongoing continuity of care.      BMI  Estimated body mass index is 26.78 kg/m  as calculated from the following:    Height as of 1/18/24: 1.702 m (5' 7\").    Weight as of 1/18/24: 77.6 kg (171 lb).       Subjective   Candelario is a 53 year old, presenting for the following health issues:  Hypertension        5/8/2024     4:10 PM   Additional Questions   Roomed by Dillan LEARY     Hypertension Follow-up  Patient blood pressure has been elevated however few " readings are very high but in average he thinks his blood pressure is around 160 systolic versus above 90 diastolic.  Patient is somewhat better if he takes 2 tablets of lisinopril along with other medication including Imdur as well as metoprolol.  Denies any chest pains no shortness of breath.  BP readings have been elevated over the past couple of weeks. Does sometimes increase the Lisinopril to two tabs daily when readings are high.      Do you check your blood pressure regularly outside of the clinic? Yes   Are you following a low salt diet? No  Are your blood pressures ever more than 140 on the top number (systolic) OR more   than 90 on the bottom number (diastolic), for example 140/90? Yes    How many days per week do you exercise enough to make your heart beat faster? 3 or less  How many minutes a day do you exercise enough to make your heart beat faster? 9 or less  How many days per week do you miss taking your medication? 0        Review of Systems  Constitutional, HEENT, cardiovascular, pulmonary, gi and gu systems are negative, except as otherwise noted.      Objective    Vitals - Patient Reported  Systolic (Patient Reported): (!) 156  Diastolic (Patient Reported): 83        Physical Exam   GENERAL: alert and no distress  EYES: Eyes grossly normal to inspection.  No discharge or erythema, or obvious scleral/conjunctival abnormalities.  RESP: No audible wheeze, cough, or visible cyanosis.    SKIN: Visible skin clear. No significant rash, abnormal pigmentation or lesions.  NEURO: Cranial nerves grossly intact.  Mentation and speech appropriate for age.  PSYCH: Appropriate affect, tone, and pace of words      Video-Visit Details    Type of service:  Video Visit   Originating Location (pt. Location): Home    Distant Location (provider location):  On-site  Platform used for Video Visit: Maya  Signed Electronically by: Chemo García MD

## 2024-05-27 NOTE — TELEPHONE ENCOUNTER
[facility] Refill Guideline reviewed.  Medication meets criteria for refill.    Alert and oriented to person, place and time

## 2024-06-16 ENCOUNTER — HEALTH MAINTENANCE LETTER (OUTPATIENT)
Age: 54
End: 2024-06-16

## 2024-07-05 DIAGNOSIS — I25.10 CORONARY ARTERY DISEASE INVOLVING NATIVE CORONARY ARTERY OF NATIVE HEART WITHOUT ANGINA PECTORIS: ICD-10-CM

## 2024-07-05 RX ORDER — ISOSORBIDE MONONITRATE 60 MG/1
60 TABLET, EXTENDED RELEASE ORAL DAILY
Qty: 90 TABLET | Refills: 2 | Status: SHIPPED | OUTPATIENT
Start: 2024-07-05

## 2024-09-04 ENCOUNTER — TELEPHONE (OUTPATIENT)
Dept: GASTROENTEROLOGY | Facility: CLINIC | Age: 54
End: 2024-09-04
Payer: COMMERCIAL

## 2024-09-04 NOTE — TELEPHONE ENCOUNTER
"Endoscopy Scheduling Screen    Have you had a positive Covid test in the last 14 days?  No    What is your communication preference for Instructions and/or Bowel Prep?   MyChart    What insurance is in the chart?  Other:  [employer]    Ordering/Referring Provider:     SHARITA FONTAINE      (If ordering provider performs procedure, schedule with ordering provider unless otherwise instructed. )    BMI: Estimated body mass index is 26.78 kg/m  as calculated from the following:    Height as of 1/18/24: 1.702 m (5' 7\").    Weight as of 1/18/24: 77.6 kg (171 lb).     Sedation Ordered  moderate sedation.   If patient BMI > 50 do not schedule in [facility].    If patient BMI > 45 do not schedule at [facility].    Are you taking methadone or Suboxone?  No    Have you had difficulties, pain, or discomfort during past endoscopy procedures?  No    Are you taking any prescription medications for pain 3 or more times per week?   NO, No RN review required.    Do you have a history of malignant hyperthermia?  No    (Females) Are you currently pregnant?        Have you been diagnosed or told you have pulmonary hypertension?   No    Do you have an LVAD?  No    Have you been told you have moderate to severe sleep apnea?  No    Have you been told you have COPD, asthma, or any other lung disease?  No    Do you have any heart conditions?  Yes     In the past year, have you had any hospitalizations for heart related issues including cardiomyopathy, heart attack, or stent placement?  No    Do you have any implantable devices in your body (pacemaker, ICD)?  No    Do you take nitroglycerine?  No    Have you ever had or are you waiting for an organ transplant?  No. Continue scheduling, no site restrictions.    Have you had a stroke or transient ischemic attack (TIA aka \"mini stroke\" in the last 6 months?   No    Have you been diagnosed with or been told you have cirrhosis of the liver?   No    Are you currently on dialysis?   No    Do you need assistance " "transferring?   No    BMI: Estimated body mass index is 26.78 kg/m  as calculated from the following:    Height as of 1/18/24: 1.702 m (5' 7\").    Weight as of 1/18/24: 77.6 kg (171 lb).     Is patients BMI > 40 and scheduling location [facility]?  No    Do you take an injectable medication for weight loss or diabetes (excluding insulin)?  No    Do you take the medication Naltrexone?  No    Do you take blood thinners?  No       Prep   Are you currently on dialysis or do you have chronic kidney disease?  No    Do you have a diagnosis of diabetes?  No    Do you have a diagnosis of cystic fibrosis (CF)?  No    On a regular basis do you go 3 -5 days between bowel movements?  No    BMI > 40?  No    Preferred Pharmacy:    [facility] - [place], [place] - [address]  [address]  [place] [address]  Phone: [phone] Fax: [phone]      Final Scheduling Details     Procedure scheduled  Colonoscopy    Surgeon:  HALLE     Date of procedure:  10/18     Pre-OP / PAC:   No - Not required for this site.    Location  [facility] - Per order.    Sedation   Moderate Sedation - Per order.      Patient Reminders:   You will receive a call from a Nurse to review instructions and health history.  This assessment must be completed prior to your procedure.  Failure to complete the Nurse assessment may result in the procedure being cancelled.      On the day of your procedure, please designate an adult(s) who can drive you home stay with you for the next 24 hours. The medicines used in the exam will make you sleepy. You will not be able to drive.      You cannot take public transportation, ride share services, or non-medical taxi service without a responsible caregiver.  Medical transport services are allowed with the requirement that a responsible caregiver will receive you at your destination.  We require that drivers and caregivers are confirmed prior to your procedure.  "

## 2024-09-27 ENCOUNTER — MYC MEDICAL ADVICE (OUTPATIENT)
Dept: GASTROENTEROLOGY | Facility: CLINIC | Age: 54
End: 2024-09-27
Payer: COMMERCIAL

## 2024-10-01 ENCOUNTER — OFFICE VISIT (OUTPATIENT)
Dept: FAMILY MEDICINE | Facility: CLINIC | Age: 54
End: 2024-10-01
Payer: COMMERCIAL

## 2024-10-01 VITALS
TEMPERATURE: 97.6 F | HEART RATE: 62 BPM | WEIGHT: 171 LBS | HEIGHT: 66 IN | BODY MASS INDEX: 27.48 KG/M2 | OXYGEN SATURATION: 100 % | DIASTOLIC BLOOD PRESSURE: 108 MMHG | SYSTOLIC BLOOD PRESSURE: 140 MMHG | RESPIRATION RATE: 14 BRPM

## 2024-10-01 DIAGNOSIS — R73.01 IFG (IMPAIRED FASTING GLUCOSE): ICD-10-CM

## 2024-10-01 DIAGNOSIS — R10.819 SUPRAPUBIC TENDERNESS: ICD-10-CM

## 2024-10-01 DIAGNOSIS — Z12.5 ENCOUNTER FOR PROSTATE CANCER SCREENING: ICD-10-CM

## 2024-10-01 DIAGNOSIS — K62.5 RECTAL BLEEDING: ICD-10-CM

## 2024-10-01 DIAGNOSIS — K59.00 CONSTIPATION, UNSPECIFIED CONSTIPATION TYPE: ICD-10-CM

## 2024-10-01 DIAGNOSIS — I25.119 CORONARY ARTERY DISEASE INVOLVING NATIVE CORONARY ARTERY OF NATIVE HEART WITH ANGINA PECTORIS (H): ICD-10-CM

## 2024-10-01 DIAGNOSIS — E78.5 DYSLIPIDEMIA: ICD-10-CM

## 2024-10-01 DIAGNOSIS — Z72.0 TOBACCO ABUSE: ICD-10-CM

## 2024-10-01 DIAGNOSIS — R31.29 MICROSCOPIC HEMATURIA: ICD-10-CM

## 2024-10-01 DIAGNOSIS — I10 ESSENTIAL HYPERTENSION: ICD-10-CM

## 2024-10-01 DIAGNOSIS — Z00.00 ROUTINE GENERAL MEDICAL EXAMINATION AT A HEALTH CARE FACILITY: Primary | ICD-10-CM

## 2024-10-01 DIAGNOSIS — Z95.5 S/P CORONARY ARTERY STENT PLACEMENT: ICD-10-CM

## 2024-10-01 DIAGNOSIS — K64.4 EXTERNAL HEMORRHOIDS: ICD-10-CM

## 2024-10-01 DIAGNOSIS — F17.200 NICOTINE DEPENDENCE, UNCOMPLICATED, UNSPECIFIED NICOTINE PRODUCT TYPE: ICD-10-CM

## 2024-10-01 PROBLEM — I25.2 HISTORY OF ST ELEVATION MYOCARDIAL INFARCTION (STEMI): Status: ACTIVE | Noted: 2020-03-05

## 2024-10-01 LAB
ALBUMIN UR-MCNC: NEGATIVE MG/DL
APPEARANCE UR: CLEAR
BACTERIA #/AREA URNS HPF: ABNORMAL /HPF
BILIRUB UR QL STRIP: NEGATIVE
COLOR UR AUTO: YELLOW
ERYTHROCYTE [DISTWIDTH] IN BLOOD BY AUTOMATED COUNT: 12.9 % (ref 10–15)
GLUCOSE UR STRIP-MCNC: NEGATIVE MG/DL
HCT VFR BLD AUTO: 49.1 % (ref 40–53)
HGB BLD-MCNC: 16.4 G/DL (ref 13.3–17.7)
HGB UR QL STRIP: ABNORMAL
KETONES UR STRIP-MCNC: NEGATIVE MG/DL
LEUKOCYTE ESTERASE UR QL STRIP: NEGATIVE
MCH RBC QN AUTO: 28.3 PG (ref 26.5–33)
MCHC RBC AUTO-ENTMCNC: 33.4 G/DL (ref 31.5–36.5)
MCV RBC AUTO: 85 FL (ref 78–100)
NITRATE UR QL: NEGATIVE
PH UR STRIP: 6 [PH] (ref 5–7)
PLATELET # BLD AUTO: 279 10E3/UL (ref 150–450)
RBC # BLD AUTO: 5.79 10E6/UL (ref 4.4–5.9)
RBC #/AREA URNS AUTO: ABNORMAL /HPF
SP GR UR STRIP: 1.02 (ref 1–1.03)
SQUAMOUS #/AREA URNS AUTO: ABNORMAL /LPF
UROBILINOGEN UR STRIP-ACNC: 0.2 E.U./DL
WBC # BLD AUTO: 11 10E3/UL (ref 4–11)
WBC #/AREA URNS AUTO: ABNORMAL /HPF

## 2024-10-01 PROCEDURE — G0103 PSA SCREENING: HCPCS | Performed by: INTERNAL MEDICINE

## 2024-10-01 PROCEDURE — 80061 LIPID PANEL: CPT | Performed by: INTERNAL MEDICINE

## 2024-10-01 PROCEDURE — 99406 BEHAV CHNG SMOKING 3-10 MIN: CPT | Mod: 25 | Performed by: INTERNAL MEDICINE

## 2024-10-01 PROCEDURE — 80053 COMPREHEN METABOLIC PANEL: CPT | Performed by: INTERNAL MEDICINE

## 2024-10-01 PROCEDURE — 36415 COLL VENOUS BLD VENIPUNCTURE: CPT | Performed by: INTERNAL MEDICINE

## 2024-10-01 PROCEDURE — 85027 COMPLETE CBC AUTOMATED: CPT | Performed by: INTERNAL MEDICINE

## 2024-10-01 PROCEDURE — 99215 OFFICE O/P EST HI 40 MIN: CPT | Mod: 25 | Performed by: INTERNAL MEDICINE

## 2024-10-01 PROCEDURE — 99396 PREV VISIT EST AGE 40-64: CPT | Performed by: INTERNAL MEDICINE

## 2024-10-01 PROCEDURE — 81001 URINALYSIS AUTO W/SCOPE: CPT | Performed by: INTERNAL MEDICINE

## 2024-10-01 RX ORDER — HYDROCORTISONE 25 MG/G
CREAM TOPICAL 2 TIMES DAILY PRN
Qty: 30 G | Refills: 1 | Status: SHIPPED | OUTPATIENT
Start: 2024-10-01

## 2024-10-01 RX ORDER — POLYETHYLENE GLYCOL 3350 17 G/17G
1 POWDER, FOR SOLUTION ORAL DAILY
Qty: 500 G | Refills: 0 | Status: SHIPPED | OUTPATIENT
Start: 2024-10-01

## 2024-10-01 RX ORDER — AMLODIPINE BESYLATE 5 MG/1
5 TABLET ORAL DAILY
Qty: 90 TABLET | Refills: 1 | Status: SHIPPED | OUTPATIENT
Start: 2024-10-01

## 2024-10-01 ASSESSMENT — PAIN SCALES - GENERAL: PAINLEVEL: MODERATE PAIN (4)

## 2024-10-01 NOTE — PROGRESS NOTES
Preventive Care Visit  [facility] KALA Albarran MD, Internal Medicine  Oct 1, 2024          Assessment and Plan    1. Routine general medical examination at a health care facility    Patient is new to me, last seen our group in May 2024 for a video visit on blood pressure follow-up.  Does have risk factors of coronary artery disease, has been on lisinopril 40 mg daily, metoprolol 25 mg twice daily, Imdur 60 mg daily.  Patient is opting this to be changed to his annual physical, will do as requested.    Concerns of uncontrolled BP which I have discussed most part of this appt to get him on the right medication as add on for his current BP uncontrolled inspite of current medications as mentioned below.    Patient does have upcoming colonoscopy for his ongoing rectal bleeding as scheduled in 10 days.    - hydrocortisone, Perianal, (HYDROCORTISONE) 2.5 % cream; Place rectally 2 times daily as needed for hemorrhoids.  Dispense: 30 g; Refill: 1  - Lipid panel reflex to direct LDL Fasting; Future  - CBC with platelets; Future  - Comprehensive metabolic panel (BMP + Alb, Alk Phos, ALT, AST, Total. Bili, TP); Future  - PSA, screen; Future    2. Coronary artery disease involving native coronary artery of native heart with angina pectoris (H)  3. S/P coronary artery stent placement  Chronic stable, patient following cardiology.  Currently on DAPT with no concerns of angina at this time.  Continue to follow cardiology recommendations, reviewed the recent cardiology visit.    4. Essential hypertension  Chronic problem, uncontrolled in spite of current 4 medications.  Given the diastolic blood pressures remaining-consider amlodipine low-dose at this time.  Follow-up instructions given.  - Comprehensive metabolic panel (BMP + Alb, Alk Phos, ALT, AST, Total. Bili, TP); Future  - amLODIPine (NORVASC) 5 MG tablet; Take 1 tablet (5 mg) by mouth daily.  Dispense: 90 tablet; Refill: 1    5. Rectal  bleeding  6. External hemorrhoids  Ongoing problem, uncontrolled.  Patient endorses that he already has able to feel 3 external hemorrhoids  and he is already using his Preparation H which is not helping much.    - Emphasized to continue the same at this time and get this colonoscopy done with colorectal specialist to fix his both external/internal hemorrhoids if any.  Patient understood and agreed the plan.  - hydrocortisone, Perianal, (HYDROCORTISONE) 2.5 % cream; Place rectally 2 times daily as needed for hemorrhoids.  Dispense: 30 g; Refill: 1  - CBC with platelets; Future    7. Constipation, unspecified constipation type  Chronic problem, will consider adding daily MiraLAX which patient states he takes only as needed.  Emphasized to get this controlled so that his hemorrhoids will not be worsening.  Patient understood and agreed the plan.  - polyethylene glycol (MIRALAX) 17 GM/Dose powder; Take 17 g (1 Capful) by mouth daily.  Dispense: 500 g; Refill: 0    8. Suprapubic tenderness  New problem, physical exam positive for suprapubic tenderness which patient endorses could be an overlap of rectal bleed versus possible UTI cannot be excluded.  - UA with Microscopic reflex to Culture - lab collect; Future    9. IFG (impaired fasting glucose)  - Hemoglobin A1c; Future    11. Dyslipidemia  Chronic stable , continue current Lipitor as managed by Cardiology.   - Lipid panel reflex to direct LDL Fasting; Future    12. Encounter for prostate cancer screening  - PSA, screen; Future    10. Tobacco abuse  13. Nicotine dependence, uncomplicated, unspecified nicotine product type  Patient was counseled on smoking cessation, we discussed the benefits of quitting - Pt does endorses that he Started at 18 - 19 yrs age,. 1 PPD >> currently 8 Cig / day.Also encouraged to set a stop date.Pt not opting on any pharmacotherapies offered as he believes nothing helping him. All risks understood. This took 3 minutes duration.    - Shared  decision to check for lung cancer CT scan in the upcoming follow-up visit due to time constraint.  - SMOKING CESSATION COUNSELING 3-10 MIN    14. Microscopic hematuria  New problem, I do not see any previous urinalysis done for this patient in the past.  Does show moderate hematuria, most likely contamination with the ongoing rectal bleed as patient endorses above.  Will recheck in another instance before considering CT renal.  Patient will be notified.  - UA with Microscopic reflex to Culture - lab collect; Future        Over 40 minutes spent outside the preventive visit ( 20 minutes ) on reviewing patient chart,  face to face encounter, greater than 50% time spent with plan/cordination of care and documentation as above in my A/P.        Please note that this note consists of symbols derived from keyboarding, dictation and/or voice recognition software. As a result, there may be errors in the script that have gone undetected. Please consider this when interpreting information found in this chart.    Patient Instructions   As discussed , please do fasting labs placed    Will consider adding Amlodipine to your current medications .     Please send me BP log for next 10 days by checking twice daily >> to further adjust the dose if need.     Recommend to keep up the upcoming appointment with colonoscopy to make sure your rectal bleeding is resolved with the fixing of your internal and external hemorrhoids which could be causing this.    Make sure you avoid your constipation, sent in the laxatives as discussed.    ==================================    Patient Education  Preventive Care Advice   This is general advice given by our system to help you stay healthy. However, your care team may have specific advice just for you. Please talk to your care team about your preventive care needs.  Nutrition  Eat 5 or more servings of fruits and vegetables each day.  Try wheat bread, brown rice and whole grain pasta (instead of  white bread, rice, and pasta).  Get enough calcium and vitamin D. Check the label on foods and aim for 100% of the RDA (recommended daily allowance).  Lifestyle  Exercise at least 150 minutes each week  (30 minutes a day, 5 days a week).  Do muscle strengthening activities 2 days a week. These help control your weight and prevent disease.  No smoking.  Wear sunscreen to prevent skin cancer.  Have a dental exam and cleaning every 6 months.  Yearly exams  See your health care team every year to talk about:  Any changes in your health.  Any medicines your care team has prescribed.  Preventive care, family planning, and ways to prevent chronic diseases.  Shots (vaccines)   HPV shots (up to age 26), if you've never had them before.  Hepatitis B shots (up to age 59), if you've never had them before.  COVID-19 shot: Get this shot when it's due.  Flu shot: Get a flu shot every year.  Tetanus shot: Get a tetanus shot every 10 years.  Pneumococcal, hepatitis A, and RSV shots: Ask your care team if you need these based on your risk.  Shingles shot (for age 50 and up)  General health tests  Diabetes screening:  Starting at age 35, Get screened for diabetes at least every 3 years.  If you are younger than age 35, ask your care team if you should be screened for diabetes.  Cholesterol test: At age 39, start having a cholesterol test every 5 years, or more often if advised.  Bone density scan (DEXA): At age 50, ask your care team if you should have this scan for osteoporosis (brittle bones).  Hepatitis C: Get tested at least once in your life.  STIs (sexually transmitted infections)  Before age 24: Ask your care team if you should be screened for STIs.  After age 24: Get screened for STIs if you're at risk. You are at risk for STIs (including HIV) if:  You are sexually active with more than one person.  You don't use condoms every time.  You or a partner was diagnosed with a sexually transmitted infection.  If you are at risk for  HIV, ask about PrEP medicine to prevent HIV.  Get tested for HIV at least once in your life, whether you are at risk for HIV or not.  Cancer screening tests  Cervical cancer screening: If you have a cervix, begin getting regular cervical cancer screening tests starting at age 21.  Breast cancer scan (mammogram): If you've ever had breasts, begin having regular mammograms starting at age 40. This is a scan to check for breast cancer.  Colon cancer screening: It is important to start screening for colon cancer at age 45.  Have a colonoscopy test every 10 years (or more often if you're at risk) Or, ask your provider about stool tests like a FIT test every year or Cologuard test every 3 years.  To learn more about your testing options, visit:   .  For help making a decision, visit:   https://bit.ly/cp81157.  Prostate cancer screening test: If you have a prostate, ask your care team if a prostate cancer screening test (PSA) at age 55 is right for you.  Lung cancer screening: If you are a current or former smoker ages 50 to 80, ask your care team if ongoing lung cancer screenings are right for you.  For informational purposes only. Not to replace the advice of your health care provider. Copyright   2023 [employer]. All rights reserved. Clinically reviewed by the [facility]. [employer] 891977 - REV 01/24.     Return in about 3 months (around 1/1/2025), or if symptoms worsen or fail to improve, for BP Recheck.    Alyssa Albarran MD  [facility] KALA ELLA Alvarez   Candelario is a 53 year old, presenting for the following:  Hypertension, Rectal Problem (Bleeding even when not going to the bathroom, happens 2 times week), and Hemorrhoids        10/1/2024    11:17 AM   Additional Questions   Roomed by Cherie   Accompanied by Irene        Health Care Directive  Patient does not have a Health Care Directive or Living Will:  N/A     History of Present Illness        Hypertension: He presents for follow up of hypertension.  He does check blood pressure  regularly outside of the clinic. Outside blood pressures have been over 140/90. He follows a low salt diet.     He eats 0-1 servings of fruits and vegetables daily.He exercises with enough effort to increase his heart rate 9 or less minutes per day.    He is taking medications regularly.    Constipation  Onset/Duration: 6 months   Description:  Frequency of bowel movements: on a daily basis  Consistency of stool: soft   Progression of Symptoms: waxing and waning  Accompanying signs and symptoms:    Abdominal pain: YES   Rectal pain: YES   Blood in stool: YES   Nausea/Vomiting: YES, nausea   Weight loss or gain: No  History:   Similar problems in past: YES  History of abdominal surgery: No  Chronic laxative use: YES  New medications: No  Precipitating or alleviating factors: N/a   Therapies tried and outcome: Demi lax        1/18/2024   Social Factors   Worry food won't last until get money to buy more No   Food not last or not have enough money for food? No   Do you have housing? (Housing is defined as stable permanent housing and does not include staying ouside in a car, in a tent, in an abandoned building, in an overnight shelter, or couch-surfing.) Yes   Are you worried about losing your housing? No   Lack of transportation? No   Unable to get utilities (heat,electricity)? No            Today's PHQ-2 Score:       1/9/2024    10:20 AM   PHQ-2 ( 1999 [employer])   Q1: Little interest or pleasure in doing things 0   Q2: Feeling down, depressed or hopeless 0   PHQ-2 Score 0         1/18/2024   Substance Use   If I could quit smoking, I would Somewhat agree   I want to quit somking, worry about health affects Somewhat agree   Willing to make a plan to quit smoking Somewhat agree   Willing to cut down before quitting Somewhat agree        Social History     Tobacco Use    Smoking status: Every Day     Current packs/day: 1.00      Types: Cigarettes    Smokeless tobacco: Never    Tobacco comments:     Started at 18 - 19 yrs age,. 1 PPD >> currently 8 Cig / day .    Substance Use Topics    Alcohol use: Not Currently    Drug use: Not Currently       ASCVD Risk   The ASCVD Risk score (Jay BENNETT, et al., 2019) failed to calculate for the following reasons:    The valid total cholesterol range is 130 to 320 mg/dL      Reviewed and updated as needed this visit by Provider   Tobacco  Allergies  Meds  Problems  Med Hx  Surg Hx  Fam Hx            Past Medical History:   Diagnosis Date    CAD (coronary artery disease)     STEMI (ST elevation myocardial infarction) (H)     Third degree heart block (H)     Tobacco abuse 3/31/2020     Past Surgical History:   Procedure Laterality Date    CV CORONARY ANGIOGRAM N/A 3/5/2020    Procedure: Coronary Angiogram;  Surgeon: Fabricio Mata MD;  Location: [facility] CARDIAC CATH LAB    CV CORONARY ANGIOGRAM N/A 4/9/2020    Procedure: Coronary Angiogram;  Surgeon: Evens Figueredo MD;  Location:  HEART CARDIAC CATH LAB    CV HEART CATHETERIZATION WITH POSSIBLE INTERVENTION N/A 3/5/2020    Procedure: Coronary Angiogram;  Surgeon: Fabricio Mata MD;  Location: [facility] CARDIAC CATH LAB    CV HEART CATHETERIZATION WITH POSSIBLE INTERVENTION N/A 3/5/2020    Procedure: Heart Catheterization with Possible Intervention;  Surgeon: Fabricio Mata MD;  Location: [facility] CARDIAC CATH LAB    CV INTRAVASULAR ULTRASOUND N/A 4/9/2020    Procedure: Intravascular Ultrasound;  Surgeon: Evens Figueredo MD;  Location: [facility] CARDIAC CATH LAB    CV LEFT HEART CATH N/A 4/9/2020    Procedure: Left Heart Cath;  Surgeon: Evens Figueredo MD;  Location: [facility] CARDIAC CATH LAB    CV PCI ATHERECTOMY ORBITAL N/A 4/9/2020    Procedure: Percutaneous Coronary Intervention Atherectomy Rotational;  Surgeon: Evens Figueredo MD;  Location: [facility] CARDIAC CATH LAB    CV PCI  STENT DRUG ELUTING N/A 3/5/2020    Procedure: Percutaneous Coronary Intervention Stent Drug Eluting;  Surgeon: Fabricio Mata MD;  Location:  HEART CARDIAC CATH LAB    CV PCI STENT DRUG ELUTING N/A 4/9/2020    Procedure: Percutaneous Coronary Intervention Stent Drug Eluting;  Surgeon: Evens Figueredo MD;  Location: [facility] CARDIAC CATH LAB    CV TEMPORARY PACEMAKER INSERTION N/A 3/5/2020    Procedure: Temporary Pacemaker Insertion;  Surgeon: Fabricio Mata MD;  Location:  HEART CARDIAC CATH LAB    CV TEMPORARY PACEMAKER INSERTION N/A 3/5/2020    Procedure: Temporary Pacemaker Insertion;  Surgeon: Fabricio Mata MD;  Location: [facility] CARDIAC CATH LAB     Lab work is in process  Labs reviewed in EPIC  BP Readings from Last 3 Encounters:   10/01/24 (!) 140/108   01/18/24 130/78   01/09/24 138/88    Wt Readings from Last 3 Encounters:   10/01/24 77.6 kg (171 lb)   01/18/24 77.6 kg (171 lb)   01/09/24 78.5 kg (173 lb)                  Patient Active Problem List   Diagnosis    History of ST elevation myocardial infarction (STEMI) S/P Stents    Tobacco abuse    Coronary artery disease involving native coronary artery of native heart with angina pectoris (H)    S/P coronary artery stent placement     Past Surgical History:   Procedure Laterality Date    CV CORONARY ANGIOGRAM N/A 3/5/2020    Procedure: Coronary Angiogram;  Surgeon: Fabricio Mata MD;  Location: [facility] CARDIAC CATH LAB    CV CORONARY ANGIOGRAM N/A 4/9/2020    Procedure: Coronary Angiogram;  Surgeon: Evens Figueredo MD;  Location:  HEART CARDIAC CATH LAB    CV HEART CATHETERIZATION WITH POSSIBLE INTERVENTION N/A 3/5/2020    Procedure: Coronary Angiogram;  Surgeon: Fabricio Mata MD;  Location:  HEART CARDIAC CATH LAB    CV HEART CATHETERIZATION WITH POSSIBLE INTERVENTION N/A 3/5/2020    Procedure: Heart Catheterization with Possible Intervention;  Surgeon: Fabricio Mata MD;   Location:  HEART CARDIAC CATH LAB    CV INTRAVASULAR ULTRASOUND N/A 4/9/2020    Procedure: Intravascular Ultrasound;  Surgeon: Evens Figueredo MD;  Location:  HEART CARDIAC CATH LAB    CV LEFT HEART CATH N/A 4/9/2020    Procedure: Left Heart Cath;  Surgeon: Evens Figueredo MD;  Location:  HEART CARDIAC CATH LAB    CV PCI ATHERECTOMY ORBITAL N/A 4/9/2020    Procedure: Percutaneous Coronary Intervention Atherectomy Rotational;  Surgeon: Evens Figueredo MD;  Location:  HEART CARDIAC CATH LAB    CV PCI STENT DRUG ELUTING N/A 3/5/2020    Procedure: Percutaneous Coronary Intervention Stent Drug Eluting;  Surgeon: Fabricio Mata MD;  Location:  HEART CARDIAC CATH LAB    CV PCI STENT DRUG ELUTING N/A 4/9/2020    Procedure: Percutaneous Coronary Intervention Stent Drug Eluting;  Surgeon: Evens Figueredo MD;  Location:  HEART CARDIAC CATH LAB    CV TEMPORARY PACEMAKER INSERTION N/A 3/5/2020    Procedure: Temporary Pacemaker Insertion;  Surgeon: Fabricio Mata MD;  Location:  HEART CARDIAC CATH LAB    CV TEMPORARY PACEMAKER INSERTION N/A 3/5/2020    Procedure: Temporary Pacemaker Insertion;  Surgeon: Fabricio Mata MD;  Location:  HEART CARDIAC CATH LAB       Social History     Tobacco Use    Smoking status: Every Day     Current packs/day: 1.00     Types: Cigarettes    Smokeless tobacco: Never    Tobacco comments:     Started at 18 - 19 yrs age,. 1 PPD >> currently 8 Cig / day .    Substance Use Topics    Alcohol use: Not Currently     Family History   Problem Relation Age of Onset    Prostate Cancer Father     Coronary Artery Disease Brother     Colon Cancer Brother          Current Outpatient Medications   Medication Sig Dispense Refill    amLODIPine (NORVASC) 5 MG tablet Take 1 tablet (5 mg) by mouth daily. 90 tablet 1    ASPIRIN ADULT LOW STRENGTH 81 MG EC tablet TAKE 1 TABLET (81 MG) BY MOUTH DAILY 100 tablet 2    atorvastatin (LIPITOR) 40 MG tablet  Take 1 tablet (40 mg) by mouth daily 90 tablet 3    hydrocortisone, Perianal, (HYDROCORTISONE) 2.5 % cream Place rectally 2 times daily as needed for hemorrhoids. 30 g 1    isosorbide mononitrate (IMDUR) 60 MG 24 hr tablet Take 1 tablet (60 mg) by mouth daily 90 tablet 2    lisinopril (ZESTRIL) 40 MG tablet Take 1 tablet (40 mg) by mouth daily 90 tablet 1    metoprolol tartrate (LOPRESSOR) 25 MG tablet Take 1 tablet (25 mg) by mouth 2 times daily 180 tablet 3    multivitamin, therapeutic (THERA-VIT) TABS tablet Take 1 tablet by mouth Takes when remembers, usually once weekly.      nitroGLYcerin (NITROSTAT) 0.4 MG sublingual tablet For chest pain place 1 tablet under the tongue every 5 minutes for 3 doses. If symptoms persist 5 minutes after 2 nd dose call 911. 30 tablet 3    polyethylene glycol (MIRALAX) 17 GM/Dose powder Take 17 g (1 Capful) by mouth daily. 500 g 0    ticagrelor (BRILINTA) 60 MG tablet Take 1 tablet (60 mg) by mouth 2 times daily 180 tablet 3    cetirizine (ZYRTEC) 10 MG tablet Take 1 tablet (10 mg) by mouth daily Appointment needed to establish care with new primary provider (Patient not taking: Reported on 10/1/2024) 30 tablet 0     No Known Allergies  Recent Labs   Lab Test 01/18/24  0940 01/05/24  0930 12/19/22  0903 12/01/21  1541 11/30/20  0922 11/16/20  1411 04/09/20  0700 04/09/20  0700 03/07/20  0621 03/06/20  0400 03/05/20  1843   A1C  --   --   --   --   --   --   --   --   --  5.7* 5.7*   LDL  --  48 47 26 45  --   --   --   --   --  146*   HDL  --  35* 33* 32* 31*  --   --   --   --   --  35*   TRIG  --  78 93 262* 78  --   --   --   --   --  43   ALT 37  --  54  --  51 54   < >  --   --   --   --    CR 0.89  --   --  1.02  --  0.96  --  0.85   < > 0.81  --    GFRESTIMATED >90  --   --  86  --  >90  --  >90   < > >90  --    GFRESTBLACK  --   --   --   --   --  >90  --  >90   < > >90  --    POTASSIUM 4.7  --   --  4.8  --  3.9  --  4.2   < > 3.8  --     < > = values in this interval not  "displayed.          Review of Systems  Constitutional, HEENT, cardiovascular, pulmonary, GI, , musculoskeletal, neuro, skin, endocrine and psych systems are negative, except as otherwise noted.     Objective    Exam  BP (!) 140/108   Pulse 62   Temp 97.6  F (36.4  C) (Tympanic)   Resp 14   Ht 1.676 m (5' 6\")   Wt 77.6 kg (171 lb)   SpO2 100%   BMI 27.60 kg/m     Estimated body mass index is 27.6 kg/m  as calculated from the following:    Height as of this encounter: 1.676 m (5' 6\").    Weight as of this encounter: 77.6 kg (171 lb).    Physical Exam  GENERAL: alert and no distress  EYES: Eyes grossly normal to inspection, PERRL and conjunctivae and sclerae normal  HENT: ear canals and TM's normal, nose and mouth without ulcers or lesions  NECK: no adenopathy, no asymmetry, masses, or scars  RESP: lungs clear to auscultation - no rales, rhonchi or wheezes  CV: regular rate and rhythm, normal S1 S2, no S3 or S4, no murmur, click or rub, no peripheral edema  ABDOMEN: soft, nontender, no hepatosplenomegaly, no masses and bowel sounds normal  RECTAL : Deferred   MS: no gross musculoskeletal defects noted, no edema  SKIN: no suspicious lesions or rashes  NEURO: Normal strength and tone, mentation intact and speech normal  PSYCH: mentation appears normal, affect normal/bright        Signed Electronically by: Alyssa Albarran MD        "

## 2024-10-01 NOTE — PATIENT INSTRUCTIONS
As discussed , please do fasting labs placed    Will consider adding Amlodipine to your current medications .     Please send me BP log for next 10 days by checking twice daily >> to further adjust the dose if need.     Recommend to keep up the upcoming appointment with colonoscopy to make sure your rectal bleeding is resolved with the fixing of your internal and external hemorrhoids which could be causing this.    Make sure you avoid your constipation, sent in the laxatives as discussed.    ==================================    Patient Education   Preventive Care Advice   This is general advice given by our system to help you stay healthy. However, your care team may have specific advice just for you. Please talk to your care team about your preventive care needs.  Nutrition  Eat 5 or more servings of fruits and vegetables each day.  Try wheat bread, brown rice and whole grain pasta (instead of white bread, rice, and pasta).  Get enough calcium and vitamin D. Check the label on foods and aim for 100% of the RDA (recommended daily allowance).  Lifestyle  Exercise at least 150 minutes each week  (30 minutes a day, 5 days a week).  Do muscle strengthening activities 2 days a week. These help control your weight and prevent disease.  No smoking.  Wear sunscreen to prevent skin cancer.  Have a dental exam and cleaning every 6 months.  Yearly exams  See your health care team every year to talk about:  Any changes in your health.  Any medicines your care team has prescribed.  Preventive care, family planning, and ways to prevent chronic diseases.  Shots (vaccines)   HPV shots (up to age 26), if you've never had them before.  Hepatitis B shots (up to age 59), if you've never had them before.  COVID-19 shot: Get this shot when it's due.  Flu shot: Get a flu shot every year.  Tetanus shot: Get a tetanus shot every 10 years.  Pneumococcal, hepatitis A, and RSV shots: Ask your care team if you need these based on your  risk.  Shingles shot (for age 50 and up)  General health tests  Diabetes screening:  Starting at age 35, Get screened for diabetes at least every 3 years.  If you are younger than age 35, ask your care team if you should be screened for diabetes.  Cholesterol test: At age 39, start having a cholesterol test every 5 years, or more often if advised.  Bone density scan (DEXA): At age 50, ask your care team if you should have this scan for osteoporosis (brittle bones).  Hepatitis C: Get tested at least once in your life.  STIs (sexually transmitted infections)  Before age 24: Ask your care team if you should be screened for STIs.  After age 24: Get screened for STIs if you're at risk. You are at risk for STIs (including HIV) if:  You are sexually active with more than one person.  You don't use condoms every time.  You or a partner was diagnosed with a sexually transmitted infection.  If you are at risk for HIV, ask about PrEP medicine to prevent HIV.  Get tested for HIV at least once in your life, whether you are at risk for HIV or not.  Cancer screening tests  Cervical cancer screening: If you have a cervix, begin getting regular cervical cancer screening tests starting at age 21.  Breast cancer scan (mammogram): If you've ever had breasts, begin having regular mammograms starting at age 40. This is a scan to check for breast cancer.  Colon cancer screening: It is important to start screening for colon cancer at age 45.  Have a colonoscopy test every 10 years (or more often if you're at risk) Or, ask your provider about stool tests like a FIT test every year or Cologuard test every 3 years.  To learn more about your testing options, visit:   .  For help making a decision, visit:   https://bit.ly/ez57446.  Prostate cancer screening test: If you have a prostate, ask your care team if a prostate cancer screening test (PSA) at age 55 is right for you.  Lung cancer screening: If you are a current or former smoker ages 50  to 80, ask your care team if ongoing lung cancer screenings are right for you.  For informational purposes only. Not to replace the advice of your health care provider. Copyright   2023 [employer]. All rights reserved. Clinically reviewed by the [facility]. [employer] [id number] - REV 01/24.

## 2024-10-02 LAB
ALBUMIN SERPL BCG-MCNC: 4.4 G/DL (ref 3.5–5.2)
ALP SERPL-CCNC: 74 U/L (ref 40–150)
ALT SERPL W P-5'-P-CCNC: 46 U/L (ref 0–70)
ANION GAP SERPL CALCULATED.3IONS-SCNC: 10 MMOL/L (ref 7–15)
AST SERPL W P-5'-P-CCNC: 27 U/L (ref 0–45)
BILIRUB SERPL-MCNC: 0.8 MG/DL
BUN SERPL-MCNC: 15.6 MG/DL (ref 6–20)
CALCIUM SERPL-MCNC: 9.5 MG/DL (ref 8.8–10.4)
CHLORIDE SERPL-SCNC: 103 MMOL/L (ref 98–107)
CHOLEST SERPL-MCNC: 104 MG/DL
CREAT SERPL-MCNC: 1.05 MG/DL (ref 0.67–1.17)
EGFRCR SERPLBLD CKD-EPI 2021: 86 ML/MIN/1.73M2
FASTING STATUS PATIENT QL REPORTED: YES
FASTING STATUS PATIENT QL REPORTED: YES
GLUCOSE SERPL-MCNC: 93 MG/DL (ref 70–99)
HCO3 SERPL-SCNC: 26 MMOL/L (ref 22–29)
HDLC SERPL-MCNC: 37 MG/DL
LDLC SERPL CALC-MCNC: 50 MG/DL
NONHDLC SERPL-MCNC: 67 MG/DL
POTASSIUM SERPL-SCNC: 4.9 MMOL/L (ref 3.4–5.3)
PROT SERPL-MCNC: 6.7 G/DL (ref 6.4–8.3)
PSA SERPL DL<=0.01 NG/ML-MCNC: 0.56 NG/ML (ref 0–3.5)
SODIUM SERPL-SCNC: 139 MMOL/L (ref 135–145)
TRIGL SERPL-MCNC: 86 MG/DL

## 2024-10-08 ENCOUNTER — TELEPHONE (OUTPATIENT)
Dept: GASTROENTEROLOGY | Facility: CLINIC | Age: 54
End: 2024-10-08
Payer: COMMERCIAL

## 2024-10-08 DIAGNOSIS — Z12.11 ENCOUNTER FOR SCREENING COLONOSCOPY: Primary | ICD-10-CM

## 2024-10-08 NOTE — TELEPHONE ENCOUNTER
Message sent to Wendie to review chart.  Uncontrolled BP, recent ED and Primary care visit.  Dosage change to BP meds at most recent visit.  Is patient checking BP at home?    Patient prefers     Pre visit planning completed.    Procedure details:    Patient scheduled for Colonoscopy on 10/18/2024.     Arrival time: 1145. Procedure time 1230    Facility location: [facility]; [facility] [place] [address]. Check in location: 1st [facility].     Sedation type: Conscious sedation     Pre op exam needed? No.    Indication for procedure: screening       Chart review:     Electronic implanted devices? No    Recent diagnosis of diverticulitis within the last 6 weeks? No      Medication review:    Diabetic? No    Anticoagulants? Yes Ticagrelor (Brilinta). Recommended HOLD 5 days before procedure.  Consult with your managing provider.    Weight loss medication/injectable? No.    Other medication HOLDING recommendations:  N/A      Prep for procedure:       Prep instructions sent via [employer]     Needs Extended prep due to chronic constipation.  See additional notes for details.    Addendum in red. Kaylyn Chávez RN on 10/10/2024 at 8:52 AM      Carolina Gunderson RN  Endoscopy Procedure Pre Assessment RN  [phone] option 2

## 2024-10-08 NOTE — TELEPHONE ENCOUNTER
Message back from Antonia at [facility]    We do not have to postpone.  Pressure will be assessed when he is here.  Thanks,  Antonia Gunderson RN

## 2024-10-08 NOTE — TELEPHONE ENCOUNTER
utilized for call ID:[id number]      Attempted to contact patient in order to complete pre assessment questions.     No answer. Left message to return call to [phone] option 2    Pre-op needed? No.    Callback communication sent via [employer].    Jessica Wolfe RN  Endoscopy Procedure Pre Assessment

## 2024-10-10 RX ORDER — BISACODYL 5 MG/1
TABLET, DELAYED RELEASE ORAL
Qty: 4 TABLET | Refills: 0 | Status: SHIPPED | OUTPATIENT
Start: 2024-10-10

## 2024-10-10 NOTE — TELEPHONE ENCOUNTER
"Patient requested  per prior notes.     Utilized  ID#:[id number] to place call.     Second attempt to complete pre assessment.    No answer. Left message to return call to [phone] option 2.    Callback required communication sent via [employer].    Of note, chronic constipation is noted per 10/1/24 office visit notes: \"Constipation, unspecified constipation type  Chronic problem, will consider adding daily MiraLAX which patient states he takes only as needed.  Emphasized to get this controlled so that his hemorrhoids will not be worsening.  Patient understood and agreed the plan.\"    Due to documented constipation, patient should have Extended Golytely prep.     Prep for procedure:    Bowel prep recommendation: Extended Golytely. Bowel prep prescription sent to [facility] [address]   Due to: constipation noted or reported.     Updated prep instructions sent via [employer].  Patient has not read the previously sent instructions (Miralax) - ensure patient knows to follow the extended golytely prep.       Kaylyn Chávez RN  Endoscopy Procedure Pre Assessment RN  [phone] option 2  "

## 2024-10-14 NOTE — TELEPHONE ENCOUNTER
Pre assessment completed for upcoming procedure.   (Please see previous telephone encounter notes for complete details)    Patient  returned call.  DECLINED needing a  for this return PA call.       Procedure details:    Arrival time and facility location reviewed.    Pre op exam needed? No.    Designated  policy reviewed. Instructed to have someone stay 6  hours post procedure.       Medication review:    Blood thinner/Anti-platelet medication(s):  Ticagrelor (Brilinta). Recommended HOLD 5 days before procedure.  Consult with your managing provider.      Prep for procedure:     Procedure prep instructions reviewed.        Any additional information needed:  N/A      Patient  verbalized understanding and had no questions or concerns at this time.      Ayesha Cooper RN  Endoscopy Procedure Pre Assessment   [phone] option 2

## 2024-10-18 ENCOUNTER — HOSPITAL ENCOUNTER (OUTPATIENT)
Facility: CLINIC | Age: 54
Discharge: HOME | End: 2024-10-18
Attending: COLON & RECTAL SURGERY | Admitting: COLON & RECTAL SURGERY
Payer: COMMERCIAL

## 2024-10-18 VITALS
RESPIRATION RATE: 11 BRPM | HEART RATE: 60 BPM | OXYGEN SATURATION: 97 % | WEIGHT: 171 LBS | BODY MASS INDEX: 27.48 KG/M2 | SYSTOLIC BLOOD PRESSURE: 96 MMHG | HEIGHT: 66 IN | DIASTOLIC BLOOD PRESSURE: 73 MMHG

## 2024-10-18 LAB — COLONOSCOPY: NORMAL

## 2024-10-18 PROCEDURE — 99153 MOD SED SAME PHYS/QHP EA: CPT | Performed by: COLON & RECTAL SURGERY

## 2024-10-18 PROCEDURE — 45385 COLONOSCOPY W/LESION REMOVAL: CPT | Performed by: COLON & RECTAL SURGERY

## 2024-10-18 PROCEDURE — 88305 TISSUE EXAM BY PATHOLOGIST: CPT | Mod: TC | Performed by: COLON & RECTAL SURGERY

## 2024-10-18 PROCEDURE — 88305 TISSUE EXAM BY PATHOLOGIST: CPT | Mod: 26 | Performed by: PATHOLOGY

## 2024-10-18 PROCEDURE — G0500 MOD SEDAT ENDO SERVICE >5YRS: HCPCS | Performed by: COLON & RECTAL SURGERY

## 2024-10-18 PROCEDURE — 250N000011 HC RX IP 250 OP 636: Performed by: COLON & RECTAL SURGERY

## 2024-10-18 RX ORDER — FENTANYL CITRATE 50 UG/ML
INJECTION, SOLUTION INTRAMUSCULAR; INTRAVENOUS PRN
Status: DISCONTINUED | OUTPATIENT
Start: 2024-10-18 | End: 2024-10-18 | Stop reason: HOSPADM

## 2024-10-18 ASSESSMENT — ACTIVITIES OF DAILY LIVING (ADL)
ADLS_ACUITY_SCORE: 35

## 2024-10-18 NOTE — H&P
Pre-Endoscopy History and Physical     Candelario Stahl MRN# [id number]   YOB: 1970 Age: 54 year old     Date of Procedure: 10/18/2024  Primary care provider: No Ref-Primary, Physician  Type of Endoscopy: Colonoscopy  Reason for Procedure: rectal bleeding, screening  Type of Anesthesia Anticipated: Moderate Sedation    HPI:    Candelario is a 54 year old male who will be undergoing the above procedure.      A history and physical has been performed. The patient's medications and allergies have been reviewed. The risks and benefits of the procedure and the sedation options and risks were discussed with the patient.  All questions were answered and informed consent was obtained.      He denies a personal or family history of anesthesia complications or bleeding disorders.     No Known Allergies     No current facility-administered medications for this encounter.       Patient Active Problem List   Diagnosis    History of ST elevation myocardial infarction (STEMI) S/P Stents    Tobacco abuse    Coronary artery disease involving native coronary artery of native heart with angina pectoris (H)    S/P coronary artery stent placement        Past Medical History:   Diagnosis Date    CAD (coronary artery disease)     STEMI (ST elevation myocardial infarction) (H)     Third degree heart block (H)     Tobacco abuse 3/31/2020        Past Surgical History:   Procedure Laterality Date    APPENDECTOMY  1978    CV CORONARY ANGIOGRAM N/A 03/05/2020    Procedure: Coronary Angiogram;  Surgeon: Fabricio Mata MD;  Location: [facility] CARDIAC CATH LAB    CV CORONARY ANGIOGRAM N/A 04/09/2020    Procedure: Coronary Angiogram;  Surgeon: Evens Figueredo MD;  Location: [facility] CARDIAC CATH LAB    CV HEART CATHETERIZATION WITH POSSIBLE INTERVENTION N/A 03/05/2020    Procedure: Coronary Angiogram;  Surgeon: Fabricio Mata MD;  Location: [facility] CARDIAC CATH LAB    CV HEART CATHETERIZATION WITH POSSIBLE  INTERVENTION N/A 03/05/2020    Procedure: Heart Catheterization with Possible Intervention;  Surgeon: Fabricio Mata MD;  Location:  HEART CARDIAC CATH LAB    CV INTRAVASULAR ULTRASOUND N/A 04/09/2020    Procedure: Intravascular Ultrasound;  Surgeon: Evens Figueredo MD;  Location:  HEART CARDIAC CATH LAB    CV LEFT HEART CATH N/A 04/09/2020    Procedure: Left Heart Cath;  Surgeon: Evens Figueredo MD;  Location:  HEART CARDIAC CATH LAB    CV PCI ATHERECTOMY ORBITAL N/A 04/09/2020    Procedure: Percutaneous Coronary Intervention Atherectomy Rotational;  Surgeon: Evens Figueredo MD;  Location:  HEART CARDIAC CATH LAB    CV PCI STENT DRUG ELUTING N/A 03/05/2020    Procedure: Percutaneous Coronary Intervention Stent Drug Eluting;  Surgeon: Fabricio Mata MD;  Location:  HEART CARDIAC CATH LAB    CV PCI STENT DRUG ELUTING N/A 04/09/2020    Procedure: Percutaneous Coronary Intervention Stent Drug Eluting;  Surgeon: Evens Figueredo MD;  Location:  HEART CARDIAC CATH LAB    CV TEMPORARY PACEMAKER INSERTION N/A 03/05/2020    Procedure: Temporary Pacemaker Insertion;  Surgeon: Fabricio Mata MD;  Location:  HEART CARDIAC CATH LAB    CV TEMPORARY PACEMAKER INSERTION N/A 03/05/2020    Procedure: Temporary Pacemaker Insertion;  Surgeon: Fabricio Mata MD;  Location:  HEART CARDIAC CATH LAB       Social History     Tobacco Use    Smoking status: Every Day     Current packs/day: 1.00     Types: Cigarettes    Smokeless tobacco: Never    Tobacco comments:     Started at 18 - 19 yrs age,. 1 PPD >> currently 8 Cig / day .    Substance Use Topics    Alcohol use: Not Currently       Family History   Problem Relation Age of Onset    Prostate Cancer Father     Colon Cancer Brother 48    Coronary Artery Disease Brother        REVIEW OF SYSTEMS:     5 point ROS negative except as noted above in HPI, including Gen., Resp., CV, GI &  system review.      PHYSICAL  "EXAM:   /77   Pulse 68   Resp 11   Ht 1.676 m (5' 6\")   Wt 77.6 kg (171 lb)   SpO2 99%   BMI 27.60 kg/m   Estimated body mass index is 27.6 kg/m  as calculated from the following:    Height as of this encounter: 1.676 m (5' 6\").    Weight as of this encounter: 77.6 kg (171 lb).   GENERAL APPEARANCE: healthy and alert  MENTAL STATUS: alert  AIRWAY EXAM: Mallampatti Class II (visualization of the soft palate, fauces, and uvula)  RESP: lungs clear to auscultation - no rales, rhonchi or wheezes  CV: regular rates and rhythm      IMPRESSION   ASA Class 2 - Mild systemic disease        PLAN:     Plan for colonoscopy. We discussed the risks, benefits and alternatives and the patient wished to proceed.    The above has been forwarded to the consulting provider.      Quentin Odell MD  [facility]  Phone: [phone]  Fax: [phone]  October 18, 2024    "

## 2024-10-22 LAB
PATH REPORT.COMMENTS IMP SPEC: NORMAL
PATH REPORT.COMMENTS IMP SPEC: NORMAL
PATH REPORT.FINAL DX SPEC: NORMAL
PATH REPORT.GROSS SPEC: NORMAL
PATH REPORT.MICROSCOPIC SPEC OTHER STN: NORMAL
PATH REPORT.RELEVANT HX SPEC: NORMAL
PHOTO IMAGE: NORMAL

## 2024-10-24 DIAGNOSIS — I25.10 CORONARY ARTERY DISEASE INVOLVING NATIVE CORONARY ARTERY OF NATIVE HEART WITHOUT ANGINA PECTORIS: ICD-10-CM

## 2024-10-24 DIAGNOSIS — I10 BENIGN ESSENTIAL HYPERTENSION: ICD-10-CM

## 2024-10-24 RX ORDER — ATORVASTATIN CALCIUM 40 MG/1
40 TABLET, FILM COATED ORAL DAILY
Qty: 90 TABLET | Refills: 0 | Status: SHIPPED | OUTPATIENT
Start: 2024-10-24

## 2024-10-24 RX ORDER — LISINOPRIL 40 MG/1
40 TABLET ORAL DAILY
Qty: 90 TABLET | Refills: 0 | Status: SHIPPED | OUTPATIENT
Start: 2024-10-24

## 2024-11-01 ENCOUNTER — PATIENT OUTREACH (OUTPATIENT)
Dept: GASTROENTEROLOGY | Facility: CLINIC | Age: 54
End: 2024-11-01
Payer: COMMERCIAL

## 2025-01-30 DIAGNOSIS — I10 BENIGN ESSENTIAL HYPERTENSION: ICD-10-CM

## 2025-01-30 DIAGNOSIS — I25.10 CORONARY ARTERY DISEASE INVOLVING NATIVE CORONARY ARTERY OF NATIVE HEART WITHOUT ANGINA PECTORIS: ICD-10-CM

## 2025-01-30 RX ORDER — LISINOPRIL 40 MG/1
40 TABLET ORAL DAILY
Qty: 90 TABLET | Refills: 1 | Status: SHIPPED | OUTPATIENT
Start: 2025-01-30

## 2025-01-30 RX ORDER — ATORVASTATIN CALCIUM 40 MG/1
40 TABLET, FILM COATED ORAL DAILY
Qty: 90 TABLET | Refills: 0 | Status: SHIPPED | OUTPATIENT
Start: 2025-01-30

## (undated) DEVICE — WIRE GUIDE 0.035"X260CM SAFE-T-J EXCHANGE G00517

## (undated) DEVICE — MANIFOLD KIT ANGIO AUTOMATED 014613

## (undated) DEVICE — CATH US OD 5FR OD SEC 2.9FR EAGLE EYE PLATINUM 0.014 85900P

## (undated) DEVICE — CATH ANGIO JUDKINS JL4 6FRX100CM INFINITI 534620T

## (undated) DEVICE — CATH LAUNCHER 6FR JR 4.0 LA6JR40

## (undated) DEVICE — CATH BALLOON NC EMERGE 2.50X20MM H7493926720250

## (undated) DEVICE — KIT HAND CONTROL ANGIOTOUCH ACIST 65CM AT-P65

## (undated) DEVICE — VALVE HEMOSTASIS .096" COPILOT MECH 1003331

## (undated) DEVICE — GW VASC 190CM .014IN HI-TRQ 1009660J

## (undated) DEVICE — CATH BALLOON NC EMERGE 3.00X15MM H7493926715300

## (undated) DEVICE — GUIDEWIRE VASC 0.014INX180CM RUNTHROUGH 25-1011

## (undated) DEVICE — CATH BALLOON EMERGE 2.5X15MM H7493918915250

## (undated) DEVICE — INTRODUCER SHEATH FAST-CATH 6FRX12CM 406103

## (undated) DEVICE — CATH GUIDING MDL  6F MACH1 JR4

## (undated) DEVICE — CATH EP PACEL 5FRX110CM 1MM RIGHT HEART CURVE 401763

## (undated) DEVICE — CATH GUIDING BLUE YELLOW PTFE 3DRC 6FRX100CM 67013000

## (undated) DEVICE — DEFIB PRO-PADZ LVP LQD GEL ADULT 8900-2105-01

## (undated) DEVICE — INTRO GLIDESHEATH SLENDER 6FR 10X45CM 60-1060

## (undated) DEVICE — .014IN X 180CM INTUITION HYDRO-TRACK GUIDEWIRE, STRAIGHT

## (undated) DEVICE — INFL DVC KIT W/10CC NITRO IN4530

## (undated) DEVICE — TOTE ANGIO CORP PC15AT SAN32CC83O

## (undated) DEVICE — SYR ANGIOGRAPHY MULTIUSE KIT ACIST 014612

## (undated) DEVICE — GUIDEWIRE VASC 0.035INX150CM INQWIRE J TIP IQ35F150J3F/A

## (undated) DEVICE — CATH BALLOON EMERGE 2.5X12MM H7493918912250

## (undated) DEVICE — CATHETER BURR ADVANCER DEVICE ROTAPRO 1.50MM X 135CM

## (undated) DEVICE — CATH BALLOON NC EMERGE 3.50X15MM H7493926715350

## (undated) DEVICE — Device

## (undated) DEVICE — CATH DIAG 4FR JL 4.5 538417

## (undated) DEVICE — CATH ANGIO INFINITI PIGTAIL 145 6 SH 6FRX110CM  534-652S

## (undated) DEVICE — CATH ANGIO INFINITI 3DRC 6FRX100CM 534676T

## (undated) DEVICE — STENT RESOLUTE ONYX DE 2.7FR 3.50X26MM RONYX DE35026UX: Type: IMPLANTABLE DEVICE | Status: NON-FUNCTIONAL

## (undated) DEVICE — GUIDEWIRE VASC 0.014"X300CM PLATINUM TIP 25-1013

## (undated) DEVICE — GUIDEWIRE VASC 325CM .014IN RTWR FLPY H80228240022

## (undated) DEVICE — 1.5MM X 15MM TAKERU RX PTCA BALLOON CATHETER

## (undated) DEVICE — CABLE ADAPTER PACING 6FT REMINGTON ADAP 2000

## (undated) DEVICE — CATH BALLOON NC EMERGE 3.25X12MM H7493926712320

## (undated) DEVICE — CATH BALLOON NC EMERGE 4.00X8MM H7493926708400

## (undated) DEVICE — CATH DIAGNOSTIC RADIAL 5FR TIG 4.0

## (undated) DEVICE — CATH ANGIO INFINITI 3DRC 4FRX100CM 538476

## (undated) DEVICE — INTRODUCER SHEATH OBTURATOR 5FRX13CM LF OBT-5F-11

## (undated) DEVICE — SLEEVE TR BAND RADIAL COMPRESSION DEVICE 24CM TRB24-REG

## (undated) DEVICE — CATH BALLOON NC EMERGE 2.75X20MM H7493926720270

## (undated) DEVICE — CATH GUIDELINER 6FR 5571

## (undated) DEVICE — CATH BALLOON EMERGE 3.0X12MM H7493918912300

## (undated) DEVICE — INTRO SHEATH 6FRX10CM PINNACLE RSS602

## (undated) DEVICE — CATH BALLOON EMERGE 3.0X20MM H7493918920300

## (undated) DEVICE — CATH LAUNCHER 6FR EBU 3.5 LA6EBU35

## (undated) DEVICE — INTRODUCER CATH VASC 5FRX10CM  MPIS-501-NT-U-SST

## (undated) DEVICE — RAD INFLATOR BASIC COMPAK  IN4130

## (undated) RX ORDER — VERAPAMIL HYDROCHLORIDE 2.5 MG/ML
INJECTION, SOLUTION INTRAVENOUS
Status: DISPENSED
Start: 2020-04-09

## (undated) RX ORDER — NITROGLYCERIN 5 MG/ML
VIAL (ML) INTRAVENOUS
Status: DISPENSED
Start: 2020-04-09

## (undated) RX ORDER — NITROGLYCERIN 5 MG/ML
VIAL (ML) INTRAVENOUS
Status: DISPENSED
Start: 2020-03-05

## (undated) RX ORDER — ONDANSETRON 2 MG/ML
INJECTION INTRAMUSCULAR; INTRAVENOUS
Status: DISPENSED
Start: 2020-03-05

## (undated) RX ORDER — PHENYLEPHRINE HCL IN 0.9% NACL 1 MG/10 ML
SYRINGE (ML) INTRAVENOUS
Status: DISPENSED
Start: 2020-03-05

## (undated) RX ORDER — FENTANYL CITRATE 50 UG/ML
INJECTION, SOLUTION INTRAMUSCULAR; INTRAVENOUS
Status: DISPENSED
Start: 2020-04-09

## (undated) RX ORDER — HEPARIN SODIUM 1000 [USP'U]/ML
INJECTION, SOLUTION INTRAVENOUS; SUBCUTANEOUS
Status: DISPENSED
Start: 2020-03-05

## (undated) RX ORDER — ASPIRIN 81 MG/1
TABLET, CHEWABLE ORAL
Status: DISPENSED
Start: 2020-03-05

## (undated) RX ORDER — FENTANYL CITRATE 50 UG/ML
INJECTION, SOLUTION INTRAMUSCULAR; INTRAVENOUS
Status: DISPENSED
Start: 2024-10-18

## (undated) RX ORDER — HEPARIN SODIUM 1000 [USP'U]/ML
INJECTION, SOLUTION INTRAVENOUS; SUBCUTANEOUS
Status: DISPENSED
Start: 2020-04-09

## (undated) RX ORDER — LIDOCAINE HYDROCHLORIDE 10 MG/ML
INJECTION, SOLUTION EPIDURAL; INFILTRATION; INTRACAUDAL; PERINEURAL
Status: DISPENSED
Start: 2020-03-05

## (undated) RX ORDER — HEPARIN SODIUM 200 [USP'U]/100ML
INJECTION, SOLUTION INTRAVENOUS
Status: DISPENSED
Start: 2020-04-09

## (undated) RX ORDER — LIDOCAINE HYDROCHLORIDE 10 MG/ML
INJECTION, SOLUTION EPIDURAL; INFILTRATION; INTRACAUDAL; PERINEURAL
Status: DISPENSED
Start: 2020-04-09

## (undated) RX ORDER — DOPAMINE HYDROCHLORIDE 160 MG/100ML
INJECTION, SOLUTION INTRAVENOUS
Status: DISPENSED
Start: 2020-03-05

## (undated) RX ORDER — HEPARIN SODIUM 200 [USP'U]/100ML
INJECTION, SOLUTION INTRAVENOUS
Status: DISPENSED
Start: 2020-03-05

## (undated) RX ORDER — FENTANYL CITRATE 50 UG/ML
INJECTION, SOLUTION INTRAMUSCULAR; INTRAVENOUS
Status: DISPENSED
Start: 2020-03-05